# Patient Record
Sex: FEMALE | Race: WHITE | NOT HISPANIC OR LATINO | Employment: OTHER | URBAN - METROPOLITAN AREA
[De-identification: names, ages, dates, MRNs, and addresses within clinical notes are randomized per-mention and may not be internally consistent; named-entity substitution may affect disease eponyms.]

---

## 2023-03-16 ENCOUNTER — TELEPHONE (OUTPATIENT)
Dept: PRIMARY CARE | Facility: CLINIC | Age: 88
End: 2023-03-16

## 2023-03-16 DIAGNOSIS — I10 PRIMARY HYPERTENSION: Primary | ICD-10-CM

## 2023-03-16 RX ORDER — AMLODIPINE BESYLATE 5 MG/1
5 TABLET ORAL DAILY
Qty: 90 TABLET | Refills: 0 | Status: SHIPPED | OUTPATIENT
Start: 2023-03-16 | End: 2023-05-04

## 2023-03-16 RX ORDER — AMLODIPINE BESYLATE 5 MG/1
TABLET ORAL
COMMUNITY
End: 2023-03-16 | Stop reason: SDUPTHER

## 2023-03-27 PROBLEM — E78.00 HYPERCHOLESTEROLEMIA: Status: ACTIVE | Noted: 2023-03-27

## 2023-03-27 PROBLEM — G45.9 TIA (TRANSIENT ISCHEMIC ATTACK): Status: ACTIVE | Noted: 2023-03-27

## 2023-03-27 PROBLEM — I35.1 NONRHEUMATIC AORTIC VALVE INSUFFICIENCY: Status: ACTIVE | Noted: 2023-03-27

## 2023-03-27 PROBLEM — M85.80 OSTEOPENIA: Status: ACTIVE | Noted: 2023-03-27

## 2023-03-27 PROBLEM — I10 HYPERTENSION: Status: ACTIVE | Noted: 2023-03-27

## 2023-03-27 PROBLEM — R32 URINARY INCONTINENCE: Status: ACTIVE | Noted: 2023-03-27

## 2023-03-27 PROBLEM — I51.89 LEFT VENTRICULAR DIASTOLIC DYSFUNCTION, NYHA CLASS 1: Status: ACTIVE | Noted: 2023-03-27

## 2023-03-27 PROBLEM — K21.9 ESOPHAGEAL REFLUX: Status: ACTIVE | Noted: 2023-03-27

## 2023-03-27 PROBLEM — J30.9 ALLERGIC RHINITIS: Status: ACTIVE | Noted: 2023-03-27

## 2023-03-27 PROBLEM — F41.9 ANXIETY: Status: ACTIVE | Noted: 2023-03-27

## 2023-03-27 PROBLEM — I20.89 ANGINA AT REST (CMS-HCC): Status: ACTIVE | Noted: 2023-03-27

## 2023-03-27 PROBLEM — Z96.0 VAGINAL PESSARY PRESENT: Status: ACTIVE | Noted: 2023-03-27

## 2023-03-27 PROBLEM — M19.90 ARTHRITIS: Status: ACTIVE | Noted: 2023-03-27

## 2023-03-27 PROBLEM — I47.19 ATRIAL TACHYCARDIA (CMS-HCC): Status: ACTIVE | Noted: 2023-03-27

## 2023-03-27 PROBLEM — E55.9 VITAMIN D DEFICIENCY: Status: ACTIVE | Noted: 2023-03-27

## 2023-03-27 PROBLEM — R01.1 CARDIAC MURMUR: Status: ACTIVE | Noted: 2023-03-27

## 2023-03-27 RX ORDER — FUROSEMIDE 20 MG/1
0.5 TABLET ORAL DAILY
COMMUNITY
Start: 2021-06-07 | End: 2023-03-28 | Stop reason: SDUPTHER

## 2023-03-27 RX ORDER — KETOCONAZOLE 20 MG/ML
SHAMPOO, SUSPENSION TOPICAL
COMMUNITY
Start: 2022-09-08

## 2023-03-27 RX ORDER — CALCIUM CARBONATE 600 MG
1 TABLET ORAL 3 TIMES DAILY
COMMUNITY

## 2023-03-27 RX ORDER — BENAZEPRIL HYDROCHLORIDE 40 MG/1
1 TABLET ORAL 2 TIMES DAILY
COMMUNITY
End: 2023-07-20

## 2023-03-27 RX ORDER — FAMOTIDINE 20 MG/1
1 TABLET, FILM COATED ORAL 2 TIMES DAILY
COMMUNITY

## 2023-03-27 RX ORDER — PSYLLIUM SEED (WITH DEXTROSE)
POWDER (GRAM) ORAL
COMMUNITY
End: 2023-05-18 | Stop reason: SDUPTHER

## 2023-03-27 RX ORDER — OXYQUINOLINE SULFATE AND SODIUM LAURYL SULFATE .25; .1 MG/G; MG/G
JELLY VAGINAL
COMMUNITY
Start: 2021-10-12 | End: 2023-05-18 | Stop reason: SDUPTHER

## 2023-03-27 RX ORDER — NEBIVOLOL 10 MG/1
1 TABLET ORAL DAILY
COMMUNITY
Start: 2021-04-12 | End: 2024-02-13 | Stop reason: SDUPTHER

## 2023-03-27 RX ORDER — FLUTICASONE PROPIONATE 50 MCG
1 SPRAY, SUSPENSION (ML) NASAL DAILY
COMMUNITY
Start: 2021-02-11 | End: 2023-07-17 | Stop reason: SDUPTHER

## 2023-03-27 RX ORDER — CLOBETASOL PROPIONATE 0.5 MG/G
CREAM TOPICAL
COMMUNITY

## 2023-03-27 RX ORDER — ATORVASTATIN CALCIUM 10 MG/1
0.5 TABLET, FILM COATED ORAL DAILY
COMMUNITY
Start: 2012-10-09 | End: 2023-10-17 | Stop reason: SDUPTHER

## 2023-03-27 RX ORDER — POTASSIUM CHLORIDE 20 MEQ/1
TABLET, EXTENDED RELEASE ORAL
COMMUNITY
Start: 2021-04-02 | End: 2023-12-01

## 2023-03-27 RX ORDER — VIT C/E/ZN/COPPR/LUTEIN/ZEAXAN 250MG-90MG
CAPSULE ORAL
COMMUNITY

## 2023-03-27 RX ORDER — ALPRAZOLAM 0.25 MG/1
0.25 TABLET ORAL DAILY PRN
COMMUNITY
Start: 2020-05-13 | End: 2023-05-02 | Stop reason: SDUPTHER

## 2023-03-27 RX ORDER — MOMETASONE FUROATE 1 MG/G
CREAM TOPICAL
COMMUNITY
Start: 2022-02-02

## 2023-03-27 RX ORDER — NITROGLYCERIN 0.4 MG/1
0.4 TABLET SUBLINGUAL EVERY 5 MIN PRN
COMMUNITY
Start: 2017-02-11

## 2023-03-27 RX ORDER — ACETAMINOPHEN 325 MG/1
TABLET ORAL
COMMUNITY

## 2023-03-28 ENCOUNTER — OFFICE VISIT (OUTPATIENT)
Dept: PRIMARY CARE | Facility: CLINIC | Age: 88
End: 2023-03-28
Payer: MEDICARE

## 2023-03-28 VITALS
BODY MASS INDEX: 22.41 KG/M2 | OXYGEN SATURATION: 96 % | WEIGHT: 118.7 LBS | HEIGHT: 61 IN | DIASTOLIC BLOOD PRESSURE: 58 MMHG | SYSTOLIC BLOOD PRESSURE: 126 MMHG | HEART RATE: 76 BPM

## 2023-03-28 DIAGNOSIS — J30.2 SEASONAL ALLERGIC RHINITIS, UNSPECIFIED TRIGGER: Primary | ICD-10-CM

## 2023-03-28 DIAGNOSIS — I35.1 NONRHEUMATIC AORTIC VALVE INSUFFICIENCY: ICD-10-CM

## 2023-03-28 DIAGNOSIS — K59.09 CONSTIPATION, CHRONIC: ICD-10-CM

## 2023-03-28 DIAGNOSIS — I10 PRIMARY HYPERTENSION: ICD-10-CM

## 2023-03-28 DIAGNOSIS — J30.9 ALLERGIC RHINITIS, UNSPECIFIED SEASONALITY, UNSPECIFIED TRIGGER: ICD-10-CM

## 2023-03-28 DIAGNOSIS — I10 ESSENTIAL HYPERTENSION: ICD-10-CM

## 2023-03-28 PROBLEM — R07.9 CHEST PAIN: Status: ACTIVE | Noted: 2017-02-10

## 2023-03-28 PROBLEM — J31.0 NASAL INFLAMMATION: Status: ACTIVE | Noted: 2023-03-28

## 2023-03-28 PROCEDURE — 3078F DIAST BP <80 MM HG: CPT | Performed by: INTERNAL MEDICINE

## 2023-03-28 PROCEDURE — 99214 OFFICE O/P EST MOD 30 MIN: CPT | Performed by: INTERNAL MEDICINE

## 2023-03-28 PROCEDURE — 1160F RVW MEDS BY RX/DR IN RCRD: CPT | Performed by: INTERNAL MEDICINE

## 2023-03-28 PROCEDURE — 3074F SYST BP LT 130 MM HG: CPT | Performed by: INTERNAL MEDICINE

## 2023-03-28 PROCEDURE — 1159F MED LIST DOCD IN RCRD: CPT | Performed by: INTERNAL MEDICINE

## 2023-03-28 PROCEDURE — 1036F TOBACCO NON-USER: CPT | Performed by: INTERNAL MEDICINE

## 2023-03-28 RX ORDER — FUROSEMIDE 20 MG/1
10 TABLET ORAL DAILY
Qty: 90 TABLET | Refills: 3 | Status: SHIPPED | OUTPATIENT
Start: 2023-03-28 | End: 2024-04-17

## 2023-03-28 RX ORDER — LORATADINE 10 MG/1
10 TABLET ORAL DAILY
Qty: 30 TABLET | Refills: 2 | Status: SHIPPED | OUTPATIENT
Start: 2023-03-28 | End: 2024-02-16 | Stop reason: WASHOUT

## 2023-03-28 ASSESSMENT — ENCOUNTER SYMPTOMS
CONSTIPATION: 1
DIZZINESS: 0
VOMITING: 0
FATIGUE: 0
DYSURIA: 0
SORE THROAT: 0
HEADACHES: 0
CHILLS: 0
WEAKNESS: 0
DIARRHEA: 0
BACK PAIN: 1
FREQUENCY: 0
FEVER: 0
COUGH: 0
EYE DISCHARGE: 1
RHINORRHEA: 1
EYE ITCHING: 1
PALPITATIONS: 0
DIFFICULTY URINATING: 0
SHORTNESS OF BREATH: 0
HEMATURIA: 0
LIGHT-HEADEDNESS: 0
MYALGIAS: 0
ARTHRALGIAS: 0
NAUSEA: 0

## 2023-03-28 ASSESSMENT — PATIENT HEALTH QUESTIONNAIRE - PHQ9
SUM OF ALL RESPONSES TO PHQ9 QUESTIONS 1 AND 2: 0
1. LITTLE INTEREST OR PLEASURE IN DOING THINGS: NOT AT ALL
2. FEELING DOWN, DEPRESSED OR HOPELESS: NOT AT ALL

## 2023-03-28 ASSESSMENT — PAIN SCALES - GENERAL: PAINLEVEL: 6

## 2023-03-28 NOTE — ASSESSMENT & PLAN NOTE
She has watery eyes runny nose cough and postnasal drip.  She says the Flonase makes her feel little hyper anxious so I recommended trying Claritin 10 mg daily for at least 2 weeks to see if it helps her symptoms.  If symptoms or not improving we would possibly consider an referral to an allergist.

## 2023-03-28 NOTE — ASSESSMENT & PLAN NOTE
Patient was instructed to take 1 dose of Metamucil every day and if she gets constipated does not have a bowel movement then she can take a half a dose of MiraLAX.  While taking a full dose of MiraLAX she ended up having diarrhea so I think it is too much for her.  I explained that she could take both medications together however

## 2023-03-28 NOTE — PATIENT INSTRUCTIONS
Use flonase 2 puffs each nostril once a day    Try clariten or loratedine 10 mg a day OTC    Take metamucil daily and miralax as need for constipation    Keep June appt

## 2023-03-28 NOTE — PROGRESS NOTES
Subjective   Patient ID: Gris Salcido is a 89 y.o. female who presents for allergies and med refill.  BN    Patient made the appointment today to discuss several issues including her eyes and runny runny eyes and runny nose.  She is also started coughing recently and thinks she may have allergies.  She also complains of worsening constipation she tried taking MiraLAX daily but it being caused her to have diarrhea or runny stools if she takes it on a daily basis and she was not sure if she should be taking it along with Metamucil or instead of.          Review of Systems   Constitutional:  Negative for chills, fatigue and fever.   HENT:  Positive for congestion, hearing loss, postnasal drip and rhinorrhea. Negative for sore throat.    Eyes:  Positive for discharge and itching. Negative for visual disturbance.   Respiratory:  Negative for cough and shortness of breath.    Cardiovascular:  Negative for chest pain, palpitations and leg swelling.   Gastrointestinal:  Positive for constipation. Negative for diarrhea, nausea and vomiting.   Genitourinary:  Negative for difficulty urinating, dysuria, frequency, hematuria and urgency.   Musculoskeletal:  Positive for back pain. Negative for arthralgias and myalgias.   Skin:  Negative for rash.   Neurological:  Negative for dizziness, syncope, weakness, light-headedness and headaches.       Objective   Physical Exam  Constitutional:       Appearance: Normal appearance.   HENT:      Head: Normocephalic and atraumatic.      Nose: Rhinorrhea present.   Eyes:      General:         Right eye: Discharge present.         Left eye: Discharge present.     Extraocular Movements: Extraocular movements intact.      Pupils: Pupils are equal, round, and reactive to light.   Cardiovascular:      Rate and Rhythm: Normal rate and regular rhythm.      Heart sounds: Murmur heard.   Pulmonary:      Breath sounds: Normal breath sounds.   Abdominal:      General: Abdomen is flat. Bowel sounds are  "normal.      Palpations: Abdomen is soft.   Musculoskeletal:      Right lower leg: No edema.      Left lower leg: No edema.   Neurological:      Mental Status: She is alert.     /58   Pulse 76   Ht 1.549 m (5' 1\")   Wt 53.8 kg (118 lb 11.2 oz)   SpO2 96%   BMI 22.43 kg/m²       Assessment/Plan   Problem List Items Addressed This Visit          Circulatory    Hypertension     Stable         Aortic regurgitation     Patient still has a mild 1 out of 6 to 2 out of 6 diastolic murmur            Digestive    Constipation, chronic     Patient was instructed to take 1 dose of Metamucil every day and if she gets constipated does not have a bowel movement then she can take a half a dose of MiraLAX.  While taking a full dose of MiraLAX she ended up having diarrhea so I think it is too much for her.  I explained that she could take both medications together however            Infectious/Inflammatory    Nasal inflammation - Primary    Relevant Medications    loratadine (Claritin) 10 mg tablet       Other    Allergic rhinitis     She has watery eyes runny nose cough and postnasal drip.  She says the Flonase makes her feel little hyper anxious so I recommended trying Claritin 10 mg daily for at least 2 weeks to see if it helps her symptoms.  If symptoms or not improving we would possibly consider an referral to an allergist.          Other Visit Diagnoses       Essential hypertension        Relevant Medications    furosemide (Lasix) 20 mg tablet                   It has been a pleasure seeing you.  "

## 2023-04-06 ENCOUNTER — TELEPHONE (OUTPATIENT)
Dept: PRIMARY CARE | Facility: CLINIC | Age: 88
End: 2023-04-06
Payer: MEDICARE

## 2023-04-06 NOTE — TELEPHONE ENCOUNTER
Patient cut back her amlodipine and doesn't now if it is a coincident but she started to have paplitations.   Patient wants to know if she can take 1/2 a pill of amlodipine in the morning and 1 at night of amlodipine.   Please evaluate and advise.

## 2023-04-06 NOTE — TELEPHONE ENCOUNTER
Patient states she was having palpitations last night for 1 1/2 hours and she also states she is not having it now. Patient want to know if she can go back to taking her amlodipine dose of 1/1 a pill in the morning and 1 pill at night instead of taking 1 pill a day.  BN

## 2023-05-01 ENCOUNTER — TELEPHONE (OUTPATIENT)
Dept: PRIMARY CARE | Facility: CLINIC | Age: 88
End: 2023-05-01
Payer: MEDICARE

## 2023-05-01 DIAGNOSIS — F41.9 ANXIETY: ICD-10-CM

## 2023-05-01 RX ORDER — ALPRAZOLAM 0.25 MG/1
0.25 TABLET ORAL DAILY PRN
Qty: 30 TABLET | Refills: 0 | Status: CANCELLED | OUTPATIENT
Start: 2023-05-01

## 2023-05-02 DIAGNOSIS — F41.1 ANXIETY STATE: Primary | ICD-10-CM

## 2023-05-02 RX ORDER — ALPRAZOLAM 0.25 MG/1
0.25 TABLET ORAL DAILY PRN
Qty: 30 TABLET | Refills: 0 | Status: SHIPPED | OUTPATIENT
Start: 2023-05-02 | End: 2023-10-17 | Stop reason: SDUPTHER

## 2023-05-02 NOTE — TELEPHONE ENCOUNTER
Patient rarely uses this medication and last prescription was sent November 2021.  Due to the scarcity of the usage a controlled substance agreement is not necessary and number 30 pills was sent with no refills.

## 2023-05-15 ENCOUNTER — TELEPHONE (OUTPATIENT)
Dept: PRIMARY CARE | Facility: CLINIC | Age: 88
End: 2023-05-15

## 2023-05-18 PROBLEM — E78.00 HYPERCHOLESTEROLEMIA: Status: ACTIVE | Noted: 2023-05-18

## 2023-05-18 PROBLEM — R00.2 PALPITATIONS: Status: ACTIVE | Noted: 2023-05-18

## 2023-05-18 PROBLEM — R32 URINARY INCONTINENCE: Status: ACTIVE | Noted: 2023-05-18

## 2023-05-18 RX ORDER — OXYQUINOLINE SULFATE AND SODIUM LAURYL SULFATE .25; .1 MG/G; MG/G
JELLY VAGINAL
COMMUNITY
Start: 2021-10-12

## 2023-05-18 RX ORDER — BACITRACIN ZINC AND POLYMYXIN B SULFATE 500; 10000 [USP'U]/G; [USP'U]/G
1 OINTMENT OPHTHALMIC 4 TIMES DAILY
COMMUNITY
Start: 2023-05-13

## 2023-05-18 RX ORDER — PSYLLIUM SEED (WITH DEXTROSE)
POWDER (GRAM) ORAL
COMMUNITY

## 2023-05-19 ENCOUNTER — OFFICE VISIT (OUTPATIENT)
Dept: PRIMARY CARE | Facility: CLINIC | Age: 88
End: 2023-05-19
Payer: MEDICARE

## 2023-05-19 VITALS
WEIGHT: 117.8 LBS | HEART RATE: 67 BPM | HEIGHT: 61 IN | SYSTOLIC BLOOD PRESSURE: 152 MMHG | BODY MASS INDEX: 22.24 KG/M2 | DIASTOLIC BLOOD PRESSURE: 59 MMHG | OXYGEN SATURATION: 93 %
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
## 2023-05-19 DIAGNOSIS — S00.83XD FACIAL CONTUSION, SUBSEQUENT ENCOUNTER: ICD-10-CM

## 2023-05-19 DIAGNOSIS — S01.81XD FACE LACERATIONS, SUBSEQUENT ENCOUNTER: Primary | ICD-10-CM

## 2023-05-19 DIAGNOSIS — Z48.02 VISIT FOR SUTURE REMOVAL: ICD-10-CM

## 2023-05-19 PROCEDURE — 99214 OFFICE O/P EST MOD 30 MIN: CPT | Performed by: INTERNAL MEDICINE

## 2023-05-19 PROCEDURE — 1160F RVW MEDS BY RX/DR IN RCRD: CPT | Performed by: INTERNAL MEDICINE

## 2023-05-19 PROCEDURE — 1159F MED LIST DOCD IN RCRD: CPT | Performed by: INTERNAL MEDICINE

## 2023-05-19 PROCEDURE — 3077F SYST BP >= 140 MM HG: CPT | Performed by: INTERNAL MEDICINE

## 2023-05-19 PROCEDURE — 3078F DIAST BP <80 MM HG: CPT | Performed by: INTERNAL MEDICINE

## 2023-05-19 PROCEDURE — 1036F TOBACCO NON-USER: CPT | Performed by: INTERNAL MEDICINE

## 2023-05-19 ASSESSMENT — ENCOUNTER SYMPTOMS
HEADACHES: 0
FREQUENCY: 0
FEVER: 0
HEMATURIA: 0
LIGHT-HEADEDNESS: 0
VOMITING: 0
COUGH: 0
FATIGUE: 0
SHORTNESS OF BREATH: 0
PALPITATIONS: 0
ARTHRALGIAS: 0
RHINORRHEA: 0
DIZZINESS: 0
WEAKNESS: 0
MYALGIAS: 0
CONSTIPATION: 0
NAUSEA: 0
DYSURIA: 0
DIFFICULTY URINATING: 0
DIARRHEA: 0
FACIAL SWELLING: 1
SORE THROAT: 0
CHILLS: 0

## 2023-05-19 ASSESSMENT — PATIENT HEALTH QUESTIONNAIRE - PHQ9: 1. LITTLE INTEREST OR PLEASURE IN DOING THINGS: NOT AT ALL

## 2023-05-19 ASSESSMENT — PAIN SCALES - GENERAL: PAINLEVEL: 4

## 2023-05-19 NOTE — PROGRESS NOTES
Subjective   Patient ID: Gris Salcido is a 89 y.o. female who presents for removal of stitches of the left eyebrow.  BN    Patient is here for suture removal after a fall.  On last Friday which was May 12 the patient was in her bathroom tripped over a small rug and fell into the opening of the shower where the transmit of the shower is.  She did hit her nose and face got a laceration over her left eyebrow and broke her nose.  She says extensive bruising.  She was taken to the emergency room where they diagnosed her with a nasal fracture and she does have an upcoming appointment with an ENT to have this evaluated and determine if anything needs to be done.    Patient had sutures subcutaneous dissolving under the left cheek as well as over the left eyebrow and 5 black sutures in the left eyebrow on the surface as well.  Because of the extent of the damage the patient was placed on Bactrim which she has completed a whole week but is complaining of significant nausea on the medication.  Since she has no evidence of infection and it is healing nicely I told her she can stop the Bactrim.     Patient does remember the fall clearly and did not have a syncopal episode.        Review of Systems   Constitutional:  Negative for chills, fatigue and fever.   HENT:  Positive for facial swelling. Negative for dental problem, drooling, ear pain, hearing loss, mouth sores, nosebleeds, postnasal drip, rhinorrhea and sore throat.         Left eye is draining which she is using antibiotic drops.   Eyes:  Negative for visual disturbance.   Respiratory:  Negative for cough and shortness of breath.    Cardiovascular:  Negative for chest pain, palpitations and leg swelling.   Gastrointestinal:  Negative for constipation, diarrhea, nausea and vomiting.   Genitourinary:  Negative for difficulty urinating, dysuria, frequency, hematuria and urgency.   Musculoskeletal:  Negative for arthralgias and myalgias.   Skin:  Negative for rash.    Neurological:  Negative for dizziness, syncope, weakness, light-headedness and headaches.       Objective   Medication Documentation Review Audit       Reviewed by Noa Coelho MD (Physician) on 05/19/23 at 0908      Medication Order Taking? Sig Documenting Provider Last Dose Status   acetaminophen (TylenoL) 325 mg tablet [id number]  Tylenol 325 MG Oral Tablet   Refills: 0       Active Estela Mejia MD  Active   ALPRAZolam (Xanax) 0.25 mg tablet [id number]  Take 1 tablet (0.25 mg) by mouth once daily as needed for anxiety (once daily as needed for anxiety). Noa Coelho MD  Active   amLODIPine (Norvasc) 5 mg tablet [id number]  TAKE 1 TABLET BY MOUTH ONCE  DAILY Noa Coelho MD  Active   atorvastatin (Lipitor) 10 mg tablet [id number] No Take 0.5 tablets (5 mg) by mouth once daily. Estela Mejia MD Taking Active   bacitracin-polymyxin B (Polysporin) ophthalmic ointment [id number]  1 Application 4 times a day. Estela Mejia MD  Active   benazepril (Lotensin) 40 mg tablet [id number] No Take 1 tablet (40 mg) by mouth in the morning and 1 tablet (40 mg) before bedtime. Estela Mejia MD Taking Active   calcium carbonate 600 mg calcium (1,500 mg) tablet [id number] No Take 1 tablet (600 mg) by mouth in the morning and 1 tablet (600 mg) in the evening and 1 tablet (600 mg) before bedtime. Estela Mejia MD Taking Active   cholecalciferol (Vitamin D-3) 25 MCG (1000 UT) capsule [id number]  TAKE 1 CAPSULE M, W,Fri, Sat Estela Mejia MD  Active   clobetasol (Temovate) 0.05 % cream [id number]  Apply cream topically to affected areas on the trunk & extremities twice daily until clear, then as needed for flares cover with thick moisturizer, avoid groin, face Estela Mejia MD  Active   famotidine (Pepcid) 20 mg tablet [id number] No Take 1 tablet (20 mg) by mouth in the morning and 1 tablet (20 mg) before bedtime. Estela Mejia MD Taking Active   fluticasone (Flonase) 50 mcg/actuation  nasal spray [id number]  Administer 1 spray into each nostril once daily. Historical Provider, MD  Active   furosemide (Lasix) 20 mg tablet [id number]  Take 0.5 tablets (10 mg) by mouth once daily. Noa Coelho MD  Active   ketoconazole (NIZOral) 2 % shampoo [id number]  WASH SCALP DAILY UNTIL CLEAR. THEN USE 2-3 TIMES A WEEK FOR MAINTENANCE, LET SIT FOR 3-5 MINUTES THEN RINSE Estela Mejia MD  Active   loratadine (Claritin) 10 mg tablet [id number]  Take 1 tablet (10 mg) by mouth once daily. Noa Coelho MD  Active   mometasone (Elocon) 0.1 % cream [id number]  APPLY CREAM TOPICALLY TWICE DAILY TO AFFECTED AREA ON LEG UNTIL RESOLVED, TAPER AS DIRECTED Estela Mejia MD  Active   nebivolol (Bystolic) 10 mg tablet [id number] No Take 1 tablet (10 mg) by mouth once daily. Estela Mejia MD Taking Active   nitroglycerin (Nitrostat) 0.4 mg SL tablet [id number]  Place 1 tablet (0.4 mg) under the tongue every 5 minutes if needed for chest pain. For up to 3 doses. Call 911 if pain persists. Historical Provider, MD  Active     Discontinued 05/18/23 1746   oxyquinoline-sod.lauryl sulfat (Trimo-Stovall Jelly) 0.025-0.01 % gel [id number]  Insert into the vagina. Historical Provider, MD  Active   potassium chloride CR (Klor-Con M20) 20 mEq ER tablet [id number]  Take a tab Monday-Wednesday and Friday Historical MD Jackie  Active   psyllium (Metamucil) powder [id number]  Take by mouth once daily. Historical Provider, MD  Active     Discontinued 05/18/23 1745   psyllium husk, with sugar, (Metamucil, with sugar,) 3.4 gram/12 gram powder [id number]  Take by mouth. Historical Provider, MD  Active                  Physical Exam  Constitutional:       Appearance: Normal appearance.   HENT:      Head: Normocephalic.      Comments: Patient has swelling over the left cheek and a palpable hematoma the size of half of an egg in her left cheek.  The entire left side of her face is shades of yellow purple-red and blue extending down her neck to  "the clavicle on the left side.  She also has some more restricted purple bruising on the right side of her cheek but does not stops at about her lip level.  Left eyebrow has a proximately a 2 inch incision that starts near the nasal bridge and goes up and laterally and then across the top of her eyebrow on the left side.  There are 5 black sutures in place     Nose: Nose normal.   Eyes:      Extraocular Movements: Extraocular movements intact.      Pupils: Pupils are equal, round, and reactive to light.   Cardiovascular:      Rate and Rhythm: Normal rate and regular rhythm.   Pulmonary:      Breath sounds: Normal breath sounds.   Abdominal:      General: Abdomen is flat. Bowel sounds are normal.      Palpations: Abdomen is soft.   Musculoskeletal:      Right lower leg: No edema.      Left lower leg: No edema.   Neurological:      Mental Status: She is alert.     /59 (BP Location: Left arm, Patient Position: Sitting)   Pulse 67   Ht 1.549 m (5' 1\")   Wt 53.4 kg (117 lb 12.8 oz)   SpO2 93%   BMI 22.26 kg/m²           Assessment/Plan   Problem List Items Addressed This Visit          Other    Face lacerations, subsequent encounter - Primary    Visit for suture removal     Patient was placed in the supine position.  Alcohol swab was used to clean the laceration over the left eyebrow.  Using suture remover scissors and tweezers the 5 sutures were easily removed.  Benzoin and 2 Steri-Strips were applied to the part of the laceration most medial or closest to the nose as it appeared slightly open but was not gapping.  There is no erythema or drainage around any of the wounds and no evidence of infection.         Facial contusion, subsequent encounter    Fall due to tripping on loose carpet     Patient was reminded to follow-up with ENT for her facial or nose fracture  She will keep her appointment with me in June to follow-up on this as well as other medical problems.         It has been a pleasure seeing " you.

## 2023-05-19 NOTE — ASSESSMENT & PLAN NOTE
Patient was placed in the supine position.  Alcohol swab was used to clean the laceration over the left eyebrow.  Using suture remover scissors and tweezers the 5 sutures were easily removed.  Benzoin and 2 Steri-Strips were applied to the part of the laceration most medial or closest to the nose as it appeared slightly open but was not gapping.  There is no erythema or drainage around any of the wounds and no evidence of infection.

## 2023-05-22 ENCOUNTER — APPOINTMENT (OUTPATIENT)
Dept: PRIMARY CARE | Facility: CLINIC | Age: 88
End: 2023-05-22
Payer: MEDICARE

## 2023-06-01 LAB
ALANINE AMINOTRANSFERASE (SGPT) (U/L) IN SER/PLAS: 9 U/L (ref 7–45)
ALBUMIN (G/DL) IN SER/PLAS: 4.3 G/DL (ref 3.4–5)
ALKALINE PHOSPHATASE (U/L) IN SER/PLAS: 70 U/L (ref 33–136)
ANION GAP IN SER/PLAS: 13 MMOL/L (ref 10–20)
ASPARTATE AMINOTRANSFERASE (SGOT) (U/L) IN SER/PLAS: 18 U/L (ref 9–39)
BILIRUBIN TOTAL (MG/DL) IN SER/PLAS: 0.7 MG/DL (ref 0–1.2)
CALCIDIOL (25 OH VITAMIN D3) (NG/ML) IN SER/PLAS: 48 NG/ML
CALCIUM (MG/DL) IN SER/PLAS: 10 MG/DL (ref 8.6–10.6)
CARBON DIOXIDE, TOTAL (MMOL/L) IN SER/PLAS: 28 MMOL/L (ref 21–32)
CHLORIDE (MMOL/L) IN SER/PLAS: 99 MMOL/L (ref 98–107)
CHOLESTEROL (MG/DL) IN SER/PLAS: 131 MG/DL (ref 0–199)
CHOLESTEROL IN HDL (MG/DL) IN SER/PLAS: 56.1 MG/DL
CHOLESTEROL/HDL RATIO: 2.3
CREATININE (MG/DL) IN SER/PLAS: 0.89 MG/DL (ref 0.5–1.05)
ERYTHROCYTE DISTRIBUTION WIDTH (RATIO) BY AUTOMATED COUNT: 14.1 % (ref 11.5–14.5)
ERYTHROCYTE MEAN CORPUSCULAR HEMOGLOBIN CONCENTRATION (G/DL) BY AUTOMATED: 31.9 G/DL (ref 32–36)
ERYTHROCYTE MEAN CORPUSCULAR VOLUME (FL) BY AUTOMATED COUNT: 97 FL (ref 80–100)
ERYTHROCYTES (10*6/UL) IN BLOOD BY AUTOMATED COUNT: 3.65 X10E12/L (ref 4–5.2)
GFR FEMALE: 62 ML/MIN/1.73M2
GLUCOSE (MG/DL) IN SER/PLAS: 78 MG/DL (ref 74–99)
HEMATOCRIT (%) IN BLOOD BY AUTOMATED COUNT: 35.4 % (ref 36–46)
HEMOGLOBIN (G/DL) IN BLOOD: 11.3 G/DL (ref 12–16)
LDL: 61 MG/DL (ref 0–99)
LEUKOCYTES (10*3/UL) IN BLOOD BY AUTOMATED COUNT: 6.6 X10E9/L (ref 4.4–11.3)
NRBC (PER 100 WBCS) BY AUTOMATED COUNT: 0 /100 WBC (ref 0–0)
PLATELETS (10*3/UL) IN BLOOD AUTOMATED COUNT: 221 X10E9/L (ref 150–450)
POTASSIUM (MMOL/L) IN SER/PLAS: 4.1 MMOL/L (ref 3.5–5.3)
PROTEIN TOTAL: 7 G/DL (ref 6.4–8.2)
SODIUM (MMOL/L) IN SER/PLAS: 136 MMOL/L (ref 136–145)
THYROTROPIN (MIU/L) IN SER/PLAS BY DETECTION LIMIT <= 0.05 MIU/L: 3.47 MIU/L (ref 0.44–3.98)
TRIGLYCERIDE (MG/DL) IN SER/PLAS: 69 MG/DL (ref 0–149)
UREA NITROGEN (MG/DL) IN SER/PLAS: 14 MG/DL (ref 6–23)
VLDL: 14 MG/DL (ref 0–40)

## 2023-06-07 ENCOUNTER — OFFICE VISIT (OUTPATIENT)
Dept: PRIMARY CARE | Facility: CLINIC | Age: 88
End: 2023-06-07
Payer: MEDICARE

## 2023-06-07 VITALS — DIASTOLIC BLOOD PRESSURE: 60 MMHG | SYSTOLIC BLOOD PRESSURE: 130 MMHG

## 2023-06-07 DIAGNOSIS — I10 PRIMARY HYPERTENSION: ICD-10-CM

## 2023-06-07 DIAGNOSIS — D64.9 ANEMIA, UNSPECIFIED TYPE: Primary | ICD-10-CM

## 2023-06-07 DIAGNOSIS — I47.19 ATRIAL TACHYCARDIA (CMS-HCC): ICD-10-CM

## 2023-06-07 DIAGNOSIS — S00.83XD FACIAL CONTUSION, SUBSEQUENT ENCOUNTER: ICD-10-CM

## 2023-06-07 DIAGNOSIS — S01.81XD FACE LACERATIONS, SUBSEQUENT ENCOUNTER: ICD-10-CM
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
## 2023-06-07 PROBLEM — Z48.02 VISIT FOR SUTURE REMOVAL: Status: RESOLVED | Noted: 2023-05-19 | Resolved: 2023-06-07

## 2023-06-07 PROBLEM — R07.9 CHEST PAIN: Status: RESOLVED | Noted: 2017-02-10 | Resolved: 2023-06-07

## 2023-06-07 PROCEDURE — 1159F MED LIST DOCD IN RCRD: CPT | Performed by: INTERNAL MEDICINE

## 2023-06-07 PROCEDURE — 3078F DIAST BP <80 MM HG: CPT | Performed by: INTERNAL MEDICINE

## 2023-06-07 PROCEDURE — 99214 OFFICE O/P EST MOD 30 MIN: CPT | Performed by: INTERNAL MEDICINE

## 2023-06-07 PROCEDURE — 1160F RVW MEDS BY RX/DR IN RCRD: CPT | Performed by: INTERNAL MEDICINE

## 2023-06-07 PROCEDURE — 3075F SYST BP GE 130 - 139MM HG: CPT | Performed by: INTERNAL MEDICINE

## 2023-06-07 PROCEDURE — 1036F TOBACCO NON-USER: CPT | Performed by: INTERNAL MEDICINE

## 2023-06-07 ASSESSMENT — ENCOUNTER SYMPTOMS
DIZZINESS: 0
DIARRHEA: 0
SHORTNESS OF BREATH: 0
MYALGIAS: 0
PALPITATIONS: 0
HEADACHES: 0
LIGHT-HEADEDNESS: 0
COUGH: 0
NAUSEA: 0
HEMATURIA: 0
CONSTIPATION: 0
ARTHRALGIAS: 0
FEVER: 0
DIFFICULTY URINATING: 0
FREQUENCY: 0
DYSURIA: 0
SORE THROAT: 0
WEAKNESS: 0
VOMITING: 0
FATIGUE: 0
CHILLS: 0

## 2023-06-07 NOTE — PATIENT INSTRUCTIONS
Please get a CBC or blood work at the end of the month    Follow up Dr Coelho in 4 month  30 min appointment

## 2023-06-07 NOTE — PROGRESS NOTES
Subjective   Patient ID: Gris Salcido is a 89 y.o. female who presents for No chief complaint on file..  Patient is here for follow-up for her hypertension cholesterol and medication management.  Complete blood work was finished June 1 and was reviewed with the patient today.  The patient is accompanied by her niece today Makenna.  Patient still has bruising under her right eye and cheek from a fall she had recently.    The most interesting data in her complete blood work is that she is anemic.  I suspect this anemia is from her recent fall when she had a bloody nose blood loss and extensive bruising in her face and head.  Rather than do a full evaluation and work-up I will repeat her CBC at the end of this month to make sure it is stable or rising as we already have a probable explanation for her anemia.        Review of Systems   Constitutional:  Negative for chills, fatigue and fever.   HENT:  Negative for sore throat.    Eyes:  Negative for visual disturbance.   Respiratory:  Negative for cough and shortness of breath.    Cardiovascular:  Negative for chest pain, palpitations and leg swelling.   Gastrointestinal:  Negative for constipation, diarrhea, nausea and vomiting.   Genitourinary:  Negative for difficulty urinating, dysuria, frequency, hematuria and urgency.   Musculoskeletal:  Positive for gait problem. Negative for arthralgias and myalgias.   Skin:  Negative for rash.   Neurological:  Negative for dizziness, syncope, weakness, light-headedness and headaches.       Objective   Medication Documentation Review Audit       Reviewed by Noa Coelho MD (Physician) on 06/07/23 at 1336      Medication Order Taking? Sig Documenting Provider Last Dose Status   acetaminophen (TylenoL) 325 mg tablet [id number]  Tylenol 325 MG Oral Tablet   Refills: 0       Active Historical Provider, MD  Active   ALPRAZolam (Xanax) 0.25 mg tablet [id number]  Take 1 tablet (0.25 mg) by mouth once daily as needed for anxiety (once daily  as needed for anxiety). Noa Coelho MD  Active   amLODIPine (Norvasc) 5 mg tablet [id number]  TAKE 1 TABLET BY MOUTH ONCE  DAILY Noa Coelho MD  Active   atorvastatin (Lipitor) 10 mg tablet [id number] No Take 0.5 tablets (5 mg) by mouth once daily. Estela Mejia MD Taking Active   bacitracin-polymyxin B (Polysporin) ophthalmic ointment [id number]  1 Application 4 times a day. Estela Mejia MD  Active   benazepril (Lotensin) 40 mg tablet [id number] No Take 1 tablet (40 mg) by mouth in the morning and 1 tablet (40 mg) before bedtime. Estela Mejia MD Taking Active   calcium carbonate 600 mg calcium (1,500 mg) tablet [id number] No Take 1 tablet (600 mg) by mouth in the morning and 1 tablet (600 mg) in the evening and 1 tablet (600 mg) before bedtime. Estela Mejia MD Taking Active   cholecalciferol (Vitamin D-3) 25 MCG (1000 UT) capsule [id number]  TAKE 1 CAPSULE M, W,Fri, Sat Estela Mejia MD  Active   clobetasol (Temovate) 0.05 % cream [id number]  Apply cream topically to affected areas on the trunk & extremities twice daily until clear, then as needed for flares cover with thick moisturizer, avoid groin, face Estela Mejia MD  Active   famotidine (Pepcid) 20 mg tablet [id number] No Take 1 tablet (20 mg) by mouth in the morning and 1 tablet (20 mg) before bedtime. Estela Mejia MD Taking Active   fluticasone (Flonase) 50 mcg/actuation nasal spray [id number]  Administer 1 spray into each nostril once daily. Estela Mejia MD  Active   furosemide (Lasix) 20 mg tablet [id number]  Take 0.5 tablets (10 mg) by mouth once daily. Noa Coelho MD  Active   ketoconazole (NIZOral) 2 % shampoo [id number]  WASH SCALP DAILY UNTIL CLEAR. THEN USE 2-3 TIMES A WEEK FOR MAINTENANCE, LET SIT FOR 3-5 MINUTES THEN RINSE Estela Mejia MD  Active   loratadine (Claritin) 10 mg tablet [id number]  Take 1 tablet (10 mg) by mouth once daily. Noa Coelho MD  Active   mometasone (Elocon) 0.1 %  cream [id number]  APPLY CREAM TOPICALLY TWICE DAILY TO AFFECTED AREA ON LEG UNTIL RESOLVED, TAPER AS DIRECTED Historical Provider, MD  Active   nebivolol (Bystolic) 10 mg tablet [id number] No Take 1 tablet (10 mg) by mouth once daily. Historical Provider, MD Taking Active   nitroglycerin (Nitrostat) 0.4 mg SL tablet [id number]  Place 1 tablet (0.4 mg) under the tongue every 5 minutes if needed for chest pain. For up to 3 doses. Call 911 if pain persists. Historical Provider, MD  Active   oxyquinoline-sod.lauryl sulfat (Trimo-Stovall Jelly) 0.025-0.01 % gel [id number]  Insert into the vagina. Historical Provider, MD  Active   potassium chloride CR (Klor-Con M20) 20 mEq ER tablet [id number]  Take a tab Monday-Wednesday and Friday Historical Provider, MD  Active   psyllium (Metamucil) powder [id number]  Take by mouth once daily. Historical Provider, MD  Active   psyllium husk, with sugar, (Metamucil, with sugar,) 3.4 gram/12 gram powder [id number]  Take by mouth. Historical Provider, MD  Active                  Physical Exam  Constitutional:       Appearance: Normal appearance.   HENT:      Head: Normocephalic.      Comments: Patient still has a palpable hematoma in her left cheek and visible bruising which is purple to yellow and green on the right cheek from a recent fall.  She has a slight nasal bone fracture however they have elected to not do surgery and just monitor her since her airways are clear and patent.     Nose: Nose normal.   Eyes:      Extraocular Movements: Extraocular movements intact.      Pupils: Pupils are equal, round, and reactive to light.   Cardiovascular:      Rate and Rhythm: Normal rate and regular rhythm.   Pulmonary:      Breath sounds: Normal breath sounds.   Abdominal:      General: Abdomen is flat. Bowel sounds are normal.      Palpations: Abdomen is soft.   Musculoskeletal:      Right lower leg: No edema.      Left lower leg: No edema.   Neurological:      Mental Status: She is alert.     /60        Assessment/Plan   Problem List Items Addressed This Visit          Circulatory    Atrial tachycardia (CMS/HCC)     Patient has a history of atrial tachycardia however today her pulse is 7267 and stable.         Hypertension     Repeat blood pressure is 130/60 and stable so no changes will be made.            Hematologic    Anemia - Primary    Relevant Orders    CBC       Other    Face lacerations, subsequent encounter     Facial lacerations have healed over nicely and have if no visible scabbing although there is still bruising over the right cheek.         Facial contusion, subsequent encounter    Fall due to tripping on loose carpet     Annual wellness visit was finished February 1, 2023  Annual labs were completed June 1 and reviewed with the patient today  Regular follow-up with me will be in 4 months or as needed         It has been a pleasure seeing you.

## 2023-06-07 NOTE — ASSESSMENT & PLAN NOTE
Facial lacerations have healed over nicely and have if no visible scabbing although there is still bruising over the right cheek.

## 2023-06-20 ENCOUNTER — LAB (OUTPATIENT)
Dept: LAB | Facility: LAB | Age: 88
End: 2023-06-20
Payer: MEDICARE

## 2023-06-20 DIAGNOSIS — D64.9 ANEMIA, UNSPECIFIED TYPE: ICD-10-CM

## 2023-06-20 LAB
ERYTHROCYTE DISTRIBUTION WIDTH (RATIO) BY AUTOMATED COUNT: 13.3 % (ref 11.5–14.5)
ERYTHROCYTE MEAN CORPUSCULAR HEMOGLOBIN CONCENTRATION (G/DL) BY AUTOMATED: 33.1 G/DL (ref 32–36)
ERYTHROCYTE MEAN CORPUSCULAR VOLUME (FL) BY AUTOMATED COUNT: 95 FL (ref 80–100)
ERYTHROCYTES (10*6/UL) IN BLOOD BY AUTOMATED COUNT: 3.68 X10E12/L (ref 4–5.2)
HEMATOCRIT (%) IN BLOOD BY AUTOMATED COUNT: 35 % (ref 36–46)
HEMOGLOBIN (G/DL) IN BLOOD: 11.6 G/DL (ref 12–16)
LEUKOCYTES (10*3/UL) IN BLOOD BY AUTOMATED COUNT: 10.3 X10E9/L (ref 4.4–11.3)
NRBC (PER 100 WBCS) BY AUTOMATED COUNT: 0 /100 WBC (ref 0–0)
PLATELETS (10*3/UL) IN BLOOD AUTOMATED COUNT: 240 X10E9/L (ref 150–450)

## 2023-06-20 PROCEDURE — 36415 COLL VENOUS BLD VENIPUNCTURE: CPT

## 2023-06-20 PROCEDURE — 85027 COMPLETE CBC AUTOMATED: CPT

## 2023-06-21 ENCOUNTER — TELEPHONE (OUTPATIENT)
Dept: PRIMARY CARE | Facility: CLINIC | Age: 88
End: 2023-06-21
Payer: MEDICARE

## 2023-06-21 DIAGNOSIS — D64.9 ANEMIA, UNSPECIFIED TYPE: Primary | ICD-10-CM

## 2023-06-21 NOTE — TELEPHONE ENCOUNTER
Patient notified and read message.  BN    ----- Message from Noa Coelho MD sent at 6/21/2023 10:05 AM EDT -----  Please notify patient her hemoglobin is still anemic but it is improving.  Hemoglobin is now 11.6 so I would like her to repeat another complete blood count in 1 month to make sure she is returning to normal after her nosebleed and head trauma.

## 2023-07-06 ENCOUNTER — TELEPHONE (OUTPATIENT)
Dept: PRIMARY CARE | Facility: CLINIC | Age: 88
End: 2023-07-06
Payer: MEDICARE

## 2023-07-06 NOTE — TELEPHONE ENCOUNTER
Patient calling wondering if she should get her blood work sooner for anemia ,she is feeling more tired, loss of appetite, more weaker.  Wants doctors opinion. [phone].

## 2023-07-07 ENCOUNTER — LAB (OUTPATIENT)
Dept: LAB | Facility: LAB | Age: 88
End: 2023-07-07
Payer: MEDICARE

## 2023-07-07 DIAGNOSIS — D64.9 ANEMIA, UNSPECIFIED TYPE: ICD-10-CM

## 2023-07-07 DIAGNOSIS — D64.9 ANEMIA, UNSPECIFIED TYPE: Primary | ICD-10-CM

## 2023-07-07 PROCEDURE — 85027 COMPLETE CBC AUTOMATED: CPT

## 2023-07-07 PROCEDURE — 36415 COLL VENOUS BLD VENIPUNCTURE: CPT

## 2023-07-08 LAB
ERYTHROCYTE DISTRIBUTION WIDTH (RATIO) BY AUTOMATED COUNT: 13.1 % (ref 11.5–14.5)
ERYTHROCYTE MEAN CORPUSCULAR HEMOGLOBIN CONCENTRATION (G/DL) BY AUTOMATED: 32.1 G/DL (ref 32–36)
ERYTHROCYTE MEAN CORPUSCULAR VOLUME (FL) BY AUTOMATED COUNT: 97 FL (ref 80–100)
ERYTHROCYTES (10*6/UL) IN BLOOD BY AUTOMATED COUNT: 3.71 X10E12/L (ref 4–5.2)
HEMATOCRIT (%) IN BLOOD BY AUTOMATED COUNT: 36.1 % (ref 36–46)
HEMOGLOBIN (G/DL) IN BLOOD: 11.6 G/DL (ref 12–16)
LEUKOCYTES (10*3/UL) IN BLOOD BY AUTOMATED COUNT: 10 X10E9/L (ref 4.4–11.3)
NRBC (PER 100 WBCS) BY AUTOMATED COUNT: 0 /100 WBC (ref 0–0)
PLATELETS (10*3/UL) IN BLOOD AUTOMATED COUNT: 217 X10E9/L (ref 150–450)

## 2023-07-11 ENCOUNTER — TELEPHONE (OUTPATIENT)
Dept: PRIMARY CARE | Facility: CLINIC | Age: 88
End: 2023-07-11
Payer: MEDICARE

## 2023-07-11 NOTE — TELEPHONE ENCOUNTER
Patient notified and read message.  BN    ----- Message from Noa Coelho MD sent at 7/10/2023  1:21 PM EDT -----  Please notify patient her hemoglobin is stable at 11.6%.  I do not need any further studies at this time.

## 2023-07-17 DIAGNOSIS — J30.9 ALLERGIC RHINITIS, UNSPECIFIED SEASONALITY, UNSPECIFIED TRIGGER: ICD-10-CM

## 2023-07-18 RX ORDER — FLUTICASONE PROPIONATE 50 MCG
1 SPRAY, SUSPENSION (ML) NASAL DAILY
Qty: 16 G | Refills: 0 | Status: SHIPPED | OUTPATIENT
Start: 2023-07-18 | End: 2023-09-09

## 2023-07-19 ENCOUNTER — LAB (OUTPATIENT)
Dept: LAB | Facility: LAB | Age: 88
End: 2023-07-19
Payer: MEDICARE

## 2023-07-19 DIAGNOSIS — D64.9 ANEMIA, UNSPECIFIED TYPE: ICD-10-CM

## 2023-07-19 PROCEDURE — 85027 COMPLETE CBC AUTOMATED: CPT

## 2023-07-19 PROCEDURE — 36415 COLL VENOUS BLD VENIPUNCTURE: CPT

## 2023-07-20 ENCOUNTER — TELEPHONE (OUTPATIENT)
Dept: PRIMARY CARE | Facility: CLINIC | Age: 88
End: 2023-07-20
Payer: MEDICARE

## 2023-07-20 LAB
ERYTHROCYTE DISTRIBUTION WIDTH (RATIO) BY AUTOMATED COUNT: 13 % (ref 11.5–14.5)
ERYTHROCYTE MEAN CORPUSCULAR HEMOGLOBIN CONCENTRATION (G/DL) BY AUTOMATED: 33.2 G/DL (ref 32–36)
ERYTHROCYTE MEAN CORPUSCULAR VOLUME (FL) BY AUTOMATED COUNT: 95 FL (ref 80–100)
ERYTHROCYTES (10*6/UL) IN BLOOD BY AUTOMATED COUNT: 4.01 X10E12/L (ref 4–5.2)
HEMATOCRIT (%) IN BLOOD BY AUTOMATED COUNT: 38 % (ref 36–46)
HEMOGLOBIN (G/DL) IN BLOOD: 12.6 G/DL (ref 12–16)
LEUKOCYTES (10*3/UL) IN BLOOD BY AUTOMATED COUNT: 10.8 X10E9/L (ref 4.4–11.3)
NRBC (PER 100 WBCS) BY AUTOMATED COUNT: 0 /100 WBC (ref 0–0)
PLATELETS (10*3/UL) IN BLOOD AUTOMATED COUNT: 215 X10E9/L (ref 150–450)

## 2023-07-20 NOTE — TELEPHONE ENCOUNTER
Patient notified and read message.  BN      ----- Message from Noa Coelho MD sent at 7/20/2023  7:31 AM EDT -----  These notify patient her CBC is back to normal and she no longer has anemia.

## 2023-07-25 DIAGNOSIS — I10 PRIMARY HYPERTENSION: ICD-10-CM

## 2023-07-26 RX ORDER — AMLODIPINE BESYLATE 5 MG/1
TABLET ORAL
Qty: 90 TABLET | Refills: 3 | OUTPATIENT
Start: 2023-07-26

## 2023-08-16 ENCOUNTER — TELEPHONE (OUTPATIENT)
Dept: PRIMARY CARE | Facility: CLINIC | Age: 88
End: 2023-08-16
Payer: MEDICARE

## 2023-08-16 NOTE — TELEPHONE ENCOUNTER
pT AWARE. APT MADE. SHE WILL ASK THE DENTIST TO SEND RX BECAUSE SHE HAS TO CALL THEM ANYWAY, BUT WILL CALL US WITH PROBLEMS

## 2023-08-16 NOTE — TELEPHONE ENCOUNTER
Pt calling in for refill of amoxicillin for dentist-she wants to know if she still needs to take it, it's been 13 years and they said that sometimes they stop doing this.  Also, she wanted to know if she can have something to take daily for anxiety? Do you want me to make her an apt?

## 2023-08-29 ENCOUNTER — OFFICE VISIT (OUTPATIENT)
Dept: PRIMARY CARE | Facility: CLINIC | Age: 88
End: 2023-08-29
Payer: MEDICARE

## 2023-08-29 VITALS
SYSTOLIC BLOOD PRESSURE: 136 MMHG | HEIGHT: 62 IN | HEART RATE: 69 BPM | OXYGEN SATURATION: 97 % | DIASTOLIC BLOOD PRESSURE: 50 MMHG | WEIGHT: 119.2 LBS | BODY MASS INDEX: 21.94 KG/M2

## 2023-08-29 DIAGNOSIS — F41.1 ANXIETY STATE: ICD-10-CM

## 2023-08-29 DIAGNOSIS — I10 PRIMARY HYPERTENSION: ICD-10-CM

## 2023-08-29 DIAGNOSIS — M19.90 OSTEOARTHRITIS, UNSPECIFIED OSTEOARTHRITIS TYPE, UNSPECIFIED SITE: Primary | ICD-10-CM

## 2023-08-29 PROBLEM — S00.83XD FACIAL CONTUSION, SUBSEQUENT ENCOUNTER: Status: RESOLVED | Noted: 2023-05-19 | Resolved: 2023-08-29

## 2023-08-29 PROBLEM — S01.81XD: Status: RESOLVED | Noted: 2023-05-19 | Resolved: 2023-08-29

## 2023-08-29 PROBLEM — I35.1 AORTIC VALVE REGURGITATION, ACQUIRED: Status: ACTIVE | Noted: 2023-08-29
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
## 2023-08-29 PROCEDURE — 99214 OFFICE O/P EST MOD 30 MIN: CPT | Performed by: INTERNAL MEDICINE

## 2023-08-29 PROCEDURE — 1159F MED LIST DOCD IN RCRD: CPT | Performed by: INTERNAL MEDICINE

## 2023-08-29 PROCEDURE — 3075F SYST BP GE 130 - 139MM HG: CPT | Performed by: INTERNAL MEDICINE

## 2023-08-29 PROCEDURE — 1036F TOBACCO NON-USER: CPT | Performed by: INTERNAL MEDICINE

## 2023-08-29 PROCEDURE — 1160F RVW MEDS BY RX/DR IN RCRD: CPT | Performed by: INTERNAL MEDICINE

## 2023-08-29 PROCEDURE — 3078F DIAST BP <80 MM HG: CPT | Performed by: INTERNAL MEDICINE

## 2023-08-29 PROCEDURE — 1126F AMNT PAIN NOTED NONE PRSNT: CPT | Performed by: INTERNAL MEDICINE

## 2023-08-29 RX ORDER — ESTRADIOL 0.1 MG/G
CREAM VAGINAL
COMMUNITY
Start: 2023-06-15

## 2023-08-29 RX ORDER — AMLODIPINE BESYLATE 5 MG/1
5 TABLET ORAL 2 TIMES DAILY
Qty: 180 TABLET | Refills: 3 | Status: SHIPPED | OUTPATIENT
Start: 2023-08-29

## 2023-08-29 RX ORDER — AMLODIPINE BESYLATE 5 MG/1
5 TABLET ORAL 2 TIMES DAILY
Qty: 90 TABLET | Refills: 0 | Status: SHIPPED | OUTPATIENT
Start: 2023-08-29 | End: 2023-08-29 | Stop reason: SDUPTHER

## 2023-08-29 RX ORDER — NEOMYCIN SULFATE, POLYMYXIN B SULFATE AND DEXAMETHASONE 3.5; 10000; 1 MG/ML; [USP'U]/ML; MG/ML
SUSPENSION/ DROPS OPHTHALMIC
COMMUNITY
Start: 2023-05-30

## 2023-08-29 RX ORDER — AMOXICILLIN 500 MG/1
CAPSULE ORAL
COMMUNITY
Start: 2023-08-16

## 2023-08-29 RX ORDER — CONJUGATED ESTROGENS 0.62 MG/G
CREAM VAGINAL
COMMUNITY
Start: 2023-07-17

## 2023-08-29 RX ORDER — SERTRALINE HYDROCHLORIDE 25 MG/1
25 TABLET, FILM COATED ORAL DAILY
Qty: 30 TABLET | Refills: 5 | Status: SHIPPED | OUTPATIENT
Start: 2023-08-29 | End: 2024-02-25

## 2023-08-29 ASSESSMENT — ENCOUNTER SYMPTOMS
SORE THROAT: 0
LIGHT-HEADEDNESS: 0
DIZZINESS: 0
PALPITATIONS: 0
NERVOUS/ANXIOUS: 1
CONSTIPATION: 0
CHILLS: 0
HEADACHES: 0
VOMITING: 0
SHORTNESS OF BREATH: 0
FREQUENCY: 0
FATIGUE: 0
FEVER: 0
AGITATION: 1
ARTHRALGIAS: 0
NAUSEA: 0
WEAKNESS: 0
DIARRHEA: 0
MYALGIAS: 0
DIFFICULTY URINATING: 0
DYSURIA: 0
HEMATURIA: 0
COUGH: 0

## 2023-08-29 ASSESSMENT — PAIN SCALES - GENERAL: PAINLEVEL: 0-NO PAIN

## 2023-08-29 NOTE — ASSESSMENT & PLAN NOTE
Pressure is mildly elevated and recheck was improved but still elevated.  Patient will stay on the amlodipine 5 mg twice daily and was given a prescription refill

## 2023-08-29 NOTE — ASSESSMENT & PLAN NOTE
Patient has generalized anxiety disorder which is clearly worsened after a fall.  At this time we are going to start sertraline 25 mg daily and see her back in 1 month to see how she is doing.

## 2023-08-29 NOTE — PROGRESS NOTES
Subjective   Patient ID: Gris Salcido is a 89 y.o. female who presents for anxiety, HTN, and cholesterol management.  BN    Patient is here for routine follow-up for hypertension, palpitations anxiety and cholesterol.  Dr Ramicone increased her amlodipine back to 5 mg twice a day and she has noticed that is increasing her ankle edema again.  She said it is tolerable and she is willing to continue taking the medication    The larger issue at this time is her anxiety.  Since the fall the patient has definitely been more anxious and worried about falling again.  She wanted to know if something like a benzodiazepine like Xanax or Ativan could help her.  Although it can help her I do not recommend these medications in a patient of advanced years at 89.  I explained that these medications can be addictive and can also cause things like delirium confusion and sedation in older patients.  I have recommended instead a low-dose serotonin drug like sertraline 25 mg daily to see if we can use it on a daily basis to calm her down without getting any of the severe side effects.    The patient's niece Connor who is a retired nurse is present today and agrees with this plan as well.            Review of Systems   Constitutional:  Negative for chills, fatigue and fever.   HENT:  Negative for sore throat.    Eyes:  Negative for visual disturbance.   Respiratory:  Negative for cough and shortness of breath.    Cardiovascular:  Negative for chest pain, palpitations and leg swelling.   Gastrointestinal:  Negative for constipation, diarrhea, nausea and vomiting.   Genitourinary:  Negative for difficulty urinating, dysuria, frequency, hematuria and urgency.   Musculoskeletal:  Negative for arthralgias and myalgias.   Skin:  Negative for rash.   Neurological:  Negative for dizziness, syncope, weakness, light-headedness and headaches.   Psychiatric/Behavioral:  Positive for agitation. The patient is nervous/anxious.        Objective    Medication Documentation Review Audit       Reviewed by Noa Coelho MD (Physician) on 08/29/23 at 1613      Medication Order Taking? Sig Documenting Provider Last Dose Status   acetaminophen (TylenoL) 325 mg tablet [id number]  Tylenol 325 MG Oral Tablet   Refills: 0       Active Estela Mejia MD  Active   ALPRAZolam (Xanax) 0.25 mg tablet [id number]  Take 1 tablet (0.25 mg) by mouth once daily as needed for anxiety (once daily as needed for anxiety). Noa Coelho MD  Active     Discontinued 08/29/23 1341     Discontinued 08/29/23 1342   amLODIPine (Norvasc) 5 mg tablet [id number]  Take 1 tablet (5 mg) by mouth 2 times a day. Noa Coelho MD  Active   amoxicillin (Amoxil) 500 mg capsule [id number] Yes TAKE FOUR CAPSULES BY MOUTH ONE HOUR BEFORE APPOINTMENT Estela Provider, MD  Active   atorvastatin (Lipitor) 10 mg tablet [id number]  Take 0.5 tablets (5 mg) by mouth once daily. Estela Provider, MD  Active   bacitracin-polymyxin B (Polysporin) ophthalmic ointment [id number]  1 Application 4 times a day. Historical Provider, MD  Active   benazepril (Lotensin) 40 mg tablet [id number]  TAKE 1 TABLET BY MOUTH TWICE A  DAY Noa Coelho MD  Active   calcium carbonate 600 mg calcium (1,500 mg) tablet [id number]  Take 1 tablet (600 mg) by mouth in the morning and 1 tablet (600 mg) in the evening and 1 tablet (600 mg) before bedtime. Estela Mejia MD  Active   cholecalciferol (Vitamin D-3) 25 MCG (1000 UT) capsule [id number]  TAKE 1 CAPSULE M, W,Fri, Sat Estela Mejia MD  Active   clobetasol (Temovate) 0.05 % cream [id number]  Apply cream topically to affected areas on the trunk & extremities twice daily until clear, then as needed for flares cover with thick moisturizer, avoid groin, face Estela Provider, MD  Active   estradiol (Estrace) 0.01 % (0.1 mg/gram) vaginal cream [id number] Yes  Estela Mejia MD  Active   famotidine (Pepcid) 20 mg tablet [id number]  Take 1 tablet (20 mg) by mouth in  the morning and 1 tablet (20 mg) before bedtime. Historical Provider, MD  Active   fluticasone (Flonase) 50 mcg/actuation nasal spray [id number]  Administer 1 spray into each nostril once daily. Noa Coelho MD  Active   furosemide (Lasix) 20 mg tablet [id number]  Take 0.5 tablets (10 mg) by mouth once daily. Noa Coelho MD  Active   ketoconazole (NIZOral) 2 % shampoo [id number]  WASH SCALP DAILY UNTIL CLEAR. THEN USE 2-3 TIMES A WEEK FOR MAINTENANCE, LET SIT FOR 3-5 MINUTES THEN RINSE Historical Provider, MD  Active   loratadine (Claritin) 10 mg tablet [id number]  Take 1 tablet (10 mg) by mouth once daily. Noa Coelho MD   23   mometasone (Elocon) 0.1 % cream [id number]  APPLY CREAM TOPICALLY TWICE DAILY TO AFFECTED AREA ON LEG UNTIL RESOLVED, TAPER AS DIRECTED Historical MD Jackie  Active   nebivolol (Bystolic) 10 mg tablet [id number]  Take 1 tablet (10 mg) by mouth once daily. Historical Provider, MD  Active   neomycin-polymyxin-dexAMETHasone (Maxitrol) 3.5mg/mL-10,000 unit/mL-0.1 % ophthalmic suspension [id number] Yes  Historical Provider, MD  Active   nitroglycerin (Nitrostat) 0.4 mg SL tablet [id number]  Place 1 tablet (0.4 mg) under the tongue every 5 minutes if needed for chest pain. For up to 3 doses. Call 911 if pain persists. Historical Provider, MD  Active   oxyquinoline-sod.lauryl sulfat (Trimo-Stovall Jelly) 0.025-0.01 % gel [id number]  Insert into the vagina. Historical Provider, MD  Active   potassium chloride CR (Klor-Con M20) 20 mEq ER tablet [id number]  Take a tab Monday-Wednesday and Friday Historical Provider, MD  Active   Premarin vaginal cream [id number] Yes USE 1G VAGINALLY ONCE DAILY AT BEDTIME AS DIRECTED Historical Provider, MD  Active   psyllium (Metamucil) powder [id number]  Take by mouth once daily. Historical Provider, MD  Active   psyllium husk, with sugar, (Metamucil, with sugar,) 3.4 gram/12 gram powder [id number]  Take by mouth. Historical Provider, MD  Active   sertraline  "(Zoloft) 25 mg tablet [id number]  Take 1 tablet (25 mg) by mouth once daily. Noa Coelho MD  Active                  Physical Exam  Constitutional:       Appearance: Normal appearance.   HENT:      Head: Normocephalic and atraumatic.      Nose: Nose normal.   Eyes:      Extraocular Movements: Extraocular movements intact.      Pupils: Pupils are equal, round, and reactive to light.   Cardiovascular:      Rate and Rhythm: Normal rate and regular rhythm.   Pulmonary:      Breath sounds: Normal breath sounds.   Abdominal:      General: Abdomen is flat. Bowel sounds are normal.      Palpations: Abdomen is soft.   Musculoskeletal:      Right lower leg: No edema.      Left lower leg: No edema.   Neurological:      Mental Status: She is alert.     /50   Pulse 69   Ht 1.568 m (5' 1.75\") Comment: with shoes  Wt 54.1 kg (119 lb 3.2 oz)   SpO2 97%   BMI 21.98 kg/m²       Assessment/Plan   Problem List Items Addressed This Visit       Anxiety state     Patient has generalized anxiety disorder which is clearly worsened after a fall.  At this time we are going to start sertraline 25 mg daily and see her back in 1 month to see how she is doing.         Relevant Medications    sertraline (Zoloft) 25 mg tablet    Osteoarthritis - Primary    Relevant Orders    Disability Placard    Hypertension     Pressure is mildly elevated and recheck was improved but still elevated.  Patient will stay on the amlodipine 5 mg twice daily and was given a prescription refill         Relevant Medications    amLODIPine (Norvasc) 5 mg tablet              It has been a pleasure seeing you.    "

## 2023-08-29 NOTE — PATIENT INSTRUCTIONS
Start sertrsline  25 mg a day    Follow up Dr Coelho in 1 month for anxiety 30  min appt      Ways to Help Prevent Falls at Home    Quick Tips   ? Ask for help if you need it. Most people want to help!   ? Get up slowly after sitting or laying down   ? Wear a medical alert device or keep cell phone in your pocket   ? Use night lights, especially areas near a bathroom   ? Keep the items you use often within reach on a small stool or end table   ? Use an assistive device such as walker or cane, as directed by provider/physical therapy   ? Use a non-slip mat and grab bars in your bathroom. Look for home health sections for best options     Other Areas to Focus On   ? Exercise and nutrition: Regular exercise or taking a falls prevention class are great ways improve strength and balance. Don’t forget to stay hydrated and bring a snack!   ? Medicine side effects: Some medicines can make you sleepy or dizzy, which could cause a fall. Ask your healthcare provider about the side effects your medicines could cause. Be sure to let them know if you take any vitamins or supplements as well.   ? Tripping hazards: Remove items you could trip on, such as loose mats, rugs, cords, and clutter. Wear closed toe shoes with rubber soles.   ? Health and wellness: Get regular checkups with your healthcare provider, plus routine vision and hearing screenings. Talk with your healthcare provider about:   o Your medicines and the possible side effects - bring them in a bag if that is easier!   o Problems with balance or feeling dizzy   o Ways to promote bone health, such as Vitamin D and calcium supplements   o Questions or concerns about falling     *Ask your healthcare team if you have questions     [facility], 2022         Ways to Help Prevent Falls at Home    Quick Tips   ? Ask for help if you need it. Most people want to help!   ? Get up slowly after sitting or laying down   ? Wear a medical alert device or keep cell phone in  your pocket   ? Use night lights, especially areas near a bathroom   ? Keep the items you use often within reach on a small stool or end table   ? Use an assistive device such as walker or cane, as directed by provider/physical therapy   ? Use a non-slip mat and grab bars in your bathroom. Look for home health sections for best options     Other Areas to Focus On   ? Exercise and nutrition: Regular exercise or taking a falls prevention class are great ways improve strength and balance. Don’t forget to stay hydrated and bring a snack!   ? Medicine side effects: Some medicines can make you sleepy or dizzy, which could cause a fall. Ask your healthcare provider about the side effects your medicines could cause. Be sure to let them know if you take any vitamins or supplements as well.   ? Tripping hazards: Remove items you could trip on, such as loose mats, rugs, cords, and clutter. Wear closed toe shoes with rubber soles.   ? Health and wellness: Get regular checkups with your healthcare provider, plus routine vision and hearing screenings. Talk with your healthcare provider about:   o Your medicines and the possible side effects - bring them in a bag if that is easier!   o Problems with balance or feeling dizzy   o Ways to promote bone health, such as Vitamin D and calcium supplements   o Questions or concerns about falling     *Ask your healthcare team if you have questions     ©[facility], 2022

## 2023-09-26 ENCOUNTER — OFFICE VISIT (OUTPATIENT)
Dept: PRIMARY CARE | Facility: CLINIC | Age: 88
End: 2023-09-26
Payer: MEDICARE

## 2023-09-26 VITALS
HEIGHT: 62 IN | WEIGHT: 118.2 LBS | BODY MASS INDEX: 21.75 KG/M2 | DIASTOLIC BLOOD PRESSURE: 56 MMHG | SYSTOLIC BLOOD PRESSURE: 143 MMHG | HEART RATE: 67 BPM

## 2023-09-26 DIAGNOSIS — Z23 FLU VACCINE NEED: ICD-10-CM

## 2023-09-26 DIAGNOSIS — F41.1 ANXIETY STATE: Primary | ICD-10-CM

## 2023-09-26 PROCEDURE — 1126F AMNT PAIN NOTED NONE PRSNT: CPT | Performed by: INTERNAL MEDICINE

## 2023-09-26 PROCEDURE — 1159F MED LIST DOCD IN RCRD: CPT | Performed by: INTERNAL MEDICINE

## 2023-09-26 PROCEDURE — 1036F TOBACCO NON-USER: CPT | Performed by: INTERNAL MEDICINE

## 2023-09-26 PROCEDURE — 3078F DIAST BP <80 MM HG: CPT | Performed by: INTERNAL MEDICINE

## 2023-09-26 PROCEDURE — G0008 ADMIN INFLUENZA VIRUS VAC: HCPCS | Performed by: INTERNAL MEDICINE

## 2023-09-26 PROCEDURE — 90662 IIV NO PRSV INCREASED AG IM: CPT | Performed by: INTERNAL MEDICINE

## 2023-09-26 PROCEDURE — 1160F RVW MEDS BY RX/DR IN RCRD: CPT | Performed by: INTERNAL MEDICINE

## 2023-09-26 PROCEDURE — 3077F SYST BP >= 140 MM HG: CPT | Performed by: INTERNAL MEDICINE

## 2023-09-26 PROCEDURE — 99213 OFFICE O/P EST LOW 20 MIN: CPT | Performed by: INTERNAL MEDICINE

## 2023-09-26 ASSESSMENT — ENCOUNTER SYMPTOMS
HEMATURIA: 0
DIZZINESS: 0
WEAKNESS: 0
MYALGIAS: 0
DIFFICULTY URINATING: 0
ARTHRALGIAS: 0
CHILLS: 0
LIGHT-HEADEDNESS: 0
DIARRHEA: 0
HEADACHES: 0
DYSURIA: 0
FREQUENCY: 0
VOMITING: 0
SHORTNESS OF BREATH: 0
FEVER: 0
FATIGUE: 0
PALPITATIONS: 0
SORE THROAT: 0
COUGH: 0
CONSTIPATION: 0
NAUSEA: 0

## 2023-09-26 ASSESSMENT — PATIENT HEALTH QUESTIONNAIRE - PHQ9
1. LITTLE INTEREST OR PLEASURE IN DOING THINGS: NOT AT ALL
2. FEELING DOWN, DEPRESSED OR HOPELESS: NOT AT ALL
SUM OF ALL RESPONSES TO PHQ9 QUESTIONS 1 AND 2: 0

## 2023-09-26 ASSESSMENT — PAIN SCALES - GENERAL: PAINLEVEL: 0-NO PAIN

## 2023-09-26 NOTE — PROGRESS NOTES
Subjective   Patient ID: Gris Salcido is a 89 y.o. female who presents for 1 month follow up for anxiety and general management.  BN    Patient is here for 1 month follow-up on generalized anxiety disorder.  At her last appointment the patient and I discussed it and we agreed she would start sertraline 25 mg daily.  Patient has used Xanax in the past but I am reluctant to have her continue benzodiazepine due to her advanced age.  Patient took 1 sertraline felt very tired that night has not taken any since because she was fearful because of the fatigue factor.  I did explain to the patient that this is a rare side effect and not that usual in patients.  After discussion of the safety profile of the sertraline she is willing to retry it.        Review of Systems   Constitutional:  Negative for chills, fatigue and fever.   HENT:  Negative for sore throat.    Eyes:  Negative for visual disturbance.   Respiratory:  Negative for cough and shortness of breath.    Cardiovascular:  Negative for chest pain, palpitations and leg swelling.   Gastrointestinal:  Negative for constipation, diarrhea, nausea and vomiting.   Genitourinary:  Negative for difficulty urinating, dysuria, frequency, hematuria and urgency.   Musculoskeletal:  Negative for arthralgias and myalgias.   Skin:  Negative for rash.   Neurological:  Negative for dizziness, syncope, weakness, light-headedness and headaches.   Psychiatric/Behavioral:          Patient has generalized anxiety disorders and anxiety issues and hands for many years.  She has used 30 Xanax over the past 6 months during.  Better anxious moments.       Objective   Medication Documentation Review Audit       Reviewed by Noa Coelho MD (Physician) on 09/26/23 at 1345      Medication Order Taking? Sig Documenting Provider Last Dose Status   acetaminophen (TylenoL) 325 mg tablet [id number]  Tylenol 325 MG Oral Tablet   Refills: 0       Active Historical Provider, MD  Active   ALPRAZolam  (Xanax) 0.25 mg tablet [id number]  Take 1 tablet (0.25 mg) by mouth once daily as needed for anxiety (once daily as needed for anxiety). Noa Coelho MD  Active   amLODIPine (Norvasc) 5 mg tablet [id number]  Take 1 tablet (5 mg) by mouth 2 times a day. Noa Coelho MD  Active   amoxicillin (Amoxil) 500 mg capsule [id number]  TAKE FOUR CAPSULES BY MOUTH ONE HOUR BEFORE APPOINTMENT Historical Provider, MD  Active   atorvastatin (Lipitor) 10 mg tablet [id number] No Take 0.5 tablets (5 mg) by mouth once daily. Estela Provider, MD Taking Active   bacitracin-polymyxin B (Polysporin) ophthalmic ointment [id number]  1 Application 4 times a day. Estela Provider, MD  Active   benazepril (Lotensin) 40 mg tablet [id number]  TAKE 1 TABLET BY MOUTH TWICE  DAILY Noa Coelho MD  Active   calcium carbonate 600 mg calcium (1,500 mg) tablet [id number] No Take 1 tablet (600 mg) by mouth in the morning and 1 tablet (600 mg) in the evening and 1 tablet (600 mg) before bedtime. Estela Mejia MD Taking Active   cholecalciferol (Vitamin D-3) 25 MCG (1000 UT) capsule [id number]  TAKE 1 CAPSULE M, W,Fri, Sat Historical Provider, MD  Active   clobetasol (Temovate) 0.05 % cream [id number]  Apply cream topically to affected areas on the trunk & extremities twice daily until clear, then as needed for flares cover with thick moisturizer, avoid groin, face Estela Mejia MD  Active   estradiol (Estrace) 0.01 % (0.1 mg/gram) vaginal cream [id number]   Estela Provider, MD  Active   famotidine (Pepcid) 20 mg tablet [id number] No Take 1 tablet (20 mg) by mouth in the morning and 1 tablet (20 mg) before bedtime. Estela Mejia MD Taking Active   fluticasone (Flonase) 50 mcg/actuation nasal spray [id number]  USE 1 SPRAY IN BOTH NOSTRILS  ONCE DAILY Noa Coelho MD  Active   furosemide (Lasix) 20 mg tablet [id number]  Take 0.5 tablets (10 mg) by mouth once daily. Noa Coelho MD  Active   ketoconazole (NIZOral) 2 % shampoo  [id number]  WASH SCALP DAILY UNTIL CLEAR. THEN USE 2-3 TIMES A WEEK FOR MAINTENANCE, LET SIT FOR 3-5 MINUTES THEN RINSE Historical Provider, MD  Active   loratadine (Claritin) 10 mg tablet [id number]  Take 1 tablet (10 mg) by mouth once daily. Noa Coelho MD   23 0259   mometasone (Elocon) 0.1 % cream [id number]  APPLY CREAM TOPICALLY TWICE DAILY TO AFFECTED AREA ON LEG UNTIL RESOLVED, TAPER AS DIRECTED Historical Provider, MD  Active   nebivolol (Bystolic) 10 mg tablet [id number] No Take 1 tablet (10 mg) by mouth once daily. Historical Provider, MD Taking Active   neomycin-polymyxin-dexAMETHasone (Maxitrol) 3.5mg/mL-10,000 unit/mL-0.1 % ophthalmic suspension [id number]   Historical Provider, MD  Active   nitroglycerin (Nitrostat) 0.4 mg SL tablet [id number]  Place 1 tablet (0.4 mg) under the tongue every 5 minutes if needed for chest pain. For up to 3 doses. Call 911 if pain persists. Historical Provider, MD  Active   oxyquinoline-sod.lauryl sulfat (Trimo-Stovall Jelly) 0.025-0.01 % gel [id number]  Insert into the vagina. Historical Provider, MD  Active   potassium chloride CR (Klor-Con M20) 20 mEq ER tablet [id number]  Take a tab Monday-Wednesday and Friday Historical Provider, MD  Active   Premarin vaginal cream [id number]  USE 1G VAGINALLY ONCE DAILY AT BEDTIME AS DIRECTED Historical Provider, MD  Active   psyllium (Metamucil) powder [id number]  Take by mouth once daily. Historical Provider, MD  Active   psyllium husk, with sugar, (Metamucil, with sugar,) 3.4 gram/12 gram powder [id number]  Take by mouth. Historical Provider, MD  Active   sertraline (Zoloft) 25 mg tablet [id number]  Take 1 tablet (25 mg) by mouth once daily. Noa Coelho MD  Active                  Allergies   Allergen Reactions    Erythromycin Diarrhea and Shortness of breath    Amlodipine Other     Ankle edema in higher doses    Cephalexin Unknown and Other     sore mouth    Hydralazine Unknown     Chest pain    Hydrochlorothiazide Unknown     "Lisinopril-Hydrochlorothiazide Other    Nifedipine Unknown    Streptomycin Unknown    Tetracyclines Unknown    Valsartan Unknown     Physical Exam  Constitutional:       Appearance: Normal appearance.   HENT:      Head: Normocephalic and atraumatic.      Nose: Nose normal.   Eyes:      Extraocular Movements: Extraocular movements intact.      Pupils: Pupils are equal, round, and reactive to light.   Cardiovascular:      Rate and Rhythm: Normal rate and regular rhythm.      Heart sounds: Murmur heard.   Pulmonary:      Breath sounds: Normal breath sounds.   Abdominal:      General: Abdomen is flat. Bowel sounds are normal.      Palpations: Abdomen is soft.   Musculoskeletal:      Right lower leg: No edema.      Left lower leg: No edema.      Comments: He has significant kyphosis and scoliosis with a hump over her right posterior upper shoulder and back..   Neurological:      Mental Status: She is alert.     /56 (BP Location: Left arm, Patient Position: Sitting)   Pulse 67   Ht 1.562 m (5' 1.5\") Comment: with shoes  Wt 53.6 kg (118 lb 3.2 oz)   BMI 21.97 kg/m²       Assessment/Plan   Problem List Items Addressed This Visit       Anxiety state - Primary     After a further discussion today the patient wants to retry the sertraline.  She will take 25 mg daily until I see her back in a few weeks.  She was instructed to call if there are any issues or if she is unable to take it.          Other Visit Diagnoses       Flu vaccine need        Relevant Orders    Flu vaccine, quadrivalent, high-dose, preservative free, age 65y+ (FLUZONE) (Completed)                   It has been a pleasure seeing you.  Noa Coelho MD    "

## 2023-09-26 NOTE — ASSESSMENT & PLAN NOTE
After a further discussion today the patient wants to retry the sertraline.  She will take 25 mg daily until I see her back in a few weeks.  She was instructed to call if there are any issues or if she is unable to take it.

## 2023-10-05 ENCOUNTER — TELEPHONE (OUTPATIENT)
Dept: PRIMARY CARE | Facility: CLINIC | Age: 88
End: 2023-10-05
Payer: MEDICARE

## 2023-10-05 NOTE — TELEPHONE ENCOUNTER
Patient calling-was exposed to covid. Is feeling weak, lack of appetite just not well. Wants to know if covid or new medicine covid she was given. Recommened by nurse to do covid test first, then go from there.

## 2023-10-09 ENCOUNTER — TELEPHONE (OUTPATIENT)
Dept: PRIMARY CARE | Facility: CLINIC | Age: 88
End: 2023-10-09
Payer: MEDICARE

## 2023-10-09 NOTE — TELEPHONE ENCOUNTER
Pt is asking how many days should she cut her Zoloft in half. This is day number 3. Is this indefinite?

## 2023-10-09 NOTE — TELEPHONE ENCOUNTER
Pt called back and is asking to go back on the Xanax prn. If you approve this when will she stop the Zoloft?

## 2023-10-10 ENCOUNTER — APPOINTMENT (OUTPATIENT)
Dept: PRIMARY CARE | Facility: CLINIC | Age: 88
End: 2023-10-10
Payer: MEDICARE

## 2023-10-17 ENCOUNTER — OFFICE VISIT (OUTPATIENT)
Dept: PRIMARY CARE | Facility: CLINIC | Age: 88
End: 2023-10-17
Payer: MEDICARE

## 2023-10-17 VITALS
OXYGEN SATURATION: 97 % | SYSTOLIC BLOOD PRESSURE: 128 MMHG | HEART RATE: 74 BPM | BODY MASS INDEX: 22.2 KG/M2 | DIASTOLIC BLOOD PRESSURE: 60 MMHG | WEIGHT: 119.4 LBS

## 2023-10-17 DIAGNOSIS — E78.5 HYPERLIPIDEMIA, UNSPECIFIED HYPERLIPIDEMIA TYPE: Primary | ICD-10-CM

## 2023-10-17 DIAGNOSIS — F41.1 ANXIETY STATE: ICD-10-CM

## 2023-10-17 PROCEDURE — 1159F MED LIST DOCD IN RCRD: CPT | Performed by: INTERNAL MEDICINE

## 2023-10-17 PROCEDURE — 1036F TOBACCO NON-USER: CPT | Performed by: INTERNAL MEDICINE

## 2023-10-17 PROCEDURE — 3074F SYST BP LT 130 MM HG: CPT | Performed by: INTERNAL MEDICINE

## 2023-10-17 PROCEDURE — 3078F DIAST BP <80 MM HG: CPT | Performed by: INTERNAL MEDICINE

## 2023-10-17 PROCEDURE — 1160F RVW MEDS BY RX/DR IN RCRD: CPT | Performed by: INTERNAL MEDICINE

## 2023-10-17 PROCEDURE — 99214 OFFICE O/P EST MOD 30 MIN: CPT | Performed by: INTERNAL MEDICINE

## 2023-10-17 PROCEDURE — 1126F AMNT PAIN NOTED NONE PRSNT: CPT | Performed by: INTERNAL MEDICINE

## 2023-10-17 RX ORDER — ALPRAZOLAM 0.25 MG/1
0.25 TABLET ORAL DAILY PRN
Qty: 90 TABLET | Refills: 0 | Status: SHIPPED | OUTPATIENT
Start: 2023-10-17

## 2023-10-17 RX ORDER — ATORVASTATIN CALCIUM 10 MG/1
5 TABLET, FILM COATED ORAL DAILY
Qty: 90 TABLET | Refills: 3 | Status: SHIPPED | OUTPATIENT
Start: 2023-10-17

## 2023-10-17 ASSESSMENT — ENCOUNTER SYMPTOMS
LIGHT-HEADEDNESS: 0
DIFFICULTY URINATING: 0
DIZZINESS: 0
WEAKNESS: 0
NERVOUS/ANXIOUS: 1
CONSTIPATION: 0
DIARRHEA: 0
FREQUENCY: 0
ARTHRALGIAS: 0
NAUSEA: 0
VOMITING: 0
HEMATURIA: 0
SORE THROAT: 0
DYSURIA: 0
SHORTNESS OF BREATH: 0
FEVER: 0
CHILLS: 0
COUGH: 0
MYALGIAS: 0
PALPITATIONS: 0
HEADACHES: 0
FATIGUE: 0

## 2023-10-17 NOTE — PATIENT INSTRUCTIONS
Wellness visit for 45 min the first week of Feb 2024  ( CX[facility] Jan appointment as it is too soon)

## 2023-10-17 NOTE — ASSESSMENT & PLAN NOTE
Patient has anxiety but only uses Xanax when needed when she feels anxious has developed public or out with family which is only once or twice a week.  Due to her limited uses a controlled substance agreement was determined to be not necessary.  At this time we will give the patient 90 pills in an effort to make it last an entire year.  Patient and daughter were present agree and understand

## 2023-10-17 NOTE — PROGRESS NOTES
Subjective   Patient ID: Gris Salcido is a 89 y.o. female who presents for No chief complaint on file..  Patient is here to discuss her depression or anxiety.  Patient has been having hot flashes and does not like the sertraline and would like to just use the Xanax as needed.  Upon reviewing her usage she was given 30 pills and it lasted approximately 6 months so she is really using a very low quantity.  At this point I am willing to say it is acute usage and give her a 90-day prescription and see if it can last for an entire year.  I do not believe a controlled substance agreement is warranted or necessary in this patient.  If she should change her usage increase its frequency or quantity we would then consider a controlled substance agreement again.        Review of Systems   Constitutional:  Negative for chills, fatigue and fever.   HENT:  Negative for sore throat.    Eyes:  Negative for visual disturbance.   Respiratory:  Negative for cough and shortness of breath.    Cardiovascular:  Negative for chest pain, palpitations and leg swelling.   Gastrointestinal:  Negative for constipation, diarrhea, nausea and vomiting.   Genitourinary:  Negative for difficulty urinating, dysuria, frequency, hematuria and urgency.   Musculoskeletal:  Negative for arthralgias and myalgias.   Skin:  Negative for rash.   Neurological:  Negative for dizziness, syncope, weakness, light-headedness and headaches.   Psychiatric/Behavioral:  The patient is nervous/anxious.        Objective   Medication Documentation Review Audit       Reviewed by Noa Coelho MD (Physician) on 09/26/23 at 1345      Medication Order Taking? Sig Documenting Provider Last Dose Status   acetaminophen (TylenoL) 325 mg tablet [id number]  Tylenol 325 MG Oral Tablet   Refills: 0       Active Historical Provider, MD  Active   ALPRAZolam (Xanax) 0.25 mg tablet [id number]  Take 1 tablet (0.25 mg) by mouth once daily as needed for anxiety (once daily as needed for  anxiety). Noa Coelho MD  Active   amLODIPine (Norvasc) 5 mg tablet [id number]  Take 1 tablet (5 mg) by mouth 2 times a day. Noa Coelho MD  Active   amoxicillin (Amoxil) 500 mg capsule [id number]  TAKE FOUR CAPSULES BY MOUTH ONE HOUR BEFORE APPOINTMENT Historical Provider, MD  Active   atorvastatin (Lipitor) 10 mg tablet [id number] No Take 0.5 tablets (5 mg) by mouth once daily. Historical Provider, MD Taking Active   bacitracin-polymyxin B (Polysporin) ophthalmic ointment [id number]  1 Application 4 times a day. Historical Provider, MD  Active   benazepril (Lotensin) 40 mg tablet [id number]  TAKE 1 TABLET BY MOUTH TWICE  DAILY Noa Coelho MD  Active   calcium carbonate 600 mg calcium (1,500 mg) tablet [id number] No Take 1 tablet (600 mg) by mouth in the morning and 1 tablet (600 mg) in the evening and 1 tablet (600 mg) before bedtime. Historical Provider, MD Taking Active   cholecalciferol (Vitamin D-3) 25 MCG (1000 UT) capsule [id number]  TAKE 1 CAPSULE M, W,Fri, Sat Historical Provider, MD  Active   clobetasol (Temovate) 0.05 % cream [id number]  Apply cream topically to affected areas on the trunk & extremities twice daily until clear, then as needed for flares cover with thick moisturizer, avoid groin, face Historical Provider, MD  Active   estradiol (Estrace) 0.01 % (0.1 mg/gram) vaginal cream [id number]   Historical Provider, MD  Active   famotidine (Pepcid) 20 mg tablet [id number] No Take 1 tablet (20 mg) by mouth in the morning and 1 tablet (20 mg) before bedtime. Historical Provider, MD Taking Active   fluticasone (Flonase) 50 mcg/actuation nasal spray [id number]  USE 1 SPRAY IN BOTH NOSTRILS  ONCE DAILY Noa Coelho MD  Active   furosemide (Lasix) 20 mg tablet [id number]  Take 0.5 tablets (10 mg) by mouth once daily. oNa Coelho MD  Active   ketoconazole (NIZOral) 2 % shampoo [id number]  WASH SCALP DAILY UNTIL CLEAR. THEN USE 2-3 TIMES A WEEK FOR MAINTENANCE, LET SIT FOR 3-5 MINUTES THEN RINSE Historical  Provider, MD  Active   loratadine (Claritin) 10 mg tablet [id number]  Take 1 tablet (10 mg) by mouth once daily. Noa Coelho MD   23   mometasone (Elocon) 0.1 % cream [id number]  APPLY CREAM TOPICALLY TWICE DAILY TO AFFECTED AREA ON LEG UNTIL RESOLVED, TAPER AS DIRECTED Historical Provider, MD  Active   nebivolol (Bystolic) 10 mg tablet [id number] No Take 1 tablet (10 mg) by mouth once daily. Historical Provider, MD Taking Active   neomycin-polymyxin-dexAMETHasone (Maxitrol) 3.5mg/mL-10,000 unit/mL-0.1 % ophthalmic suspension [id number]   Historical Provider, MD  Active   nitroglycerin (Nitrostat) 0.4 mg SL tablet [id number]  Place 1 tablet (0.4 mg) under the tongue every 5 minutes if needed for chest pain. For up to 3 doses. Call 911 if pain persists. Historical Provider, MD  Active   oxyquinoline-sod.lauryl sulfat (Trimo-Stovall Jelly) 0.025-0.01 % gel [id number]  Insert into the vagina. Historical Provider, MD  Active   potassium chloride CR (Klor-Con M20) 20 mEq ER tablet [id number]  Take a tab Monday-Wednesday and Friday Historical Provider, MD  Active   Premarin vaginal cream [id number]  USE 1G VAGINALLY ONCE DAILY AT BEDTIME AS DIRECTED Historical Provider, MD  Active   psyllium (Metamucil) powder [id number]  Take by mouth once daily. Historical Provider, MD  Active   psyllium husk, with sugar, (Metamucil, with sugar,) 3.4 gram/12 gram powder [id number]  Take by mouth. Historical Provider, MD  Active   sertraline (Zoloft) 25 mg tablet [id number]  Take 1 tablet (25 mg) by mouth once daily. Noa Coelho MD  Active                  Allergies   Allergen Reactions    Erythromycin Diarrhea and Shortness of breath    Amlodipine Other     Ankle edema in higher doses    Cephalexin Unknown and Other     sore mouth    Hydralazine Unknown     Chest pain    Hydrochlorothiazide Unknown    Lisinopril-Hydrochlorothiazide Other    Nifedipine Unknown    Streptomycin Unknown    Tetracyclines Unknown    Valsartan Unknown      Physical Exam  Constitutional:       Appearance: Normal appearance.   HENT:      Head: Normocephalic and atraumatic.      Nose: Nose normal.   Eyes:      Extraocular Movements: Extraocular movements intact.      Pupils: Pupils are equal, round, and reactive to light.   Cardiovascular:      Rate and Rhythm: Normal rate and regular rhythm.   Pulmonary:      Breath sounds: Normal breath sounds.   Abdominal:      General: Abdomen is flat. Bowel sounds are normal.      Palpations: Abdomen is soft.   Musculoskeletal:      Right lower leg: No edema.      Left lower leg: No edema.   Neurological:      Mental Status: She is alert.     /60 (BP Location: Right arm, Patient Position: Sitting, BP Cuff Size: Small adult)   Pulse 74   Wt 54.2 kg (119 lb 6.4 oz)   SpO2 97%   BMI 22.20 kg/m²       Assessment/Plan   Problem List Items Addressed This Visit       Anxiety state     Patient has anxiety but only uses Xanax when needed when she feels anxious has developed public or out with family which is only once or twice a week.  Due to her limited uses a controlled substance agreement was determined to be not necessary.  At this time we will give the patient 90 pills in an effort to make it last an entire year.  Patient and daughter were present agree and understand         Relevant Medications    ALPRAZolam (Xanax) 0.25 mg tablet    Hyperlipidemia - Primary     Patient needs a refill of atorvastatin which was sent to [facility].         Relevant Medications    atorvastatin (Lipitor) 10 mg tablet              It has been a pleasure seeing you.  Noa Coelho MD

## 2023-12-12 ENCOUNTER — OFFICE VISIT (OUTPATIENT)
Dept: PODIATRY | Facility: CLINIC | Age: 88
End: 2023-12-12
Payer: MEDICARE

## 2023-12-12 VITALS — DIASTOLIC BLOOD PRESSURE: 59 MMHG | TEMPERATURE: 97.6 F | HEART RATE: 71 BPM | SYSTOLIC BLOOD PRESSURE: 132 MMHG

## 2023-12-12 DIAGNOSIS — M79.671 RIGHT FOOT PAIN: ICD-10-CM

## 2023-12-12 DIAGNOSIS — M79.675 TOE PAIN, LEFT: ICD-10-CM

## 2023-12-12 DIAGNOSIS — M79.672 LEFT FOOT PAIN: ICD-10-CM

## 2023-12-12 DIAGNOSIS — B35.1 TINEA UNGUIUM: ICD-10-CM

## 2023-12-12 DIAGNOSIS — M79.674 TOE PAIN, RIGHT: ICD-10-CM

## 2023-12-12 DIAGNOSIS — L84 CORNS AND CALLOSITIES: Primary | ICD-10-CM

## 2023-12-12 PROCEDURE — 3075F SYST BP GE 130 - 139MM HG: CPT | Performed by: PODIATRIST

## 2023-12-12 PROCEDURE — 11721 DEBRIDE NAIL 6 OR MORE: CPT | Performed by: PODIATRIST

## 2023-12-12 PROCEDURE — 3078F DIAST BP <80 MM HG: CPT | Performed by: PODIATRIST

## 2023-12-12 PROCEDURE — 11056 PARNG/CUTG B9 HYPRKR LES 2-4: CPT | Performed by: PODIATRIST

## 2023-12-12 PROCEDURE — 1126F AMNT PAIN NOTED NONE PRSNT: CPT | Performed by: PODIATRIST

## 2023-12-12 PROCEDURE — 1160F RVW MEDS BY RX/DR IN RCRD: CPT | Performed by: PODIATRIST

## 2023-12-12 PROCEDURE — 1159F MED LIST DOCD IN RCRD: CPT | Performed by: PODIATRIST

## 2023-12-12 PROCEDURE — 1036F TOBACCO NON-USER: CPT | Performed by: PODIATRIST

## 2023-12-12 NOTE — PROGRESS NOTES
CC: painful thickened and elongated toenails    HPI:  Pt presents complaining painful thickened and elongated toenails that are difficult to manage.  Onset was gradual with worsening course until recently.  Aggravated by shoe gear and ambulation.       PCP: Dr. Coelho  Last visit: 10-17-23     PMH  Past Medical History:   Diagnosis Date    Abrasion, left lower leg, initial encounter 07/24/2021    Abrasion, left lower leg, initial encounter    Acute maxillary sinusitis, unspecified 02/12/2015    Acute maxillary sinusitis    Acute upper respiratory infection, unspecified 02/25/2013    Acute upper respiratory infection    Cystocele, unspecified (CODE) 09/25/2018    Vaginal wall prolapse    Dizziness and giddiness 10/12/2020    Light headed    Encounter for follow-up examination after completed treatment for conditions other than malignant neoplasm 10/12/2020    Hospital discharge follow-up    Encounter for follow-up examination after completed treatment for conditions other than malignant neoplasm 02/21/2017    Hospital discharge follow-up    Encounter for follow-up examination after completed treatment for conditions other than malignant neoplasm 03/25/2021    Hospital discharge follow-up    Encounter for follow-up examination after completed treatment for conditions other than malignant neoplasm 10/12/2020    Hospital discharge follow-up    Encounter for immunization 09/28/2012    Need for prophylactic vaccination and inoculation against influenza    Major depressive disorder, single episode, moderate (CMS/HCC) 04/19/2021    Current moderate episode of major depressive disorder    Menopausal and female climacteric states 05/05/2020    Postmenopausal syndrome    Other conditions influencing health status     Basal Cell Carcinoma Of The Eyelid    Other conditions influencing health status     History of cough    Other conditions influencing health status 12/07/2021    History of cough    Other conditions influencing  health status 02/11/2021    History of cough    Other conditions influencing health status 07/02/2013    Coronary Artery Disease    Other displaced fracture of upper end of left humerus, initial encounter for closed fracture 03/30/2021    Fracture of humeral head, left, closed    Other general symptoms and signs 12/21/2020    Cold intolerance    Other specified abnormal findings of blood chemistry 03/13/2022    Abnormal thyroid blood test    Other specified diseases of liver 10/05/2015    Liver cyst    Palpitations 04/27/2021    Intermittent palpitations    Paresthesia of skin 10/12/2020    Paresthesia of finger    Personal history of other (healed) physical injury and trauma 09/28/2012    History of strain of back    Personal history of other diseases of the digestive system 11/18/2015    History of gastroenteritis    Personal history of other diseases of the respiratory system 05/21/2016    History of sinusitis    Personal history of other drug therapy 09/19/2017    History of influenza vaccination    Personal history of other endocrine, nutritional and metabolic disease 11/18/2015    History of dehydration    Personal history of other infectious and parasitic diseases 01/18/2017    History of viral infection    Personal history of other infectious and parasitic diseases 12/01/2021    History of viral infection    Personal history of other specified conditions 03/09/2021    History of tachycardia    Personal history of other specified conditions 03/09/2021    History of fatigue    Personal history of other specified conditions 05/05/2020    History of insomnia    Personal history of other specified conditions 05/13/2020    History of nausea and vomiting    Personal history of other specified conditions 08/09/2021    History of palpitations    Pyuria 04/04/2021    Pyuria    Qualitative platelet defects (CMS/HCC)     Thrombocytopathy    Wedge compression fracture of unspecified thoracic vertebra, initial encounter  for closed fracture (CMS/[facility]) 05/05/2020    Compression fracture of thoracic vertebra    Wedge compression fracture of unspecified thoracic vertebra, initial encounter for closed fracture (CMS/[facility]) 07/02/2013    Compression fracture of thoracic vertebra     MEDS    Current Outpatient Medications:     acetaminophen (TylenoL) 325 mg tablet, Tylenol 325 MG Oral Tablet  Refills: 0     Active, Disp: , Rfl:     ALPRAZolam (Xanax) 0.25 mg tablet, Take 1 tablet (0.25 mg) by mouth once daily as needed for anxiety (once daily as needed for anxiety)., Disp: 90 tablet, Rfl: 0    amLODIPine (Norvasc) 5 mg tablet, Take 1 tablet (5 mg) by mouth 2 times a day., Disp: 180 tablet, Rfl: 3    amoxicillin (Amoxil) 500 mg capsule, TAKE FOUR CAPSULES BY MOUTH ONE HOUR BEFORE APPOINTMENT, Disp: , Rfl:     atorvastatin (Lipitor) 10 mg tablet, Take 0.5 tablets (5 mg) by mouth once daily., Disp: 90 tablet, Rfl: 3    bacitracin-polymyxin B (Polysporin) ophthalmic ointment, 1 Application 4 times a day., Disp: , Rfl:     benazepril (Lotensin) 40 mg tablet, TAKE 1 TABLET BY MOUTH TWICE  DAILY, Disp: 180 tablet, Rfl: 0    calcium carbonate 600 mg calcium (1,500 mg) tablet, Take 1 tablet (600 mg) by mouth in the morning and 1 tablet (600 mg) in the evening and 1 tablet (600 mg) before bedtime., Disp: , Rfl:     cholecalciferol (Vitamin D-3) 25 MCG (1000 UT) capsule, TAKE 1 CAPSULE M, W,Fri, Sat, Disp: , Rfl:     clobetasol (Temovate) 0.05 % cream, Apply cream topically to affected areas on the trunk & extremities twice daily until clear, then as needed for flares cover with thick moisturizer, avoid groin, face, Disp: , Rfl:     estradiol (Estrace) 0.01 % (0.1 mg/gram) vaginal cream, , Disp: , Rfl:     famotidine (Pepcid) 20 mg tablet, Take 1 tablet (20 mg) by mouth in the morning and 1 tablet (20 mg) before bedtime., Disp: , Rfl:     fluticasone (Flonase) 50 mcg/actuation nasal spray, USE 1 SPRAY IN BOTH NOSTRILS  ONCE DAILY, Disp: 16 g, Rfl: 0     furosemide (Lasix) 20 mg tablet, Take 0.5 tablets (10 mg) by mouth once daily., Disp: 90 tablet, Rfl: 3    ketoconazole (NIZOral) 2 % shampoo, WASH SCALP DAILY UNTIL CLEAR. THEN USE 2-3 TIMES A WEEK FOR MAINTENANCE, LET SIT FOR 3-5 MINUTES THEN RINSE, Disp: , Rfl:     loratadine (Claritin) 10 mg tablet, Take 1 tablet (10 mg) by mouth once daily., Disp: 30 tablet, Rfl: 2    mometasone (Elocon) 0.1 % cream, APPLY CREAM TOPICALLY TWICE DAILY TO AFFECTED AREA ON LEG UNTIL RESOLVED, TAPER AS DIRECTED, Disp: , Rfl:     nebivolol (Bystolic) 10 mg tablet, Take 1 tablet (10 mg) by mouth once daily., Disp: , Rfl:     neomycin-polymyxin-dexAMETHasone (Maxitrol) 3.5mg/mL-10,000 unit/mL-0.1 % ophthalmic suspension, , Disp: , Rfl:     nitroglycerin (Nitrostat) 0.4 mg SL tablet, Place 1 tablet (0.4 mg) under the tongue every 5 minutes if needed for chest pain. For up to 3 doses. Call 911 if pain persists., Disp: , Rfl:     oxyquinoline-sod.lauryl sulfat (Trimo-Stovall Jelly) 0.025-0.01 % gel, Insert into the vagina., Disp: , Rfl:     potassium chloride CR 20 mEq ER tablet, TAKE 1 TABLET BY MOUTH MONDAY  THROUGH WEDNESDAY AND FRIDAY, Disp: 36 tablet, Rfl: 0    Premarin vaginal cream, USE 1G VAGINALLY ONCE DAILY AT BEDTIME AS DIRECTED, Disp: , Rfl:     psyllium (Metamucil) powder, Take by mouth once daily., Disp: , Rfl:     psyllium husk, with sugar, (Metamucil, with sugar,) 3.4 gram/12 gram powder, Take by mouth., Disp: , Rfl:     sertraline (Zoloft) 25 mg tablet, Take 1 tablet (25 mg) by mouth once daily., Disp: 30 tablet, Rfl: 5  Allergies  Allergies   Allergen Reactions    Erythromycin Diarrhea and Shortness of breath    Amlodipine Other     Ankle edema in higher doses    Cephalexin Unknown and Other     sore mouth    Hydralazine Unknown     Chest pain    Hydrochlorothiazide Unknown    Lisinopril-Hydrochlorothiazide Other    Nifedipine Unknown    Streptomycin Unknown    Tetracyclines Unknown    Valsartan Unknown     Social History      Socioeconomic History    Marital status:      Spouse name: None    Number of children: None    Years of education: None    Highest education level: None   Occupational History    None   Tobacco Use    Smoking status: Former     Packs/day: 0.50     Years: 10.00     Additional pack years: 0.00     Total pack years: 5.00     Types: Cigarettes     Start date:      Quit date:      Years since quittin.9     Passive exposure: Never    Smokeless tobacco: Never   Substance and Sexual Activity    Alcohol use: Yes     Comment: once in a while    Drug use: Never    Sexual activity: None   Other Topics Concern    None   Social History Narrative    None     Social Determinants of Health     Financial Resource Strain: Not on file   Food Insecurity: Not on file   Transportation Needs: Not on file   Physical Activity: Not on file   Stress: Not on file   Social Connections: Not on file   Intimate Partner Violence: Not on file   Housing Stability: Not on file     Family History   Problem Relation Name Age of Onset    Coronary artery disease Mother      Hypertension Mother      Coronary artery disease Father       Past Surgical History:   Procedure Laterality Date    BREAST LUMPECTOMY  2012    Breast Surgery Lumpectomy    HERNIA REPAIR  2012    Hernia Repair    OTHER SURGICAL HISTORY  2012    Ovarian Cystectomy    OTHER SURGICAL HISTORY  2012    Reported Hx Of Eye Surgery For Cataracts    OTHER SURGICAL HISTORY  2014    Vaginal Surgery    OTHER SURGICAL HISTORY  2014    Vaginal Surgery    TOTAL ABDOMINAL HYSTERECTOMY  2012    Total Abdominal Hysterectomy       REVIEW OF SYSTEMS    DERM:   + as noted in HPI.       Physical examination:   On General Observation: Patient is a pleasant, cooperative, well developed 89 y.o.  adult female. The patient is alert and oriented to time, place and person. Patient has normal affect and mood.  /59   Pulse 71   Temp 36.4 °C  (97.6 °F)     Vascular:  DP and PT pulses are 1-2/4 b/l.  mild edema noted. mild varicosities b/l.  CFT  6 seconds to all digits bilateral.  Skin temperature is warm to warm from proximal to distal bilateral.      Muscular: Strength is 5/5 for all instrinsic and extrinsic muscle groups.     Neuro:  Proprioception present.   Sensation to vibration is  present. Protective sensation intact  at all pedal sites via [place] Bebe 5.07 monofilament bilateral.  Light touch present bilateral.     Derm:    Callus is present plantar mpj's b/l le  Decreased hair growth b/l le  Left toenails: 1-5 Brittleness, crumbling upon debridement, subungual debris, elongation, mycotic appearance, tenderness, and thickness.   Right toenails: 1-5 Brittleness, crumbling upon debridement, subungual debris, elongation, mycotic appearance, tenderness, and thickness.       ASSESSMENT:    Tinea Unguium [B35.1]   Pain in right toe(s) [M79.674]   Pain in left toe(s) [M79.675]       PLAN:   -Debrided callus tissue plantar feet with sharp debridement    - Debrided toenails 1-10 in length and height.   - Follow up in 9-12 weeks.       Bethel Spencer DPM

## 2024-01-15 PROBLEM — I35.1 AORTIC VALVE REGURGITATION, ACQUIRED: Status: RESOLVED | Noted: 2023-08-29 | Resolved: 2024-01-15

## 2024-01-15 PROBLEM — E78.00 HYPERCHOLESTEROLEMIA: Status: RESOLVED | Noted: 2023-05-18 | Resolved: 2024-01-15

## 2024-01-22 ENCOUNTER — HOSPITAL ENCOUNTER (OUTPATIENT)
Dept: CARDIOLOGY | Facility: CLINIC | Age: 89
Discharge: HOME | End: 2024-01-22
Payer: MEDICARE

## 2024-01-22 ENCOUNTER — OFFICE VISIT (OUTPATIENT)
Dept: CARDIOLOGY | Facility: CLINIC | Age: 89
End: 2024-01-22
Payer: MEDICARE

## 2024-01-22 VITALS
HEIGHT: 61 IN | BODY MASS INDEX: 22.47 KG/M2 | SYSTOLIC BLOOD PRESSURE: 154 MMHG | WEIGHT: 119 LBS | DIASTOLIC BLOOD PRESSURE: 70 MMHG | OXYGEN SATURATION: 98 % | HEART RATE: 65 BPM

## 2024-01-22 DIAGNOSIS — R00.2 PALPITATIONS: Primary | ICD-10-CM

## 2024-01-22 DIAGNOSIS — I47.19 ATRIAL TACHYCARDIA (CMS-HCC): ICD-10-CM

## 2024-01-22 DIAGNOSIS — R00.2 PALPITATIONS: ICD-10-CM

## 2024-01-22 PROCEDURE — 1036F TOBACCO NON-USER: CPT | Performed by: INTERNAL MEDICINE

## 2024-01-22 PROCEDURE — 99213 OFFICE O/P EST LOW 20 MIN: CPT | Performed by: INTERNAL MEDICINE

## 2024-01-22 PROCEDURE — 1126F AMNT PAIN NOTED NONE PRSNT: CPT | Performed by: INTERNAL MEDICINE

## 2024-01-22 PROCEDURE — 93244 EXT ECG>48HR<7D REV&INTERPJ: CPT | Performed by: INTERNAL MEDICINE

## 2024-01-22 PROCEDURE — 3077F SYST BP >= 140 MM HG: CPT | Performed by: INTERNAL MEDICINE

## 2024-01-22 PROCEDURE — 1159F MED LIST DOCD IN RCRD: CPT | Performed by: INTERNAL MEDICINE

## 2024-01-22 PROCEDURE — 93242 EXT ECG>48HR<7D RECORDING: CPT

## 2024-01-22 PROCEDURE — 1160F RVW MEDS BY RX/DR IN RCRD: CPT | Performed by: INTERNAL MEDICINE

## 2024-01-22 PROCEDURE — 93005 ELECTROCARDIOGRAM TRACING: CPT | Mod: 59 | Performed by: INTERNAL MEDICINE

## 2024-01-22 PROCEDURE — 3078F DIAST BP <80 MM HG: CPT | Performed by: INTERNAL MEDICINE

## 2024-01-22 PROCEDURE — 93010 ELECTROCARDIOGRAM REPORT: CPT | Performed by: INTERNAL MEDICINE

## 2024-01-22 NOTE — PROGRESS NOTES
"History Of Present Illness:      This is a 89-year-old female with history of atrial tachycardia and hypertension.  She reports having irregular heartbeat over the past few weeks and occasionally has palpitations.  No chest pain, shortness of breath, dizziness, or syncope.    Review of Systems  Other review of systems negative     Last Recorded Vitals:      8/2/2023     1:29 PM 8/29/2023     1:24 PM 8/29/2023     4:16 PM 9/26/2023     1:24 PM 10/17/2023     2:15 PM 12/12/2023    12:56 PM 1/22/2024     2:05 PM   Vitals   Systolic 174 144 136 143 128 132 154   Diastolic 54 50 50 56 60 59 70   Heart Rate  69  67 74 71 65   Temp      36.4 °C (97.6 °F)    Height (in)  1.568 m (5' 1.75\")  1.562 m (5' 1.5\")   1.549 m (5' 1\")   Weight (lb)  119.2  118.2 119.4  119   BMI  21.98 kg/m2  21.97 kg/m2 22.2 kg/m2  22.48 kg/m2   BSA (m2)  1.54 m2  1.53 m2 1.53 m2  1.52 m2   Visit Report  Report Report Report Report Report Report          Allergies:  Erythromycin, Amlodipine, Cephalexin, Hydralazine, Hydrochlorothiazide, Lisinopril-hydrochlorothiazide, Nifedipine, Streptomycin, Tetracyclines, and Valsartan    Outpatient Medications:  Current Outpatient Medications   Medication Instructions    acetaminophen (TylenoL) 325 mg tablet Tylenol 325 MG Oral Tablet   Refills: 0       Active    ALPRAZolam (XANAX) 0.25 mg, oral, Daily PRN    amLODIPine (NORVASC) 5 mg, oral, 2 times daily    amoxicillin (Amoxil) 500 mg capsule TAKE FOUR CAPSULES BY MOUTH ONE HOUR BEFORE APPOINTMENT    atorvastatin (LIPITOR) 5 mg, oral, Daily    bacitracin-polymyxin B (Polysporin) ophthalmic ointment 1 Application, 4 times daily    benazepril (LOTENSIN) 40 mg, oral, 2 times daily    calcium carbonate 600 mg calcium (1,500 mg) tablet 1 tablet, oral, 3 times daily    cholecalciferol (Vitamin D-3) 25 MCG (1000 UT) capsule TAKE 1 CAPSULE M, W,Fri, Sat    clobetasol (Temovate) 0.05 % cream Apply cream topically to affected areas on the trunk & extremities twice daily " until clear, then as needed for flares cover with thick moisturizer, avoid groin, face    estradiol (Estrace) 0.01 % (0.1 mg/gram) vaginal cream     famotidine (Pepcid) 20 mg tablet 1 tablet, oral, 2 times daily    fluticasone (Flonase) 50 mcg/actuation nasal spray 1 spray, Each Nostril, Daily    furosemide (LASIX) 10 mg, oral, Daily    ketoconazole (NIZOral) 2 % shampoo WASH SCALP DAILY UNTIL CLEAR. THEN USE 2-3 TIMES A WEEK FOR MAINTENANCE, LET SIT FOR 3-5 MINUTES THEN RINSE    loratadine (CLARITIN) 10 mg, oral, Daily    mometasone (Elocon) 0.1 % cream APPLY CREAM TOPICALLY TWICE DAILY TO AFFECTED AREA ON LEG UNTIL RESOLVED, TAPER AS DIRECTED    nebivolol (Bystolic) 10 mg tablet 1 tablet, oral, Daily    neomycin-polymyxin-dexAMETHasone (Maxitrol) 3.5mg/mL-10,000 unit/mL-0.1 % ophthalmic suspension     nitroglycerin (NITROSTAT) 0.4 mg, sublingual, Every 5 min PRN, For up to 3 doses. Call 911 if pain persists.    oxyquinoline-sod.lauryl sulfat (Trimo-Stovall Jelly) 0.025-0.01 % gel vaginal    potassium chloride CR 20 mEq ER tablet TAKE 1 TABLET BY MOUTH MONDAY  THROUGH WEDNESDAY AND FRIDAY    Premarin vaginal cream USE 1G VAGINALLY ONCE DAILY AT BEDTIME AS DIRECTED    psyllium (Metamucil) powder oral, Daily RT    psyllium husk, with sugar, (Metamucil, with sugar,) 3.4 gram/12 gram powder oral    sertraline (ZOLOFT) 25 mg, oral, Daily       Physical Exam:    General Appearance:  Alert, oriented, no distress  Skin:  Warm and dry  Head and Neck:  No elevation of JVP, no carotid bruits  Cardiac Exam:  Rhythm is regular, S1 and S2 are normal, no murmur S3 or S4  Lungs:  Clear to auscultation  Extremities:  no edema  Neurologic:  No focal deficits  Psychiatric:  Appropriate mood and behavior         Cardiology Tests:  I have personally review the diagnostic cardiac testing and my interpretation is as follows:    EKG: Sinus rhythm, right bundle branch block      Assessment/Plan   Problem List Items Addressed This Visit              ICD-10-CM    Atrial tachycardia I47.19    Relevant Orders    Holter or Event Cardiac Monitor    Palpitations - Primary R00.2     1.  Palpitations: The patient has a history of atrial tachycardia and has noticed an increase in palpitations over the past few weeks.  An extended Holter monitor is recommended to reassess the arrhythmia burden.  She will remain on the same dose of nebivolol at this time.  Follow-up after Holter monitor is completed and further treatment recommendations will be made at that time.         Relevant Orders    ECG 12 lead (Clinic Performed)    Holter or Event Cardiac Monitor       James C Ramicone, DO

## 2024-01-22 NOTE — ASSESSMENT & PLAN NOTE
1.  Palpitations: The patient has a history of atrial tachycardia and has noticed an increase in palpitations over the past few weeks.  An extended Holter monitor is recommended to reassess the arrhythmia burden.  She will remain on the same dose of nebivolol at this time.  Follow-up after Holter monitor is completed and further treatment recommendations will be made at that time.

## 2024-01-27 LAB
ATRIAL RATE: 64 BPM
P AXIS: 102 DEGREES
P OFFSET: 153 MS
P ONSET: 117 MS
PR INTERVAL: 212 MS
Q ONSET: 223 MS
QRS COUNT: 11 BEATS
QRS DURATION: 130 MS
QT INTERVAL: 428 MS
QTC CALCULATION(BAZETT): 441 MS
QTC FREDERICIA: 437 MS
R AXIS: 2 DEGREES
T AXIS: 23 DEGREES
T OFFSET: 437 MS
VENTRICULAR RATE: 64 BPM

## 2024-02-05 LAB — BODY SURFACE AREA: 1.52 M2

## 2024-02-13 ENCOUNTER — APPOINTMENT (OUTPATIENT)
Dept: CARDIOLOGY | Facility: CLINIC | Age: 89
End: 2024-02-13
Payer: MEDICARE

## 2024-02-13 DIAGNOSIS — I47.19 ATRIAL TACHYCARDIA (CMS-HCC): Primary | ICD-10-CM

## 2024-02-13 RX ORDER — NEBIVOLOL 5 MG/1
5 TABLET ORAL DAILY
Qty: 90 TABLET | Refills: 3 | Status: SHIPPED | OUTPATIENT
Start: 2024-02-13 | End: 2024-05-16 | Stop reason: SDUPTHER

## 2024-02-22 ENCOUNTER — APPOINTMENT (OUTPATIENT)
Dept: CARDIOLOGY | Facility: CLINIC | Age: 89
End: 2024-02-22
Payer: MEDICARE

## 2024-03-07 ENCOUNTER — PATIENT OUTREACH (OUTPATIENT)
Dept: PRIMARY CARE | Facility: CLINIC | Age: 89
End: 2024-03-07
Payer: MEDICARE

## 2024-03-07 ENCOUNTER — TELEPHONE (OUTPATIENT)
Dept: PRIMARY CARE | Facility: CLINIC | Age: 89
End: 2024-03-07
Payer: MEDICARE

## 2024-03-07 NOTE — PROGRESS NOTES
Discharge Facility: [facility]  Discharge Diagnosis: suspected UTI  Admission Date: 3/4/24  Discharge Date:  3/6/24    PCP Appointment Date: 3/12/24  Specialist Appointment Date:   - Cardiology: 8-10 days  Hospital Encounter and Summary: Linked     TCM call completed, however was unable to reach patient during two business days after discharge despite at least two attempts made.

## 2024-03-07 NOTE — TELEPHONE ENCOUNTER
Cristela from [facility] called and wanted to see if Dr. RAMIREZ would follow pt for home care, nursing, PT and OT.   Confidential Voicemail- [phone] opt* [phone]

## 2024-03-07 NOTE — TELEPHONE ENCOUNTER
Patient notified and read message. Patient also confirm March 21 st appointment.    Cristela at [facility] was notified by leaving a message on an identified voicemail.    DERECK

## 2024-03-12 ENCOUNTER — OFFICE VISIT (OUTPATIENT)
Dept: PRIMARY CARE | Facility: CLINIC | Age: 89
End: 2024-03-12
Payer: MEDICARE

## 2024-03-12 ENCOUNTER — OFFICE VISIT (OUTPATIENT)
Dept: PODIATRY | Facility: CLINIC | Age: 89
End: 2024-03-12
Payer: MEDICARE

## 2024-03-12 VITALS
BODY MASS INDEX: 22.41 KG/M2 | DIASTOLIC BLOOD PRESSURE: 60 MMHG | OXYGEN SATURATION: 96 % | HEART RATE: 77 BPM | SYSTOLIC BLOOD PRESSURE: 142 MMHG | WEIGHT: 121.8 LBS | HEIGHT: 62 IN

## 2024-03-12 VITALS — DIASTOLIC BLOOD PRESSURE: 56 MMHG | HEART RATE: 80 BPM | TEMPERATURE: 98.1 F | SYSTOLIC BLOOD PRESSURE: 128 MMHG

## 2024-03-12 DIAGNOSIS — Z09 HOSPITAL DISCHARGE FOLLOW-UP: ICD-10-CM

## 2024-03-12 DIAGNOSIS — K59.09 CONSTIPATION, CHRONIC: ICD-10-CM

## 2024-03-12 DIAGNOSIS — M79.672 LEFT FOOT PAIN: ICD-10-CM

## 2024-03-12 DIAGNOSIS — E86.0 DEHYDRATION: ICD-10-CM

## 2024-03-12 DIAGNOSIS — R93.89 ABNORMAL CHEST X-RAY: Primary | ICD-10-CM

## 2024-03-12 DIAGNOSIS — M79.675 TOE PAIN, LEFT: ICD-10-CM

## 2024-03-12 DIAGNOSIS — M79.674 TOE PAIN, RIGHT: ICD-10-CM

## 2024-03-12 DIAGNOSIS — M79.671 RIGHT FOOT PAIN: ICD-10-CM

## 2024-03-12 DIAGNOSIS — B35.1 TINEA UNGUIUM: ICD-10-CM

## 2024-03-12 DIAGNOSIS — L84 CORNS AND CALLOSITIES: Primary | ICD-10-CM

## 2024-03-12 DIAGNOSIS — I10 PRIMARY HYPERTENSION: ICD-10-CM

## 2024-03-12 DIAGNOSIS — N30.00 ACUTE CYSTITIS WITHOUT HEMATURIA: ICD-10-CM

## 2024-03-12 PROCEDURE — 99496 TRANSJ CARE MGMT HIGH F2F 7D: CPT | Performed by: INTERNAL MEDICINE

## 2024-03-12 PROCEDURE — 1157F ADVNC CARE PLAN IN RCRD: CPT | Performed by: PODIATRIST

## 2024-03-12 PROCEDURE — 1157F ADVNC CARE PLAN IN RCRD: CPT | Performed by: INTERNAL MEDICINE

## 2024-03-12 PROCEDURE — 1036F TOBACCO NON-USER: CPT | Performed by: INTERNAL MEDICINE

## 2024-03-12 PROCEDURE — 11721 DEBRIDE NAIL 6 OR MORE: CPT | Performed by: PODIATRIST

## 2024-03-12 PROCEDURE — 3078F DIAST BP <80 MM HG: CPT | Performed by: INTERNAL MEDICINE

## 2024-03-12 PROCEDURE — 3077F SYST BP >= 140 MM HG: CPT | Performed by: INTERNAL MEDICINE

## 2024-03-12 PROCEDURE — 1159F MED LIST DOCD IN RCRD: CPT | Performed by: INTERNAL MEDICINE

## 2024-03-12 PROCEDURE — 1126F AMNT PAIN NOTED NONE PRSNT: CPT | Performed by: INTERNAL MEDICINE

## 2024-03-12 PROCEDURE — 11056 PARNG/CUTG B9 HYPRKR LES 2-4: CPT | Performed by: PODIATRIST

## 2024-03-12 PROCEDURE — 1036F TOBACCO NON-USER: CPT | Performed by: PODIATRIST

## 2024-03-12 PROCEDURE — 3078F DIAST BP <80 MM HG: CPT | Performed by: PODIATRIST

## 2024-03-12 PROCEDURE — 1159F MED LIST DOCD IN RCRD: CPT | Performed by: PODIATRIST

## 2024-03-12 PROCEDURE — 3074F SYST BP LT 130 MM HG: CPT | Performed by: PODIATRIST

## 2024-03-12 PROCEDURE — 1126F AMNT PAIN NOTED NONE PRSNT: CPT | Performed by: PODIATRIST

## 2024-03-12 ASSESSMENT — ENCOUNTER SYMPTOMS
NAUSEA: 0
PALPITATIONS: 0
WEAKNESS: 0
COUGH: 0
SORE THROAT: 0
CONSTIPATION: 0
HEMATURIA: 0
FEVER: 0
CHILLS: 0
FREQUENCY: 0
FATIGUE: 1
MYALGIAS: 0
ARTHRALGIAS: 0
LIGHT-HEADEDNESS: 0
HEADACHES: 0
DIZZINESS: 0
DIARRHEA: 0
DIFFICULTY URINATING: 0
DYSURIA: 0
SHORTNESS OF BREATH: 0
VOMITING: 0

## 2024-03-12 ASSESSMENT — PATIENT HEALTH QUESTIONNAIRE - PHQ9
SUM OF ALL RESPONSES TO PHQ9 QUESTIONS 1 AND 2: 0
2. FEELING DOWN, DEPRESSED OR HOPELESS: NOT AT ALL
1. LITTLE INTEREST OR PLEASURE IN DOING THINGS: NOT AT ALL

## 2024-03-12 ASSESSMENT — PAIN SCALES - GENERAL: PAINLEVEL: 0-NO PAIN

## 2024-03-12 NOTE — ASSESSMENT & PLAN NOTE
Hospital follow-up for UTI, dehydration and feeling lightheaded.  Patient was given an order to repeat a chest x-ray the first week of April.  Lasix will be changed today to 20 mg on Monday Wednesday Friday along with her potassium to see if this keeps her euvolemic.     No

## 2024-03-12 NOTE — PROGRESS NOTES
CC: painful thickened and elongated toenails    HPI:  Pt presents complaining painful thickened and elongated toenails that are difficult to manage.  Onset was gradual with worsening course until recently.  Aggravated by shoe gear and ambulation.       PCP: Dr. Coelho  Last visit: 3-7-24     PMH  Past Medical History:   Diagnosis Date    Abrasion, left lower leg, initial encounter 07/24/2021    Abrasion, left lower leg, initial encounter    Acute maxillary sinusitis, unspecified 02/12/2015    Acute maxillary sinusitis    Acute upper respiratory infection, unspecified 02/25/2013    Acute upper respiratory infection    Cystocele, unspecified (CODE) 09/25/2018    Vaginal wall prolapse    Dizziness and giddiness 10/12/2020    Light headed    Encounter for follow-up examination after completed treatment for conditions other than malignant neoplasm 10/12/2020    Hospital discharge follow-up    Encounter for follow-up examination after completed treatment for conditions other than malignant neoplasm 02/21/2017    Hospital discharge follow-up    Encounter for follow-up examination after completed treatment for conditions other than malignant neoplasm 03/25/2021    Hospital discharge follow-up    Encounter for follow-up examination after completed treatment for conditions other than malignant neoplasm 10/12/2020    Hospital discharge follow-up    Encounter for immunization 09/28/2012    Need for prophylactic vaccination and inoculation against influenza    Major depressive disorder, single episode, moderate (CMS/HCC) 04/19/2021    Current moderate episode of major depressive disorder    Menopausal and female climacteric states 05/05/2020    Postmenopausal syndrome    Other conditions influencing health status     Basal Cell Carcinoma Of The Eyelid    Other conditions influencing health status     History of cough    Other conditions influencing health status 12/07/2021    History of cough    Other conditions influencing  health status 02/11/2021    History of cough    Other conditions influencing health status 07/02/2013    Coronary Artery Disease    Other displaced fracture of upper end of left humerus, initial encounter for closed fracture 03/30/2021    Fracture of humeral head, left, closed    Other general symptoms and signs 12/21/2020    Cold intolerance    Other specified abnormal findings of blood chemistry 03/13/2022    Abnormal thyroid blood test    Other specified diseases of liver 10/05/2015    Liver cyst    Palpitations 04/27/2021    Intermittent palpitations    Paresthesia of skin 10/12/2020    Paresthesia of finger    Personal history of other (healed) physical injury and trauma 09/28/2012    History of strain of back    Personal history of other diseases of the digestive system 11/18/2015    History of gastroenteritis    Personal history of other diseases of the respiratory system 05/21/2016    History of sinusitis    Personal history of other drug therapy 09/19/2017    History of influenza vaccination    Personal history of other endocrine, nutritional and metabolic disease 11/18/2015    History of dehydration    Personal history of other infectious and parasitic diseases 01/18/2017    History of viral infection    Personal history of other infectious and parasitic diseases 12/01/2021    History of viral infection    Personal history of other specified conditions 03/09/2021    History of tachycardia    Personal history of other specified conditions 03/09/2021    History of fatigue    Personal history of other specified conditions 05/05/2020    History of insomnia    Personal history of other specified conditions 05/13/2020    History of nausea and vomiting    Personal history of other specified conditions 08/09/2021    History of palpitations    Pyuria 04/04/2021    Pyuria    Qualitative platelet defects (CMS/HCC)     Thrombocytopathy    Wedge compression fracture of unspecified thoracic vertebra, initial encounter  for closed fracture (CMS/[facility]) 05/05/2020    Compression fracture of thoracic vertebra    Wedge compression fracture of unspecified thoracic vertebra, initial encounter for closed fracture (CMS/[facility]) 07/02/2013    Compression fracture of thoracic vertebra     MEDS    Current Outpatient Medications:     acetaminophen (TylenoL) 325 mg tablet, Tylenol 325 MG Oral Tablet  Refills: 0     Active, Disp: , Rfl:     ALPRAZolam (Xanax) 0.25 mg tablet, Take 1 tablet (0.25 mg) by mouth once daily as needed for anxiety (once daily as needed for anxiety)., Disp: 90 tablet, Rfl: 0    amLODIPine (Norvasc) 5 mg tablet, Take 1 tablet (5 mg) by mouth 2 times a day., Disp: 180 tablet, Rfl: 3    amoxicillin (Amoxil) 500 mg capsule, TAKE FOUR CAPSULES BY MOUTH ONE HOUR BEFORE APPOINTMENT, Disp: , Rfl:     atorvastatin (Lipitor) 10 mg tablet, Take 0.5 tablets (5 mg) by mouth once daily., Disp: 90 tablet, Rfl: 3    bacitracin-polymyxin B (Polysporin) ophthalmic ointment, 1 Application 4 times a day., Disp: , Rfl:     benazepril (Lotensin) 40 mg tablet, TAKE 1 TABLET BY MOUTH TWICE  DAILY, Disp: 180 tablet, Rfl: 0    calcium carbonate 600 mg calcium (1,500 mg) tablet, Take 1 tablet (1,500 mg) by mouth 3 times a day., Disp: , Rfl:     cholecalciferol (Vitamin D-3) 25 MCG (1000 UT) capsule, TAKE 1 CAPSULE M, W,Fri, Sat, Disp: , Rfl:     clobetasol (Temovate) 0.05 % cream, Apply cream topically to affected areas on the trunk & extremities twice daily until clear, then as needed for flares cover with thick moisturizer, avoid groin, face, Disp: , Rfl:     estradiol (Estrace) 0.01 % (0.1 mg/gram) vaginal cream, , Disp: , Rfl:     famotidine (Pepcid) 20 mg tablet, Take 1 tablet (20 mg) by mouth 2 times a day., Disp: , Rfl:     fluticasone (Flonase) 50 mcg/actuation nasal spray, USE 1 SPRAY IN BOTH NOSTRILS  ONCE DAILY, Disp: 16 g, Rfl: 0    furosemide (Lasix) 20 mg tablet, Take 0.5 tablets (10 mg) by mouth once daily., Disp: 90 tablet, Rfl: 3     ketoconazole (NIZOral) 2 % shampoo, WASH SCALP DAILY UNTIL CLEAR. THEN USE 2-3 TIMES A WEEK FOR MAINTENANCE, LET SIT FOR 3-5 MINUTES THEN RINSE, Disp: , Rfl:     mometasone (Elocon) 0.1 % cream, APPLY CREAM TOPICALLY TWICE DAILY TO AFFECTED AREA ON LEG UNTIL RESOLVED, TAPER AS DIRECTED, Disp: , Rfl:     nebivolol (Bystolic) 5 mg tablet, Take 1 tablet (5 mg) by mouth once daily., Disp: 90 tablet, Rfl: 3    neomycin-polymyxin-dexAMETHasone (Maxitrol) 3.5mg/mL-10,000 unit/mL-0.1 % ophthalmic suspension, , Disp: , Rfl:     nitroglycerin (Nitrostat) 0.4 mg SL tablet, Place 1 tablet (0.4 mg) under the tongue every 5 minutes if needed for chest pain. For up to 3 doses. Call 911 if pain persists., Disp: , Rfl:     oxyquinoline-sod.lauryl sulfat (Trimo-Stovall Jelly) 0.025-0.01 % gel, Insert into the vagina., Disp: , Rfl:     potassium chloride CR 20 mEq ER tablet, TAKE 1 TABLET BY MOUTH MONDAY,  WEDNESDAY, AND FRIDAY, Disp: 36 tablet, Rfl: 0    Premarin vaginal cream, USE 1G VAGINALLY ONCE DAILY AT BEDTIME AS DIRECTED, Disp: , Rfl:     psyllium (Metamucil) powder, Take by mouth once daily., Disp: , Rfl:     psyllium husk, with sugar, (Metamucil, with sugar,) 3.4 gram/12 gram powder, Take by mouth., Disp: , Rfl:     sertraline (Zoloft) 25 mg tablet, Take 1 tablet (25 mg) by mouth once daily., Disp: 30 tablet, Rfl: 5  Allergies  Allergies   Allergen Reactions    Erythromycin Diarrhea and Shortness of breath    Amlodipine Other     Ankle edema in higher doses    Cephalexin Unknown and Other     sore mouth    Hydralazine Unknown     Chest pain    Hydrochlorothiazide Unknown    Lisinopril-Hydrochlorothiazide Other    Nifedipine Unknown    Streptomycin Unknown    Tetracyclines Unknown    Valsartan Unknown     Social History     Socioeconomic History    Marital status:      Spouse name: None    Number of children: None    Years of education: None    Highest education level: None   Occupational History    None   Tobacco Use     Smoking status: Former     Packs/day: 0.50     Years: 10.00     Additional pack years: 0.00     Total pack years: 5.00     Types: Cigarettes     Start date:      Quit date: 1960     Years since quittin.2     Passive exposure: Never    Smokeless tobacco: Never   Substance and Sexual Activity    Alcohol use: Yes     Comment: once in a while    Drug use: Never    Sexual activity: None   Other Topics Concern    None   Social History Narrative    None     Social Determinants of Health     Financial Resource Strain: Not on file   Food Insecurity: Not on file   Transportation Needs: Not on file   Physical Activity: Not on file   Stress: Not on file   Social Connections: Not on file   Intimate Partner Violence: Not on file   Housing Stability: Not on file     Family History   Problem Relation Name Age of Onset    Coronary artery disease Mother      Hypertension Mother      Coronary artery disease Father       Past Surgical History:   Procedure Laterality Date    BREAST LUMPECTOMY  2012    Breast Surgery Lumpectomy    HERNIA REPAIR  2012    Hernia Repair    OTHER SURGICAL HISTORY  2012    Ovarian Cystectomy    OTHER SURGICAL HISTORY  2012    Reported Hx Of Eye Surgery For Cataracts    OTHER SURGICAL HISTORY  2014    Vaginal Surgery    OTHER SURGICAL HISTORY  2014    Vaginal Surgery    TOTAL ABDOMINAL HYSTERECTOMY  2012    Total Abdominal Hysterectomy       REVIEW OF SYSTEMS    DERM:   + as noted in HPI.       Physical examination:   On General Observation: Patient is a pleasant, cooperative, well developed 89 y.o.  adult female. The patient is alert and oriented to time, place and person. Patient has normal affect and mood.  /56   Pulse 80   Temp 36.7 °C (98.1 °F)     Vascular:  DP and PT pulses are 1/4 b/l.  mild edema noted. mild varicosities b/l.  CFT  6-7 seconds to all digits bilateral.  Skin temperature is warm to warm from proximal to distal bilateral.       Muscular: Strength is 5/5 for all instrinsic and extrinsic muscle groups.     Neuro:  Proprioception present.   Sensation to vibration is  present. Protective sensation present  at all pedal sites via [place] Bebe 5.07 monofilament bilateral.  Light touch present bilateral.     Derm:    Decreased hair growth b/l le  Left toenails: 1-5 Brittleness, crumbling upon debridement, subungual debris, elongation, mycotic appearance, tenderness, and thickness.   Right toenails: 1-5 Brittleness, crumbling upon debridement, subungual debris, elongation, mycotic appearance, tenderness, and thickness.   Callus is present plantar feet b/l le    ASSESSMENT:    Callus b/l feet  Pain feet  Tinea Unguium [B35.1]   Pain in right toe(s) [M79.674]   Pain in left toe(s) [M79.675]       PLAN:    -Debrided calluses plantar feet with sharp debridement  - Debrided toenails 1-10 in length and height.   - Follow up in 9-12 weeks.       Bethel Spencer DPM

## 2024-03-12 NOTE — ASSESSMENT & PLAN NOTE
Patient wanted to know if it was okay if she took MiraLAX on a daily basis I told her it was but she should make sure she is already taking Metamucil daily.

## 2024-03-12 NOTE — PATIENT INSTRUCTIONS
Please get chest xray at [facility] after 4/6/24    Try taking the furosamide 20mg or a full pillon Mon, Wed and Friday only along with the potassium    Follow up Dr Coelho in 1 month for BP  and lab check   30 min appointment

## 2024-03-12 NOTE — PROGRESS NOTES
Subjective   Patient ID: Gris Salcido is a 89 y.o. female who presents for follow up for hospital discharge.  BN    Patient is here for hospital follow-up after being hospitalized for dehydration and UTI on March fourth through March 6.  Patient was given fluid rehydration treated with antibiotics and discharged.  During this time they also saw some patchy infiltrates in the chest x-ray but it was not clear if this was atelectasis versus infiltrate.  Since her already treating her with UTI repeat chest x-ray would be appropriate to make sure it is at least stable if not improving    Finally the patient is taking furosemide half a tablet every day which equals 10 mg.  She finds it difficult to cut we did talk about taking it every other day versus just Monday Wednesday and Friday to make it easier on the patient.  At this time she would like to switch and take 20 mg on Monday Wednesday Fridays to see if this can keep her euvolemic        Review of Systems   Constitutional:  Positive for fatigue. Negative for chills and fever.   HENT:  Negative for sore throat.    Eyes:  Negative for visual disturbance.   Respiratory:  Negative for cough and shortness of breath.    Cardiovascular:  Negative for chest pain, palpitations and leg swelling.   Gastrointestinal:  Negative for constipation, diarrhea, nausea and vomiting.   Genitourinary:  Negative for difficulty urinating, dysuria, frequency, hematuria and urgency.   Musculoskeletal:  Negative for arthralgias and myalgias.   Skin:  Negative for rash.   Neurological:  Negative for dizziness, syncope, weakness, light-headedness and headaches.       Objective   Medication Documentation Review Audit       Reviewed by Cinthia Nolan MA (Medical Assistant) on 03/12/24 at 1350      Medication Order Taking? Sig Documenting Provider Last Dose Status   acetaminophen (TylenoL) 325 mg tablet [id number] No Tylenol 325 MG Oral Tablet   Refills: 0       Active Historical Provider, MD  Taking Active   ALPRAZolam (Xanax) 0.25 mg tablet [id number] No Take 1 tablet (0.25 mg) by mouth once daily as needed for anxiety (once daily as needed for anxiety). Noa Coelho MD Taking Active   amLODIPine (Norvasc) 5 mg tablet [id number] No Take 1 tablet (5 mg) by mouth 2 times a day. Noa Coelho MD Taking Active   amoxicillin (Amoxil) 500 mg capsule [id number] No TAKE FOUR CAPSULES BY MOUTH ONE HOUR BEFORE APPOINTMENT Historical Provider, MD Taking Active   atorvastatin (Lipitor) 10 mg tablet [id number] No Take 0.5 tablets (5 mg) by mouth once daily. Noa Coelho MD Taking Active   bacitracin-polymyxin B (Polysporin) ophthalmic ointment [id number] No 1 Application 4 times a day. Historical Provider, MD Taking Active   benazepril (Lotensin) 40 mg tablet [id number] No TAKE 1 TABLET BY MOUTH TWICE  DAILY Noa Coelho MD Taking Active   calcium carbonate 600 mg calcium (1,500 mg) tablet [id number] No Take 1 tablet (1,500 mg) by mouth 3 times a day. Historical Provider, MD Taking Active   cholecalciferol (Vitamin D-3) 25 MCG (1000 UT) capsule [id number] No TAKE 1 CAPSULE M, W,Fri, Sat Historical Provider, MD Taking Active   clobetasol (Temovate) 0.05 % cream [id number] No Apply cream topically to affected areas on the trunk & extremities twice daily until clear, then as needed for flares cover with thick moisturizer, avoid groin, face Historical Provider, MD Taking Active   estradiol (Estrace) 0.01 % (0.1 mg/gram) vaginal cream [id number] No  Historical Provider, MD Taking Active   famotidine (Pepcid) 20 mg tablet [id number] No Take 1 tablet (20 mg) by mouth 2 times a day. Historical Provider, MD Taking Active   fluticasone (Flonase) 50 mcg/actuation nasal spray [id number] No USE 1 SPRAY IN BOTH NOSTRILS  ONCE DAILY Noa Coelho MD Taking Active   furosemide (Lasix) 20 mg tablet [id number] No Take 0.5 tablets (10 mg) by mouth once daily. Noa Coelho MD Taking Active   ketoconazole (NIZOral) 2 % shampoo [id number] No  WASH SCALP DAILY UNTIL CLEAR. THEN USE 2-3 TIMES A WEEK FOR MAINTENANCE, LET SIT FOR 3-5 MINUTES THEN RINSE Historical Provider, MD Taking Active   mometasone (Elocon) 0.1 % cream [id number] No APPLY CREAM TOPICALLY TWICE DAILY TO AFFECTED AREA ON LEG UNTIL RESOLVED, TAPER AS DIRECTED Historical Provider, MD Taking Active   nebivolol (Bystolic) 5 mg tablet [id number]  Take 1 tablet (5 mg) by mouth once daily. Jigna Alexander APRN-CNP  Active   neomycin-polymyxin-dexAMETHasone (Maxitrol) 3.5mg/mL-10,000 unit/mL-0.1 % ophthalmic suspension [id number] No  Historical Provider, MD Taking Active   nitroglycerin (Nitrostat) 0.4 mg SL tablet [id number] No Place 1 tablet (0.4 mg) under the tongue every 5 minutes if needed for chest pain. For up to 3 doses. Call 911 if pain persists. Historical Provider, MD Taking Active   oxyquinoline-sod.lauryl sulfat (Trimo-Stvoall Jelly) 0.025-0.01 % gel [id number] No Insert into the vagina. Historical Provider, MD Taking Active   potassium chloride CR 20 mEq ER tablet [id number]  TAKE 1 TABLET BY MOUTH MONDAY,  WEDNESDAY, AND FRIDAY Noa Coelho MD  Active   Premarin vaginal cream [id number] No USE 1G VAGINALLY ONCE DAILY AT BEDTIME AS DIRECTED Historical Provider, MD Taking Active   psyllium (Metamucil) powder [id number] No Take by mouth once daily. Historical Provider, MD Taking Active   psyllium husk, with sugar, (Metamucil, with sugar,) 3.4 gram/12 gram powder [id number] No Take by mouth. Historical Provider, MD Taking Active   sertraline (Zoloft) 25 mg tablet [id number] No Take 1 tablet (25 mg) by mouth once daily. Noa Coelho MD Taking  24 [id number]                  Allergies   Allergen Reactions    Erythromycin Diarrhea and Shortness of breath    Amlodipine Other     Ankle edema in higher doses    Cephalexin Unknown and Other     sore mouth    Hydralazine Unknown     Chest pain    Hydrochlorothiazide Unknown    Lisinopril-Hydrochlorothiazide Other    Nifedipine Unknown    Streptomycin  "Unknown    Tetracyclines Unknown    Valsartan Unknown     Physical Exam  Constitutional:       Appearance: Normal appearance.   HENT:      Head: Normocephalic and atraumatic.      Nose: Nose normal.   Eyes:      Extraocular Movements: Extraocular movements intact.      Pupils: Pupils are equal, round, and reactive to light.   Cardiovascular:      Rate and Rhythm: Normal rate and regular rhythm.   Pulmonary:      Breath sounds: Normal breath sounds.   Abdominal:      General: Abdomen is flat. Bowel sounds are normal.      Palpations: Abdomen is soft.   Musculoskeletal:      Right lower leg: Edema present.      Left lower leg: Edema present.      Comments: Patient has significant kyphosis and around her lunch back from old compression fractures.   Neurological:      Mental Status: She is alert.     /71 (BP Location: Left arm, Patient Position: Sitting)   Pulse 77   Ht 1.562 m (5' 1.5\") Comment: with shoes on  Wt 55.2 kg (121 lb 12.8 oz)   SpO2 96%   BMI 22.64 kg/m²       Assessment/Plan   Problem List Items Addressed This Visit       Hypertension    Constipation, chronic     Patient wanted to know if it was okay if she took MiraLAX on a daily basis I told her it was but she should make sure she is already taking Metamucil daily.         Hospital discharge follow-up     Hospital follow-up for UTI, dehydration and feeling lightheaded.  Patient was given an order to repeat a chest x-ray the first week of April.  Lasix will be changed today to 20 mg on Monday Wednesday Friday along with her potassium to see if this keeps her euvolemic.           Dehydration    Acute cystitis without hematuria    Abnormal chest x-ray - Primary    Relevant Orders    XR chest 2 views     I will see the patient back in 1 month to make sure blood pressure and fluid status is stable.     Since she is coming back in 1 month we will do the wellness visit at that time as well.    It has been a pleasure seeing you.  oNa Coelho, " MD

## 2024-03-15 ENCOUNTER — PATIENT OUTREACH (OUTPATIENT)
Dept: PRIMARY CARE | Facility: CLINIC | Age: 89
End: 2024-03-15
Payer: MEDICARE

## 2024-03-15 NOTE — PROGRESS NOTES
Call regarding appt. with PCP on (3/12/24) after hospitalization.  At time of outreach call the patient feels as if their condition has improved since last visit. Patient states she is slowly but surely doing better.   Reviewed the PCP appointment with the pt and addressed any questions or concerns.

## 2024-03-21 ENCOUNTER — APPOINTMENT (OUTPATIENT)
Dept: PRIMARY CARE | Facility: CLINIC | Age: 89
End: 2024-03-21
Payer: MEDICARE

## 2024-04-01 ENCOUNTER — TELEPHONE (OUTPATIENT)
Dept: PRIMARY CARE | Facility: CLINIC | Age: 89
End: 2024-04-01
Payer: MEDICARE

## 2024-04-01 NOTE — TELEPHONE ENCOUNTER
Patient was in the hospital March 4-6    She states the hospital took her benazepril 40 mg down to 20 mg    Patient states she did not switch and she stayed taking the 40 mg    Patient want to know what do you want her to do    Please advise

## 2024-04-02 NOTE — TELEPHONE ENCOUNTER
Patient left message and stated:    She took her BP today  11:30 am-162/64, 74    3:30 pm-153/62, 75

## 2024-04-02 NOTE — TELEPHONE ENCOUNTER
"Patient updated on Dr Coelho's recommendation.    Patient stated \"at times the PT gets a lower reading in the 110's or 120's, should I be worried?\"    Patient encouraged to take BP a couple times throughout the day and take note and bring those reading into her appointment with Dr Coelho, also let her know not to be worried and those 110;s an 120's were not low or bad readings.  "

## 2024-04-03 PROCEDURE — G0179 MD RECERTIFICATION HHA PT: HCPCS | Performed by: INTERNAL MEDICINE

## 2024-04-11 ENCOUNTER — TELEPHONE (OUTPATIENT)
Dept: PRIMARY CARE | Facility: CLINIC | Age: 89
End: 2024-04-11
Payer: MEDICARE

## 2024-04-11 ENCOUNTER — HOSPITAL ENCOUNTER (OUTPATIENT)
Dept: RADIOLOGY | Facility: CLINIC | Age: 89
Discharge: HOME | End: 2024-04-11
Payer: MEDICARE

## 2024-04-11 DIAGNOSIS — R93.89 ABNORMAL CHEST X-RAY: ICD-10-CM

## 2024-04-11 PROCEDURE — 71046 X-RAY EXAM CHEST 2 VIEWS: CPT

## 2024-04-11 PROCEDURE — 71046 X-RAY EXAM CHEST 2 VIEWS: CPT | Performed by: RADIOLOGY

## 2024-04-11 NOTE — TELEPHONE ENCOUNTER
[facility] to fill out a request for outside images for comparison to chest xray images taken today.

## 2024-04-16 ENCOUNTER — OFFICE VISIT (OUTPATIENT)
Dept: PRIMARY CARE | Facility: CLINIC | Age: 89
End: 2024-04-16
Payer: MEDICARE

## 2024-04-16 VITALS
WEIGHT: 118 LBS | OXYGEN SATURATION: 95 % | BODY MASS INDEX: 21.71 KG/M2 | HEART RATE: 71 BPM | HEIGHT: 62 IN | DIASTOLIC BLOOD PRESSURE: 60 MMHG | SYSTOLIC BLOOD PRESSURE: 128 MMHG

## 2024-04-16 DIAGNOSIS — D64.9 ANEMIA, UNSPECIFIED TYPE: ICD-10-CM

## 2024-04-16 DIAGNOSIS — K59.09 CONSTIPATION, CHRONIC: ICD-10-CM

## 2024-04-16 DIAGNOSIS — E78.5 HYPERLIPIDEMIA, UNSPECIFIED HYPERLIPIDEMIA TYPE: ICD-10-CM

## 2024-04-16 DIAGNOSIS — Z13.89 SCREENING FOR MULTIPLE CONDITIONS: ICD-10-CM

## 2024-04-16 DIAGNOSIS — Z00.00 ROUTINE GENERAL MEDICAL EXAMINATION AT HEALTH CARE FACILITY: Primary | ICD-10-CM

## 2024-04-16 DIAGNOSIS — Z00.00 WELLNESS EXAMINATION: ICD-10-CM

## 2024-04-16 DIAGNOSIS — Z71.89 ACP (ADVANCE CARE PLANNING): ICD-10-CM

## 2024-04-16 DIAGNOSIS — I10 PRIMARY HYPERTENSION: ICD-10-CM

## 2024-04-16 DIAGNOSIS — I47.19 ATRIAL TACHYCARDIA (CMS-HCC): ICD-10-CM

## 2024-04-16 DIAGNOSIS — E55.9 VITAMIN D DEFICIENCY: ICD-10-CM

## 2024-04-16 DIAGNOSIS — Z13.31 DEPRESSION SCREEN: ICD-10-CM

## 2024-04-16 DIAGNOSIS — I51.89 LEFT VENTRICULAR DIASTOLIC DYSFUNCTION, NYHA CLASS 1: ICD-10-CM

## 2024-04-16 DIAGNOSIS — Z66 DNR (DO NOT RESUSCITATE): ICD-10-CM

## 2024-04-16 DIAGNOSIS — M15.9 PRIMARY OSTEOARTHRITIS INVOLVING MULTIPLE JOINTS: ICD-10-CM

## 2024-04-16 DIAGNOSIS — Z13.39 ALCOHOL SCREENING: ICD-10-CM

## 2024-04-16 PROCEDURE — 99214 OFFICE O/P EST MOD 30 MIN: CPT | Performed by: INTERNAL MEDICINE

## 2024-04-16 PROCEDURE — G0444 DEPRESSION SCREEN ANNUAL: HCPCS | Performed by: INTERNAL MEDICINE

## 2024-04-16 PROCEDURE — 99497 ADVNCD CARE PLAN 30 MIN: CPT | Performed by: INTERNAL MEDICINE

## 2024-04-16 PROCEDURE — 1126F AMNT PAIN NOTED NONE PRSNT: CPT | Performed by: INTERNAL MEDICINE

## 2024-04-16 PROCEDURE — 1170F FXNL STATUS ASSESSED: CPT | Performed by: INTERNAL MEDICINE

## 2024-04-16 PROCEDURE — 1157F ADVNC CARE PLAN IN RCRD: CPT | Performed by: INTERNAL MEDICINE

## 2024-04-16 PROCEDURE — 1036F TOBACCO NON-USER: CPT | Performed by: INTERNAL MEDICINE

## 2024-04-16 PROCEDURE — 1159F MED LIST DOCD IN RCRD: CPT | Performed by: INTERNAL MEDICINE

## 2024-04-16 PROCEDURE — 3078F DIAST BP <80 MM HG: CPT | Performed by: INTERNAL MEDICINE

## 2024-04-16 PROCEDURE — 3074F SYST BP LT 130 MM HG: CPT | Performed by: INTERNAL MEDICINE

## 2024-04-16 PROCEDURE — G0439 PPPS, SUBSEQ VISIT: HCPCS | Performed by: INTERNAL MEDICINE

## 2024-04-16 PROCEDURE — 1160F RVW MEDS BY RX/DR IN RCRD: CPT | Performed by: INTERNAL MEDICINE

## 2024-04-16 SDOH — ECONOMIC STABILITY: TRANSPORTATION INSECURITY
IN THE PAST 12 MONTHS, HAS LACK OF TRANSPORTATION KEPT YOU FROM MEETINGS, WORK, OR FROM GETTING THINGS NEEDED FOR DAILY LIVING?: NO

## 2024-04-16 SDOH — ECONOMIC STABILITY: GENERAL
WHICH OF THE FOLLOWING WOULD YOU LIKE TO GET CONNECTED TO IN ORDER TO RECEIVE A DISCOUNT OR FOR FREE? (CHOOSE ALL THAT APPLY): NONE OF THESE

## 2024-04-16 SDOH — ECONOMIC STABILITY: FOOD INSECURITY: WITHIN THE PAST 12 MONTHS, THE FOOD YOU BOUGHT JUST DIDN'T LAST AND YOU DIDN'T HAVE MONEY TO GET MORE.: NEVER TRUE

## 2024-04-16 SDOH — ECONOMIC STABILITY: INCOME INSECURITY: IN THE LAST 12 MONTHS, WAS THERE A TIME WHEN YOU WERE NOT ABLE TO PAY THE MORTGAGE OR RENT ON TIME?: NO

## 2024-04-16 SDOH — ECONOMIC STABILITY: GENERAL
WHICH OF THE FOLLOWING DO YOU KNOW HOW TO USE AND HAVE ACCESS TO EVERY DAY? (CHOOSE ALL THAT APPLY): DESKTOP COMPUTER, LAPTOP COMPUTER, OR TABLET WITH BROADBAND INTERNET CONNECTION

## 2024-04-16 SDOH — HEALTH STABILITY: PHYSICAL HEALTH: ON AVERAGE, HOW MANY DAYS PER WEEK DO YOU ENGAGE IN MODERATE TO STRENUOUS EXERCISE (LIKE A BRISK WALK)?: 0 DAYS

## 2024-04-16 SDOH — HEALTH STABILITY: PHYSICAL HEALTH: ON AVERAGE, HOW MANY MINUTES DO YOU ENGAGE IN EXERCISE AT THIS LEVEL?: 0 MIN

## 2024-04-16 SDOH — ECONOMIC STABILITY: FOOD INSECURITY: WITHIN THE PAST 12 MONTHS, YOU WORRIED THAT YOUR FOOD WOULD RUN OUT BEFORE YOU GOT MONEY TO BUY MORE.: NEVER TRUE

## 2024-04-16 SDOH — ECONOMIC STABILITY: HOUSING INSECURITY
IN THE LAST 12 MONTHS, WAS THERE A TIME WHEN YOU DID NOT HAVE A STEADY PLACE TO SLEEP OR SLEPT IN A SHELTER (INCLUDING NOW)?: NO

## 2024-04-16 SDOH — ECONOMIC STABILITY: HOUSING INSECURITY: IN THE LAST 12 MONTHS, HOW MANY PLACES HAVE YOU LIVED?: 1

## 2024-04-16 SDOH — ECONOMIC STABILITY: TRANSPORTATION INSECURITY
IN THE PAST 12 MONTHS, HAS THE LACK OF TRANSPORTATION KEPT YOU FROM MEDICAL APPOINTMENTS OR FROM GETTING MEDICATIONS?: NO

## 2024-04-16 ASSESSMENT — ACTIVITIES OF DAILY LIVING (ADL)
NEEDS ASSISTANCE WITH FOOD: INDEPENDENT
STIL DRIVING: NO
TAKING_MEDICATION: INDEPENDENT
WALKS IN HOME: INDEPENDENT
GROCERY SHOPPING: NEEDS ASSISTANCE
DOING HOUSEWORK: NEEDS ASSISTANCE
FEEDING: INDEPENDENT
USING TELEPHONE: INDEPENDENT
JUDGMENT_ADEQUATE_SAFELY_COMPLETE_DAILY_ACTIVITIES: YES
BATHING: INDEPENDENT
PATIENT'S MEMORY ADEQUATE TO SAFELY COMPLETE DAILY ACTIVITIES?: YES
BATHING: INDEPENDENT
TOILETING: INDEPENDENT
GROCERY_SHOPPING: NEEDS ASSISTANCE
GROOMING: INDEPENDENT
PREPARING MEALS: INDEPENDENT
HEARING - RIGHT EAR: HEARING AID
JUDGMENT_ADEQUATE_SAFELY_COMPLETE_DAILY_ACTIVITIES: YES
MANAGING FINANCES: INDEPENDENT
EATING: INDEPENDENT
FEEDING YOURSELF: INDEPENDENT
ADEQUATE_TO_COMPLETE_ADL: YES
ADEQUATE_TO_COMPLETE_ADL: YES
MANAGING_FINANCES: INDEPENDENT
USING TRANSPORTATION: NEEDS ASSISTANCE
PILL BOX USED: YES
TOILETING: INDEPENDENT
DRESSING: INDEPENDENT
DRESSING YOURSELF: INDEPENDENT
DOING_HOUSEWORK: INDEPENDENT
TAKING MEDICATION: INDEPENDENT
HEARING - LEFT EAR: HEARING AID

## 2024-04-16 ASSESSMENT — SOCIAL DETERMINANTS OF HEALTH (SDOH)
HOW OFTEN DO YOU ATTEND CHURCH OR RELIGIOUS SERVICES?: NEVER
IN A TYPICAL WEEK, HOW MANY TIMES DO YOU TALK ON THE PHONE WITH FAMILY, FRIENDS, OR NEIGHBORS?: THREE TIMES A WEEK
IN THE PAST 12 MONTHS, HAS THE ELECTRIC, GAS, OIL, OR WATER COMPANY THREATENED TO SHUT OFF SERVICE IN YOUR HOME?: NO
DO YOU BELONG TO ANY CLUBS OR ORGANIZATIONS SUCH AS CHURCH GROUPS UNIONS, FRATERNAL OR ATHLETIC GROUPS, OR SCHOOL GROUPS?: NO
HOW OFTEN DO YOU GET TOGETHER WITH FRIENDS OR RELATIVES?: ONCE A WEEK
WITHIN THE LAST YEAR, HAVE YOU BEEN KICKED, HIT, SLAPPED, OR OTHERWISE PHYSICALLY HURT BY YOUR PARTNER OR EX-PARTNER?: NO
HOW HARD IS IT FOR YOU TO PAY FOR THE VERY BASICS LIKE FOOD, HOUSING, MEDICAL CARE, AND HEATING?: NOT HARD AT ALL
WITHIN THE LAST YEAR, HAVE YOU BEEN HUMILIATED OR EMOTIONALLY ABUSED IN OTHER WAYS BY YOUR PARTNER OR EX-PARTNER?: NO
HOW OFTEN DO YOU ATTENT MEETINGS OF THE CLUB OR ORGANIZATION YOU BELONG TO?: NEVER
WITHIN THE LAST YEAR, HAVE TO BEEN RAPED OR FORCED TO HAVE ANY KIND OF SEXUAL ACTIVITY BY YOUR PARTNER OR EX-PARTNER?: NO
WITHIN THE LAST YEAR, HAVE YOU BEEN AFRAID OF YOUR PARTNER OR EX-PARTNER?: NO

## 2024-04-16 ASSESSMENT — ENCOUNTER SYMPTOMS
LIGHT-HEADEDNESS: 0
CHILLS: 0
VOMITING: 0
DIZZINESS: 0
HEADACHES: 0
DIARRHEA: 0
DIFFICULTY URINATING: 0
FEVER: 0
ARTHRALGIAS: 0
DYSURIA: 0
FATIGUE: 0
SORE THROAT: 0
NAUSEA: 0
FREQUENCY: 0
COUGH: 0
SHORTNESS OF BREATH: 0
CONSTIPATION: 0
MYALGIAS: 0
PALPITATIONS: 0
WEAKNESS: 0
HEMATURIA: 0

## 2024-04-16 ASSESSMENT — PAIN SCALES - GENERAL: PAINLEVEL: 0-NO PAIN

## 2024-04-16 ASSESSMENT — LIFESTYLE VARIABLES
SKIP TO QUESTIONS 9-10: 1
HOW MANY STANDARD DRINKS CONTAINING ALCOHOL DO YOU HAVE ON A TYPICAL DAY: 1 OR 2
HOW OFTEN DO YOU HAVE A DRINK CONTAINING ALCOHOL: 2-4 TIMES A MONTH
AUDIT-C TOTAL SCORE: 2
HOW OFTEN DO YOU HAVE SIX OR MORE DRINKS ON ONE OCCASION: NEVER

## 2024-04-16 NOTE — PATIENT INSTRUCTIONS
GEt fasting labs in June    Follow up Dr Coelho in 3 months with 30 min    Take metamucil every day  Take colace 100mg twice a day    Take Miralax if you are still constipated with the metamucil and colace

## 2024-04-16 NOTE — PROGRESS NOTES
"Subjective   Reason for Visit: Gris Salcido is an 89 y.o. female here for a Medicare Wellness visit.     Past Medical, Surgical, and Family History reviewed and updated in chart.    Reviewed all medications by prescribing practitioner or clinical pharmacist (such as prescriptions, OTCs, herbal therapies and supplements) and documented in the medical record.    Patient is a 89-year-old female here for an annual wellness visit with her niece Zaira.  Patient was recently in the ER for an episode of weakness but everything seems to have stabilized since then.  Patient notes she has a note or an appointment with the cardiologist on May 10.          Patient Care Team:  Noa Coelho MD as PCP - General  Noa Coelho MD as PCP - [facility] ACO Attributed Provider  Selene Fragoso LPN as Care Manager (Case Management)     Review of Systems   Constitutional:  Negative for chills, fatigue and fever.   HENT:  Negative for sore throat.    Eyes:  Negative for visual disturbance.   Respiratory:  Negative for cough and shortness of breath.    Cardiovascular:  Negative for chest pain, palpitations and leg swelling.   Gastrointestinal:  Negative for constipation, diarrhea, nausea and vomiting.   Genitourinary:  Negative for difficulty urinating, dysuria, frequency, hematuria and urgency.   Musculoskeletal:  Negative for arthralgias and myalgias.   Skin:  Negative for rash.   Neurological:  Negative for dizziness, syncope, weakness, light-headedness and headaches.       Objective   Vitals:  /60   Pulse 71   Ht 1.562 m (5' 1.5\") Comment: with shoes on  Wt 53.5 kg (118 lb)   SpO2 95%   BMI 21.93 kg/m²       Physical Exam  Constitutional:       Appearance: Normal appearance.   HENT:      Head: Normocephalic and atraumatic.      Nose: Nose normal.   Eyes:      Extraocular Movements: Extraocular movements intact.      Pupils: Pupils are equal, round, and reactive to light.   Cardiovascular:      Rate and Rhythm: Normal rate and " regular rhythm.   Pulmonary:      Breath sounds: Normal breath sounds.   Abdominal:      General: Abdomen is flat. Bowel sounds are normal.      Palpations: Abdomen is soft.   Musculoskeletal:      Right lower leg: No edema.      Left lower leg: No edema.      Comments: Patient has significant kyphosis or hunched back due to compression fractures.   Neurological:      Mental Status: She is alert.         Assessment/Plan   Problem List Items Addressed This Visit       Osteoarthritis    Relevant Orders    Lipid Panel    CBC    Comprehensive Metabolic Panel    TSH with reflex to Free T4 if abnormal    Vitamin D 25-Hydroxy,Total (for eval of Vitamin D levels)    Atrial tachycardia (CMS-HCC)    Relevant Orders    Lipid Panel    CBC    Comprehensive Metabolic Panel    TSH with reflex to Free T4 if abnormal    Vitamin D 25-Hydroxy,Total (for eval of Vitamin D levels)    Hyperlipidemia    Current Assessment & Plan     Annual blood work is due in June and she was given a requisition today to complete it in June.         Relevant Orders    Lipid Panel    CBC    Comprehensive Metabolic Panel    TSH with reflex to Free T4 if abnormal    Vitamin D 25-Hydroxy,Total (for eval of Vitamin D levels)    Left ventricular diastolic dysfunction, NYHA class 1    Current Assessment & Plan     Patient does have a CAD, some congestive heart failure.  She is due for an echo I will discuss this with her cardiologist at her appointment on May 10.         Vitamin D deficiency    Relevant Orders    Lipid Panel    CBC    Comprehensive Metabolic Panel    TSH with reflex to Free T4 if abnormal    Vitamin D 25-Hydroxy,Total (for eval of Vitamin D levels)    Hypertension    Current Assessment & Plan     Blood pressure stable and well-controlled no changes needed and she does not need refills today.         Relevant Orders    Lipid Panel    CBC    Comprehensive Metabolic Panel    TSH with reflex to Free T4 if abnormal    Vitamin D 25-Hydroxy,Total (for  eval of Vitamin D levels)    Constipation, chronic    Current Assessment & Plan     Patient's chronic constipation is an ongoing issue.  She only takes Metamucil as needed so I encouraged her to continue taking Metamucil every day as well as Colace 100 mg twice a day.  If she gets constipated in addition to this then she should add MiraLAX.  Patient states understanding will try to continue the Metamucil and Colace more regularly.         Anemia    Relevant Orders    Lipid Panel    CBC    Comprehensive Metabolic Panel    TSH with reflex to Free T4 if abnormal    Vitamin D 25-Hydroxy,Total (for eval of Vitamin D levels)    ACP (advance care planning)    Current Assessment & Plan     Patient already has a living will and DURABLE POWER OF  however her son Serafin was named first and he is .  Her second son Kaleb is currently her power of  but she wishes to change this.  She did discuss the options and will contact her  to work on changing her living will and power of .  She is clear that she does not want life-sustaining measures performed and will make sure that her new living will reflect this    We also discussed at length full code versus DNR comfort care or DNR Comfort Care arrest.  At age 89 the patient really does not want to be brought back if she has a poor outcome or not a good possibility of an outcome.  Because of her advanced age and osteoporosis she really does not want CPR done so we did sign a DNR Comfort Care arrest order today.      In total approximately 17 minutes in addition to the regular wellness visit was spent in advance care planning.         Depression screen    Current Assessment & Plan     Extra screening questions were reviewed with the patient because of her depression scores.  It was determined that much of her inability to do things and cutting out activities is related to her age and fatigue more than 2 depression.  Approximately 7 to 10 minutes  was spent in addition to the wellness visit reviewing possible depression in this patient.  She declines treatment and does not want medications for it at this time.         Wellness examination    Current Assessment & Plan     Wellness visit was completed today.  Safety issues have already been addressed.  Patient is going to work on a new power of  and living will and a DNR Comfort Care arrest order was signed today    Social determinants of health were also reviewed in some detail today.  Patient is no longer able to drive and is having a harder and harder time getting groceries and making meals.  For this reason we are going to consult our chronic care management nurse Austin and get the patient Meals on Wheels through the [facility] as well as teacher how to call and make arrangements for city transportation and the bus in [facility] for healthcare visits.  Patient does have children who are willing to drive her but she finds she does not like bothering them and would like to be more independent.    During the annual wellness visit today we did review all of their social determinants of health.  Transportation issues, financial stability, availability of medications as well as home stability were all reviewed with the patient.  Specific deficits or indicators were addressed with the patient.  Approximately 7 to 10 minutes was spent in this activity in addition to the annual wellness visit.           DNR (do not resuscitate)    Overview     DNR Comfort Care arrest order was explained to the patient and his entirety and she did agree and signed off on the paperwork.  She was given the original and told to place it on the back of her door for emergency personnel and to keep a copy of it with her power of  and living will.  She plans on riding a new living will and power of  soon I will bring us copies when it is completed.         Alcohol screening    Screening for  multiple conditions    Current Assessment & Plan     Alcohol screen was negative.  Patient's depression screen is somewhat positive but seems to reflect more upon her advanced age than anything else.  We did discuss the possibility of treatment with depression which she declines at this time.  She says she does not feel like she is depressed just that she is older and it is harder to do many of her normal activities.          Other Visit Diagnoses       Routine general medical examination at health care facility    -  Primary    Relevant Orders    Lipid Panel    CBC    Comprehensive Metabolic Panel    TSH with reflex to Free T4 if abnormal    Vitamin D 25-Hydroxy,Total (for eval of Vitamin D levels)

## 2024-04-16 NOTE — ASSESSMENT & PLAN NOTE
Alcohol screen was negative.  Patient's depression screen is somewhat positive but seems to reflect more upon her advanced age than anything else.  We did discuss the possibility of treatment with depression which she declines at this time.  She says she does not feel like she is depressed just that she is older and it is harder to do many of her normal activities.  
Annual blood work is due in June and she was given a requisition today to complete it in June.  
Blood pressure stable and well-controlled no changes needed and she does not need refills today.  
Extra screening questions were reviewed with the patient because of her depression scores.  It was determined that much of her inability to do things and cutting out activities is related to her age and fatigue more than 2 depression.  Approximately 7 to 10 minutes was spent in addition to the wellness visit reviewing possible depression in this patient.  She declines treatment and does not want medications for it at this time.  
Patient already has a living will and DURABLE POWER OF  however her son Serafin was named first and he is .  Her second son Kaleb is currently her power of  but she wishes to change this.  She did discuss the options and will contact her  to work on changing her living will and power of .  She is clear that she does not want life-sustaining measures performed and will make sure that her new living will reflect this    We also discussed at length full code versus DNR comfort care or DNR Comfort Care arrest.  At age 89 the patient really does not want to be brought back if she has a poor outcome or not a good possibility of an outcome.  Because of her advanced age and osteoporosis she really does not want CPR done so we did sign a DNR Comfort Care arrest order today.      In total approximately 17 minutes in addition to the regular wellness visit was spent in advance care planning.  
Patient does have a CAD, some congestive heart failure.  She is due for an echo I will discuss this with her cardiologist at her appointment on May 10.  
Patient's chronic constipation is an ongoing issue.  She only takes Metamucil as needed so I encouraged her to continue taking Metamucil every day as well as Colace 100 mg twice a day.  If she gets constipated in addition to this then she should add MiraLAX.  Patient states understanding will try to continue the Metamucil and Colace more regularly.  
Wellness visit was completed today.  Safety issues have already been addressed.  Patient is going to work on a new power of  and living will and a DNR Comfort Care arrest order was signed today    Social determinants of health were also reviewed in some detail today.  Patient is no longer able to drive and is having a harder and harder time getting groceries and making meals.  For this reason we are going to consult our chronic care management nurse Austin and get the patient Meals on Wheels through the [facility] as well as teacher how to call and make arrangements for city transportation and the bus in [facility] for healthcare visits.  Patient does have children who are willing to drive her but she finds she does not like bothering them and would like to be more independent.    During the annual wellness visit today we did review all of their social determinants of health.  Transportation issues, financial stability, availability of medications as well as home stability were all reviewed with the patient.  Specific deficits or indicators were addressed with the patient.  Approximately 7 to 10 minutes was spent in this activity in addition to the annual wellness visit.    
05-Sep-2023 22:58

## 2024-04-17 ENCOUNTER — PATIENT OUTREACH (OUTPATIENT)
Dept: PRIMARY CARE | Facility: CLINIC | Age: 89
End: 2024-04-17
Payer: MEDICARE

## 2024-04-17 DIAGNOSIS — E78.5 HYPERLIPIDEMIA, UNSPECIFIED HYPERLIPIDEMIA TYPE: ICD-10-CM

## 2024-04-17 DIAGNOSIS — I10 PRIMARY HYPERTENSION: ICD-10-CM

## 2024-04-17 PROCEDURE — 99490 CHRNC CARE MGMT STAFF 1ST 20: CPT | Performed by: INTERNAL MEDICINE

## 2024-04-17 NOTE — PROGRESS NOTES
Call regarding appt. with PCP on (4/16/24) after hospitalization.  At time of outreach call the patient feels as if their condition has (improved) since last visit. Patient reports she is doing as well as to be expected. Reviewed the PCP appointment with the pt and addressed any questions or concerns.

## 2024-04-17 NOTE — PROGRESS NOTES
Patient introduced to Chronic Care Management Services   Patient opted into services on 4/16/24  Services will be performed under the direction of PCP: Noa Coelho  I have discussed the nature and availability of the service with the patient, the patient's responsibility for potential cost sharing, only one practitioner furnishing services during a calendar month, and the right to stop services at any time.         Patient agreed to enroll in CCM program. Patient is in need of transportation to office visits and also meal delivery. I called [facility] and gave them her contact information so they will reach out to her. Patient knows to call me if she needs anything.

## 2024-04-23 ENCOUNTER — HOSPITAL ENCOUNTER (OUTPATIENT)
Dept: RADIOLOGY | Facility: EXTERNAL LOCATION | Age: 89
Discharge: HOME | End: 2024-04-23
Payer: MEDICARE

## 2024-05-06 ENCOUNTER — TELEPHONE (OUTPATIENT)
Dept: PRIMARY CARE | Facility: CLINIC | Age: 89
End: 2024-05-06
Payer: MEDICARE

## 2024-05-06 DIAGNOSIS — E55.9 VITAMIN D DEFICIENCY: ICD-10-CM

## 2024-05-06 DIAGNOSIS — M15.9 OSTEOARTHRITIS OF MULTIPLE JOINTS, UNSPECIFIED OSTEOARTHRITIS TYPE: Primary | ICD-10-CM

## 2024-05-06 DIAGNOSIS — G45.9 TIA (TRANSIENT ISCHEMIC ATTACK): ICD-10-CM

## 2024-05-06 DIAGNOSIS — I10 PRIMARY HYPERTENSION: ICD-10-CM

## 2024-05-06 DIAGNOSIS — E78.5 HYPERLIPIDEMIA, UNSPECIFIED HYPERLIPIDEMIA TYPE: ICD-10-CM

## 2024-05-06 NOTE — TELEPHONE ENCOUNTER
Pt calls in. She is asking if it is okay for her to have her labs drawn 5/16 instead of 5/31? Please advise

## 2024-05-13 ENCOUNTER — OFFICE VISIT (OUTPATIENT)
Dept: CARDIOLOGY | Facility: CLINIC | Age: 89
End: 2024-05-13
Payer: MEDICARE

## 2024-05-13 VITALS
WEIGHT: 117 LBS | DIASTOLIC BLOOD PRESSURE: 62 MMHG | BODY MASS INDEX: 21.53 KG/M2 | OXYGEN SATURATION: 97 % | HEIGHT: 62 IN | SYSTOLIC BLOOD PRESSURE: 159 MMHG | HEART RATE: 71 BPM

## 2024-05-13 DIAGNOSIS — I47.19 ATRIAL TACHYCARDIA (CMS-HCC): ICD-10-CM

## 2024-05-13 DIAGNOSIS — I35.1 NONRHEUMATIC AORTIC VALVE INSUFFICIENCY: ICD-10-CM

## 2024-05-13 DIAGNOSIS — I10 PRIMARY HYPERTENSION: Primary | ICD-10-CM

## 2024-05-13 PROCEDURE — 1159F MED LIST DOCD IN RCRD: CPT | Performed by: INTERNAL MEDICINE

## 2024-05-13 PROCEDURE — 99213 OFFICE O/P EST LOW 20 MIN: CPT | Performed by: INTERNAL MEDICINE

## 2024-05-13 PROCEDURE — 3077F SYST BP >= 140 MM HG: CPT | Performed by: INTERNAL MEDICINE

## 2024-05-13 PROCEDURE — 3078F DIAST BP <80 MM HG: CPT | Performed by: INTERNAL MEDICINE

## 2024-05-13 PROCEDURE — 1126F AMNT PAIN NOTED NONE PRSNT: CPT | Performed by: INTERNAL MEDICINE

## 2024-05-13 PROCEDURE — 1160F RVW MEDS BY RX/DR IN RCRD: CPT | Performed by: INTERNAL MEDICINE

## 2024-05-13 PROCEDURE — 1157F ADVNC CARE PLAN IN RCRD: CPT | Performed by: INTERNAL MEDICINE

## 2024-05-13 PROCEDURE — 1036F TOBACCO NON-USER: CPT | Performed by: INTERNAL MEDICINE

## 2024-05-13 ASSESSMENT — PAIN SCALES - GENERAL: PAINLEVEL: 0-NO PAIN

## 2024-05-13 ASSESSMENT — ENCOUNTER SYMPTOMS
LOSS OF SENSATION IN FEET: 0
OCCASIONAL FEELINGS OF UNSTEADINESS: 0
DEPRESSION: 0

## 2024-05-13 NOTE — PROGRESS NOTES
"History Of Present Illness:      This is a 89-year-old female with history of hypertension and palpitations.  She was evaluated in the emergency department in March 2024 because of generalized weakness but no specific etiology was determined.  Currently no complaints of chest pain or shortness of breath.  Occasionally she has some brief episodes of palpitations.  Her son was present during today's evaluation.    Review of Systems  Other review of systems negative     Last Recorded Vitals:      1/22/2024     2:05 PM 3/12/2024     1:50 PM 3/12/2024     2:13 PM 3/12/2024     3:08 PM 4/16/2024     2:35 PM 4/16/2024     3:30 PM 5/13/2024     1:55 PM   Vitals   Systolic 154 128 165 142 136 128 159   Diastolic 70 56 71 60 64 60 62   Heart Rate 65 80 77  71  71   Temp  36.7 °C (98.1 °F)        Height (in) 1.549 m (5' 1\")  1.562 m (5' 1.5\")  1.562 m (5' 1.5\")  1.562 m (5' 1.5\")   Weight (lb) 119  121.8  118  117   BMI 22.48 kg/m2  22.64 kg/m2  21.93 kg/m2  21.75 kg/m2   BSA (m2) 1.52 m2  1.55 m2  1.52 m2  1.52 m2   Visit Report Report Report    Report Report    Report Report    Report Report Report Report          Allergies:  Erythromycin, Amlodipine, Cephalexin, Hydralazine, Hydrochlorothiazide, Lisinopril-hydrochlorothiazide, Nifedipine, Streptomycin, Tetracyclines, and Valsartan    Outpatient Medications:  Current Outpatient Medications   Medication Instructions    acetaminophen (TylenoL) 325 mg tablet Tylenol 325 MG Oral Tablet   Refills: 0       Active    ALPRAZolam (XANAX) 0.25 mg, oral, Daily PRN    amLODIPine (NORVASC) 5 mg, oral, 2 times daily    amoxicillin (Amoxil) 500 mg capsule TAKE FOUR CAPSULES BY MOUTH ONE HOUR BEFORE APPOINTMENT    atorvastatin (LIPITOR) 5 mg, oral, Daily    bacitracin-polymyxin B (Polysporin) ophthalmic ointment 1 Application, 4 times daily    benazepril (LOTENSIN) 40 mg, oral, 2 times daily    calcium carbonate 600 mg calcium (1,500 mg) tablet 1 tablet, oral, 3 times daily    " cholecalciferol (Vitamin D-3) 25 MCG (1000 UT) capsule TAKE 1 CAPSULE M, W,Fri, Sat    clobetasol (Temovate) 0.05 % cream Apply cream topically to affected areas on the trunk & extremities twice daily until clear, then as needed for flares cover with thick moisturizer, avoid groin, face    estradiol (Estrace) 0.01 % (0.1 mg/gram) vaginal cream     famotidine (Pepcid) 20 mg tablet 1 tablet, oral, 2 times daily    fluticasone (Flonase) 50 mcg/actuation nasal spray 1 spray, Each Nostril, Daily    furosemide (LASIX) 10 mg, oral, Daily    ketoconazole (NIZOral) 2 % shampoo WASH SCALP DAILY UNTIL CLEAR. THEN USE 2-3 TIMES A WEEK FOR MAINTENANCE, LET SIT FOR 3-5 MINUTES THEN RINSE    mometasone (Elocon) 0.1 % cream APPLY CREAM TOPICALLY TWICE DAILY TO AFFECTED AREA ON LEG UNTIL RESOLVED, TAPER AS DIRECTED    nebivolol (BYSTOLIC) 5 mg, oral, Daily    neomycin-polymyxin-dexAMETHasone (Maxitrol) 3.5mg/mL-10,000 unit/mL-0.1 % ophthalmic suspension     nitroglycerin (NITROSTAT) 0.4 mg, sublingual, Every 5 min PRN, For up to 3 doses. Call 911 if pain persists.    oxyquinoline-sod.lauryl sulfat (Trimo-Stovall Jelly) 0.025-0.01 % gel vaginal    potassium chloride CR 20 mEq ER tablet 20 mEq, oral, Every Mon/Wed/Fri    Premarin vaginal cream USE 1G VAGINALLY ONCE DAILY AT BEDTIME AS DIRECTED    psyllium (Metamucil) powder oral, Daily RT    psyllium husk, with sugar, (Metamucil, with sugar,) 3.4 gram/12 gram powder oral    sertraline (ZOLOFT) 25 mg, oral, Daily       Physical Exam:    General Appearance:  Alert, oriented, no distress  Skin:  Warm and dry  Head and Neck:  No elevation of JVP, no carotid bruits  Cardiac Exam:  Rhythm is regular, S1 and S2 are normal, grade 1/6 diastolic murmur at the right upper sternal border  Lungs:  Clear to auscultation  Extremities:  no edema  Neurologic:  No focal deficits  Psychiatric:  Appropriate mood and behavior         Cardiology Tests:  I have personally review the diagnostic cardiac testing  and my interpretation is as follows:    Echocardiogram August 2023: Normal left ventricular function, mild aortic valve regurgitation      Assessment/Plan   Problem List Items Addressed This Visit             ICD-10-CM    Atrial tachycardia (CMS-[facility]) I47.19     1.  Atrial tachycardia: The patient has had brief episodes of atrial tachycardia but the most recent Holter monitor showed only sinus rhythm.  Patient she will remain on the same dose of beta-blocker at this time.    2.  Aortic valve regurgitation: This is mild based on the most recent echocardiogram from August 2023.  She has normal left ventricular systolic function.  Office follow-up in 10 to 12 months.         Nonrheumatic aortic valve insufficiency I35.1    Hypertension - Primary I10       James C Ramicone, DO

## 2024-05-13 NOTE — LETTER
"May 13, 2024     Noa Coelho MD  [address]  [facility], [address]  [facility] [address]    Patient: Gris Salcido   YOB: 1933   Date of Visit: 5/13/2024       Dear Dr. Noa Coelho MD:    Thank you for referring Gris Salcido to me for evaluation. Below are my notes for this consultation.  If you have questions, please do not hesitate to call me. I look forward to following your patient along with you.       Sincerely,     James C Ramicone, DO      CC: No Recipients  ______________________________________________________________________________________    History Of Present Illness:      This is a 89-year-old female with history of hypertension and palpitations.  She was evaluated in the emergency department in March 2024 because of generalized weakness but no specific etiology was determined.  Currently no complaints of chest pain or shortness of breath.  Occasionally she has some brief episodes of palpitations.  Her son was present during today's evaluation.    Review of Systems  Other review of systems negative     Last Recorded Vitals:      1/22/2024     2:05 PM 3/12/2024     1:50 PM 3/12/2024     2:13 PM 3/12/2024     3:08 PM 4/16/2024     2:35 PM 4/16/2024     3:30 PM 5/13/2024     1:55 PM   Vitals   Systolic 154 128 165 142 136 128 159   Diastolic 70 56 71 60 64 60 62   Heart Rate 65 80 77  71  71   Temp  36.7 °C (98.1 °F)        Height (in) 1.549 m (5' 1\")  1.562 m (5' 1.5\")  1.562 m (5' 1.5\")  1.562 m (5' 1.5\")   Weight (lb) 119  121.8  118  117   BMI 22.48 kg/m2  22.64 kg/m2  21.93 kg/m2  21.75 kg/m2   BSA (m2) 1.52 m2  1.55 m2  1.52 m2  1.52 m2   Visit Report Report Report    Report Report    Report Report    Report Report Report Report          Allergies:  Erythromycin, Amlodipine, Cephalexin, Hydralazine, Hydrochlorothiazide, Lisinopril-hydrochlorothiazide, Nifedipine, Streptomycin, Tetracyclines, and Valsartan    Outpatient Medications:  Current Outpatient Medications "   Medication Instructions   • acetaminophen (TylenoL) 325 mg tablet Tylenol 325 MG Oral Tablet   Refills: 0       Active   • ALPRAZolam (XANAX) 0.25 mg, oral, Daily PRN   • amLODIPine (NORVASC) 5 mg, oral, 2 times daily   • amoxicillin (Amoxil) 500 mg capsule TAKE FOUR CAPSULES BY MOUTH ONE HOUR BEFORE APPOINTMENT   • atorvastatin (LIPITOR) 5 mg, oral, Daily   • bacitracin-polymyxin B (Polysporin) ophthalmic ointment 1 Application, 4 times daily   • benazepril (LOTENSIN) 40 mg, oral, 2 times daily   • calcium carbonate 600 mg calcium (1,500 mg) tablet 1 tablet, oral, 3 times daily   • cholecalciferol (Vitamin D-3) 25 MCG (1000 UT) capsule TAKE 1 CAPSULE M, W,Fri, Sat   • clobetasol (Temovate) 0.05 % cream Apply cream topically to affected areas on the trunk & extremities twice daily until clear, then as needed for flares cover with thick moisturizer, avoid groin, face   • estradiol (Estrace) 0.01 % (0.1 mg/gram) vaginal cream    • famotidine (Pepcid) 20 mg tablet 1 tablet, oral, 2 times daily   • fluticasone (Flonase) 50 mcg/actuation nasal spray 1 spray, Each Nostril, Daily   • furosemide (LASIX) 10 mg, oral, Daily   • ketoconazole (NIZOral) 2 % shampoo WASH SCALP DAILY UNTIL CLEAR. THEN USE 2-3 TIMES A WEEK FOR MAINTENANCE, LET SIT FOR 3-5 MINUTES THEN RINSE   • mometasone (Elocon) 0.1 % cream APPLY CREAM TOPICALLY TWICE DAILY TO AFFECTED AREA ON LEG UNTIL RESOLVED, TAPER AS DIRECTED   • nebivolol (BYSTOLIC) 5 mg, oral, Daily   • neomycin-polymyxin-dexAMETHasone (Maxitrol) 3.5mg/mL-10,000 unit/mL-0.1 % ophthalmic suspension    • nitroglycerin (NITROSTAT) 0.4 mg, sublingual, Every 5 min PRN, For up to 3 doses. Call 911 if pain persists.   • oxyquinoline-sod.lauryl sulfat (Trimo-Stovall Jelly) 0.025-0.01 % gel vaginal   • potassium chloride CR 20 mEq ER tablet 20 mEq, oral, Every Mon/Wed/Fri   • Premarin vaginal cream USE 1G VAGINALLY ONCE DAILY AT BEDTIME AS DIRECTED   • psyllium (Metamucil) powder oral, Daily RT   •  psyllium husk, with sugar, (Metamucil, with sugar,) 3.4 gram/12 gram powder oral   • sertraline (ZOLOFT) 25 mg, oral, Daily       Physical Exam:    General Appearance:  Alert, oriented, no distress  Skin:  Warm and dry  Head and Neck:  No elevation of JVP, no carotid bruits  Cardiac Exam:  Rhythm is regular, S1 and S2 are normal, grade 1/6 diastolic murmur at the right upper sternal border  Lungs:  Clear to auscultation  Extremities:  no edema  Neurologic:  No focal deficits  Psychiatric:  Appropriate mood and behavior         Cardiology Tests:  I have personally review the diagnostic cardiac testing and my interpretation is as follows:    Echocardiogram August 2023: Normal left ventricular function, mild aortic valve regurgitation      Assessment/Plan  Problem List Items Addressed This Visit             ICD-10-CM    Atrial tachycardia (CMS-[facility]) I47.19     1.  Atrial tachycardia: The patient has had brief episodes of atrial tachycardia but the most recent Holter monitor showed only sinus rhythm.  Patient she will remain on the same dose of beta-blocker at this time.    2.  Aortic valve regurgitation: This is mild based on the most recent echocardiogram from August 2023.  She has normal left ventricular systolic function.  Office follow-up in 10 to 12 months.         Nonrheumatic aortic valve insufficiency I35.1    Hypertension - Primary I10       James C Ramicone, DO

## 2024-05-13 NOTE — ASSESSMENT & PLAN NOTE
1.  Atrial tachycardia: The patient has had brief episodes of atrial tachycardia but the most recent Holter monitor showed only sinus rhythm.  Patient she will remain on the same dose of beta-blocker at this time.    2.  Aortic valve regurgitation: This is mild based on the most recent echocardiogram from August 2023.  She has normal left ventricular systolic function.  Office follow-up in 10 to 12 months.

## 2024-05-16 ENCOUNTER — TELEPHONE (OUTPATIENT)
Dept: PRIMARY CARE | Facility: CLINIC | Age: 89
End: 2024-05-16

## 2024-05-16 ENCOUNTER — LAB (OUTPATIENT)
Dept: LAB | Facility: LAB | Age: 89
End: 2024-05-16
Payer: MEDICARE

## 2024-05-16 DIAGNOSIS — M15.9 OSTEOARTHRITIS OF MULTIPLE JOINTS, UNSPECIFIED OSTEOARTHRITIS TYPE: ICD-10-CM

## 2024-05-16 DIAGNOSIS — G45.9 TIA (TRANSIENT ISCHEMIC ATTACK): ICD-10-CM

## 2024-05-16 DIAGNOSIS — I47.19 ATRIAL TACHYCARDIA (CMS-HCC): ICD-10-CM

## 2024-05-16 DIAGNOSIS — I10 PRIMARY HYPERTENSION: ICD-10-CM

## 2024-05-16 DIAGNOSIS — D64.9 ANEMIA, UNSPECIFIED TYPE: ICD-10-CM

## 2024-05-16 DIAGNOSIS — E78.5 HYPERLIPIDEMIA, UNSPECIFIED HYPERLIPIDEMIA TYPE: ICD-10-CM

## 2024-05-16 DIAGNOSIS — E55.9 VITAMIN D DEFICIENCY: ICD-10-CM

## 2024-05-16 DIAGNOSIS — M15.9 PRIMARY OSTEOARTHRITIS INVOLVING MULTIPLE JOINTS: ICD-10-CM

## 2024-05-16 DIAGNOSIS — Z00.00 ROUTINE GENERAL MEDICAL EXAMINATION AT HEALTH CARE FACILITY: ICD-10-CM

## 2024-05-16 LAB
25(OH)D3 SERPL-MCNC: 51 NG/ML (ref 30–100)
ALBUMIN SERPL BCP-MCNC: 4.1 G/DL (ref 3.4–5)
ALP SERPL-CCNC: 76 U/L (ref 33–136)
ALT SERPL W P-5'-P-CCNC: 12 U/L (ref 7–45)
ANION GAP SERPL CALC-SCNC: 15 MMOL/L (ref 10–20)
AST SERPL W P-5'-P-CCNC: 18 U/L (ref 9–39)
BILIRUB SERPL-MCNC: 0.6 MG/DL (ref 0–1.2)
BUN SERPL-MCNC: 13 MG/DL (ref 6–23)
CALCIUM SERPL-MCNC: 9.6 MG/DL (ref 8.6–10.6)
CHLORIDE SERPL-SCNC: 97 MMOL/L (ref 98–107)
CHOLEST SERPL-MCNC: 123 MG/DL (ref 0–199)
CHOLESTEROL/HDL RATIO: 2.3
CO2 SERPL-SCNC: 28 MMOL/L (ref 21–32)
CREAT SERPL-MCNC: 0.81 MG/DL (ref 0.5–1.05)
EGFRCR SERPLBLD CKD-EPI 2021: 69 ML/MIN/1.73M*2
ERYTHROCYTE [DISTWIDTH] IN BLOOD BY AUTOMATED COUNT: 13 % (ref 11.5–14.5)
GLUCOSE SERPL-MCNC: 85 MG/DL (ref 74–99)
HCT VFR BLD AUTO: 37.2 % (ref 36–46)
HDLC SERPL-MCNC: 52.4 MG/DL
HGB BLD-MCNC: 12 G/DL (ref 12–16)
LDLC SERPL CALC-MCNC: 59 MG/DL
MCH RBC QN AUTO: 30.2 PG (ref 26–34)
MCHC RBC AUTO-ENTMCNC: 32.3 G/DL (ref 32–36)
MCV RBC AUTO: 94 FL (ref 80–100)
NON HDL CHOLESTEROL: 71 MG/DL (ref 0–149)
NRBC BLD-RTO: 0 /100 WBCS (ref 0–0)
PLATELET # BLD AUTO: 242 X10*3/UL (ref 150–450)
POTASSIUM SERPL-SCNC: 3.7 MMOL/L (ref 3.5–5.3)
PROT SERPL-MCNC: 6.9 G/DL (ref 6.4–8.2)
RBC # BLD AUTO: 3.98 X10*6/UL (ref 4–5.2)
SODIUM SERPL-SCNC: 136 MMOL/L (ref 136–145)
TRIGL SERPL-MCNC: 59 MG/DL (ref 0–149)
TSH SERPL-ACNC: 2.95 MIU/L (ref 0.44–3.98)
VLDL: 12 MG/DL (ref 0–40)
WBC # BLD AUTO: 7.3 X10*3/UL (ref 4.4–11.3)

## 2024-05-16 PROCEDURE — 82306 VITAMIN D 25 HYDROXY: CPT

## 2024-05-16 PROCEDURE — 80061 LIPID PANEL: CPT

## 2024-05-16 PROCEDURE — 85027 COMPLETE CBC AUTOMATED: CPT

## 2024-05-16 PROCEDURE — 36415 COLL VENOUS BLD VENIPUNCTURE: CPT

## 2024-05-16 PROCEDURE — 80053 COMPREHEN METABOLIC PANEL: CPT

## 2024-05-16 PROCEDURE — 84443 ASSAY THYROID STIM HORMONE: CPT

## 2024-05-16 NOTE — TELEPHONE ENCOUNTER
----- Message from Noa Coelho MD sent at 5/16/2024  3:02 PM EDT -----  Please notify patient her labs look good overall.  She has a few minor discrepancies but these are either chronic or not significant.  There are no new orders.

## 2024-05-17 RX ORDER — NEBIVOLOL 5 MG/1
5 TABLET ORAL DAILY
Qty: 90 TABLET | Refills: 3 | Status: SHIPPED | OUTPATIENT
Start: 2024-05-17 | End: 2025-05-17

## 2024-05-28 ENCOUNTER — PATIENT OUTREACH (OUTPATIENT)
Dept: PRIMARY CARE | Facility: CLINIC | Age: 89
End: 2024-05-28
Payer: MEDICARE

## 2024-05-28 DIAGNOSIS — E78.5 HYPERLIPIDEMIA, UNSPECIFIED HYPERLIPIDEMIA TYPE: ICD-10-CM

## 2024-05-28 DIAGNOSIS — I10 PRIMARY HYPERTENSION: ICD-10-CM

## 2024-05-28 PROCEDURE — 99490 CHRNC CARE MGMT STAFF 1ST 20: CPT | Performed by: INTERNAL MEDICINE

## 2024-05-28 NOTE — PROGRESS NOTES
Chart reviewed prior to CCM outreach. Patient states she is doing fine right now. I went over my role with her once more and told her she can call my direct line if she has any questions or concerns, refills needed, or appointment issues. She sees Dr. Coelho in July. She did have a couple questions about her labs. She said she talked to Ana who said there were some minor discrepancies in her blood work but nothing concerning. I reiterated this, reading word for word Dr. Coelho's message.     In the hospital, she was told to start drinking 2 or 3 Ensure's every day to increase her protein intake. She has been doing that and is wondering if this is necessary or if she can stop drinking them. I will send this message to Dr. Coelho for advice.

## 2024-06-06 ENCOUNTER — PATIENT OUTREACH (OUTPATIENT)
Dept: PRIMARY CARE | Facility: CLINIC | Age: 89
End: 2024-06-06
Payer: MEDICARE

## 2024-06-06 DIAGNOSIS — E78.5 HYPERLIPIDEMIA, UNSPECIFIED HYPERLIPIDEMIA TYPE: ICD-10-CM

## 2024-06-06 DIAGNOSIS — I10 PRIMARY HYPERTENSION: ICD-10-CM

## 2024-06-06 NOTE — PROGRESS NOTES
Chart reviewed prior to CCM outreach. Patient states everything is fine right now. Last time I talked to her she was telling me the doctor from the hospital wanted her drinking 2 Ensure's a day to increase her protein level. She was wondering if Dr. Coelho still wanted her to be doing that. Dr. Coelho wants her drinking one a day so I let her know that and she has no problem doing that. She said everything else is fine right now and nothing is needed from me at this time.

## 2024-06-11 ENCOUNTER — APPOINTMENT (OUTPATIENT)
Dept: PODIATRY | Facility: CLINIC | Age: 89
End: 2024-06-11
Payer: MEDICARE

## 2024-06-16 DIAGNOSIS — I10 ESSENTIAL HYPERTENSION: ICD-10-CM

## 2024-06-19 RX ORDER — FUROSEMIDE 20 MG/1
10 TABLET ORAL DAILY
Qty: 45 TABLET | Refills: 3 | OUTPATIENT
Start: 2024-06-19

## 2024-06-19 NOTE — TELEPHONE ENCOUNTER
Med refill from pharmacy and patient approve    furosemide (Lasix) 20 mg tablet  10 mg, Daily           Summary: TAKE ONE-HALF TABLET BY   MOUTH ONCE DAILY,        [facility]

## 2024-06-25 ENCOUNTER — APPOINTMENT (OUTPATIENT)
Dept: PODIATRY | Facility: CLINIC | Age: 89
End: 2024-06-25
Payer: MEDICARE

## 2024-06-25 VITALS — TEMPERATURE: 97.4 F | DIASTOLIC BLOOD PRESSURE: 53 MMHG | HEART RATE: 74 BPM | SYSTOLIC BLOOD PRESSURE: 118 MMHG

## 2024-06-25 DIAGNOSIS — M79.671 RIGHT FOOT PAIN: ICD-10-CM

## 2024-06-25 DIAGNOSIS — B35.1 TINEA UNGUIUM: ICD-10-CM

## 2024-06-25 DIAGNOSIS — M79.674 TOE PAIN, RIGHT: ICD-10-CM

## 2024-06-25 DIAGNOSIS — M79.672 LEFT FOOT PAIN: ICD-10-CM

## 2024-06-25 DIAGNOSIS — M79.675 TOE PAIN, LEFT: ICD-10-CM

## 2024-06-25 DIAGNOSIS — L84 CORNS AND CALLOSITIES: Primary | ICD-10-CM

## 2024-06-25 PROCEDURE — 1036F TOBACCO NON-USER: CPT | Performed by: PODIATRIST

## 2024-06-25 PROCEDURE — 1159F MED LIST DOCD IN RCRD: CPT | Performed by: PODIATRIST

## 2024-06-25 PROCEDURE — 11721 DEBRIDE NAIL 6 OR MORE: CPT | Performed by: PODIATRIST

## 2024-06-25 PROCEDURE — 11056 PARNG/CUTG B9 HYPRKR LES 2-4: CPT | Performed by: PODIATRIST

## 2024-06-25 PROCEDURE — 1157F ADVNC CARE PLAN IN RCRD: CPT | Performed by: PODIATRIST

## 2024-06-25 PROCEDURE — 3074F SYST BP LT 130 MM HG: CPT | Performed by: PODIATRIST

## 2024-06-25 PROCEDURE — 3078F DIAST BP <80 MM HG: CPT | Performed by: PODIATRIST

## 2024-06-25 PROCEDURE — 1160F RVW MEDS BY RX/DR IN RCRD: CPT | Performed by: PODIATRIST

## 2024-06-25 NOTE — PROGRESS NOTES
CC: painful thickened and elongated toenails     HPI:  Pt presents complaining painful thickened and elongated toenails that are difficult to manage.  Onset was gradual with worsening course until recently.  Aggravated by shoe gear and ambulation.         PCP: Dr. Coelho  Last visit: 4-16-24     PMH  Medical History        Past Medical History:   Diagnosis Date    Abrasion, left lower leg, initial encounter 07/24/2021     Abrasion, left lower leg, initial encounter    Acute maxillary sinusitis, unspecified 02/12/2015     Acute maxillary sinusitis    Acute upper respiratory infection, unspecified 02/25/2013     Acute upper respiratory infection    Cystocele, unspecified (CODE) 09/25/2018     Vaginal wall prolapse    Dizziness and giddiness 10/12/2020     Light headed    Encounter for follow-up examination after completed treatment for conditions other than malignant neoplasm 10/12/2020     Hospital discharge follow-up    Encounter for follow-up examination after completed treatment for conditions other than malignant neoplasm 02/21/2017     Hospital discharge follow-up    Encounter for follow-up examination after completed treatment for conditions other than malignant neoplasm 03/25/2021     Hospital discharge follow-up    Encounter for follow-up examination after completed treatment for conditions other than malignant neoplasm 10/12/2020     Hospital discharge follow-up    Encounter for immunization 09/28/2012     Need for prophylactic vaccination and inoculation against influenza    Major depressive disorder, single episode, moderate (CMS/HCC) 04/19/2021     Current moderate episode of major depressive disorder    Menopausal and female climacteric states 05/05/2020     Postmenopausal syndrome    Other conditions influencing health status       Basal Cell Carcinoma Of The Eyelid    Other conditions influencing health status       History of cough    Other conditions influencing health status 12/07/2021     History  of cough    Other conditions influencing health status 02/11/2021     History of cough    Other conditions influencing health status 07/02/2013     Coronary Artery Disease    Other displaced fracture of upper end of left humerus, initial encounter for closed fracture 03/30/2021     Fracture of humeral head, left, closed    Other general symptoms and signs 12/21/2020     Cold intolerance    Other specified abnormal findings of blood chemistry 03/13/2022     Abnormal thyroid blood test    Other specified diseases of liver 10/05/2015     Liver cyst    Palpitations 04/27/2021     Intermittent palpitations    Paresthesia of skin 10/12/2020     Paresthesia of finger    Personal history of other (healed) physical injury and trauma 09/28/2012     History of strain of back    Personal history of other diseases of the digestive system 11/18/2015     History of gastroenteritis    Personal history of other diseases of the respiratory system 05/21/2016     History of sinusitis    Personal history of other drug therapy 09/19/2017     History of influenza vaccination    Personal history of other endocrine, nutritional and metabolic disease 11/18/2015     History of dehydration    Personal history of other infectious and parasitic diseases 01/18/2017     History of viral infection    Personal history of other infectious and parasitic diseases 12/01/2021     History of viral infection    Personal history of other specified conditions 03/09/2021     History of tachycardia    Personal history of other specified conditions 03/09/2021     History of fatigue    Personal history of other specified conditions 05/05/2020     History of insomnia    Personal history of other specified conditions 05/13/2020     History of nausea and vomiting    Personal history of other specified conditions 08/09/2021     History of palpitations    Pyuria 04/04/2021     Pyuria    Qualitative platelet defects (CMS/HCC)       Thrombocytopathy    Wedge  compression fracture of unspecified thoracic vertebra, initial encounter for closed fracture (CMS/[facility]) 05/05/2020     Compression fracture of thoracic vertebra    Wedge compression fracture of unspecified thoracic vertebra, initial encounter for closed fracture (CMS/[facility]) 07/02/2013     Compression fracture of thoracic vertebra         MEDS     Current Outpatient Medications:     acetaminophen (TylenoL) 325 mg tablet, Tylenol 325 MG Oral Tablet  Refills: 0     Active, Disp: , Rfl:     ALPRAZolam (Xanax) 0.25 mg tablet, Take 1 tablet (0.25 mg) by mouth once daily as needed for anxiety (once daily as needed for anxiety)., Disp: 90 tablet, Rfl: 0    amLODIPine (Norvasc) 5 mg tablet, Take 1 tablet (5 mg) by mouth 2 times a day., Disp: 180 tablet, Rfl: 3    amoxicillin (Amoxil) 500 mg capsule, TAKE FOUR CAPSULES BY MOUTH ONE HOUR BEFORE APPOINTMENT, Disp: , Rfl:     atorvastatin (Lipitor) 10 mg tablet, Take 0.5 tablets (5 mg) by mouth once daily., Disp: 90 tablet, Rfl: 3    bacitracin-polymyxin B (Polysporin) ophthalmic ointment, 1 Application 4 times a day., Disp: , Rfl:     benazepril (Lotensin) 40 mg tablet, TAKE 1 TABLET BY MOUTH TWICE  DAILY, Disp: 180 tablet, Rfl: 0    calcium carbonate 600 mg calcium (1,500 mg) tablet, Take 1 tablet (1,500 mg) by mouth 3 times a day., Disp: , Rfl:     cholecalciferol (Vitamin D-3) 25 MCG (1000 UT) capsule, TAKE 1 CAPSULE M, W,Fri, Sat, Disp: , Rfl:     clobetasol (Temovate) 0.05 % cream, Apply cream topically to affected areas on the trunk & extremities twice daily until clear, then as needed for flares cover with thick moisturizer, avoid groin, face, Disp: , Rfl:     estradiol (Estrace) 0.01 % (0.1 mg/gram) vaginal cream, , Disp: , Rfl:     famotidine (Pepcid) 20 mg tablet, Take 1 tablet (20 mg) by mouth 2 times a day., Disp: , Rfl:     fluticasone (Flonase) 50 mcg/actuation nasal spray, USE 1 SPRAY IN BOTH NOSTRILS  ONCE DAILY, Disp: 16 g, Rfl: 0    furosemide (Lasix) 20 mg  tablet, Take 0.5 tablets (10 mg) by mouth once daily., Disp: 90 tablet, Rfl: 3    ketoconazole (NIZOral) 2 % shampoo, WASH SCALP DAILY UNTIL CLEAR. THEN USE 2-3 TIMES A WEEK FOR MAINTENANCE, LET SIT FOR 3-5 MINUTES THEN RINSE, Disp: , Rfl:     mometasone (Elocon) 0.1 % cream, APPLY CREAM TOPICALLY TWICE DAILY TO AFFECTED AREA ON LEG UNTIL RESOLVED, TAPER AS DIRECTED, Disp: , Rfl:     nebivolol (Bystolic) 5 mg tablet, Take 1 tablet (5 mg) by mouth once daily., Disp: 90 tablet, Rfl: 3    neomycin-polymyxin-dexAMETHasone (Maxitrol) 3.5mg/mL-10,000 unit/mL-0.1 % ophthalmic suspension, , Disp: , Rfl:     nitroglycerin (Nitrostat) 0.4 mg SL tablet, Place 1 tablet (0.4 mg) under the tongue every 5 minutes if needed for chest pain. For up to 3 doses. Call 911 if pain persists., Disp: , Rfl:     oxyquinoline-sod.lauryl sulfat (Trimo-Stovall Jelly) 0.025-0.01 % gel, Insert into the vagina., Disp: , Rfl:     potassium chloride CR 20 mEq ER tablet, TAKE 1 TABLET BY MOUTH MONDAY,  WEDNESDAY, AND FRIDAY, Disp: 36 tablet, Rfl: 0    Premarin vaginal cream, USE 1G VAGINALLY ONCE DAILY AT BEDTIME AS DIRECTED, Disp: , Rfl:     psyllium (Metamucil) powder, Take by mouth once daily., Disp: , Rfl:     psyllium husk, with sugar, (Metamucil, with sugar,) 3.4 gram/12 gram powder, Take by mouth., Disp: , Rfl:     sertraline (Zoloft) 25 mg tablet, Take 1 tablet (25 mg) by mouth once daily., Disp: 30 tablet, Rfl: 5  Allergies        Allergies   Allergen Reactions    Erythromycin Diarrhea and Shortness of breath    Amlodipine Other       Ankle edema in higher doses    Cephalexin Unknown and Other       sore mouth    Hydralazine Unknown       Chest pain    Hydrochlorothiazide Unknown    Lisinopril-Hydrochlorothiazide Other    Nifedipine Unknown    Streptomycin Unknown    Tetracyclines Unknown    Valsartan Unknown      Social History   Social History            Socioeconomic History    Marital status:        Spouse name: None    Number of  children: None    Years of education: None    Highest education level: None   Occupational History    None   Tobacco Use    Smoking status: Former       Packs/day: 0.50       Years: 10.00       Additional pack years: 0.00       Total pack years: 5.00       Types: Cigarettes       Start date:        Quit date:        Years since quittin.2       Passive exposure: Never    Smokeless tobacco: Never   Substance and Sexual Activity    Alcohol use: Yes       Comment: once in a while    Drug use: Never    Sexual activity: None   Other Topics Concern    None   Social History Narrative    None      Social Determinants of Health      Financial Resource Strain: Not on file   Food Insecurity: Not on file   Transportation Needs: Not on file   Physical Activity: Not on file   Stress: Not on file   Social Connections: Not on file   Intimate Partner Violence: Not on file   Housing Stability: Not on file         Family History          Family History   Problem Relation Name Age of Onset    Coronary artery disease Mother        Hypertension Mother        Coronary artery disease Father             Surgical History         Past Surgical History:   Procedure Laterality Date    BREAST LUMPECTOMY   2012     Breast Surgery Lumpectomy    HERNIA REPAIR   2012     Hernia Repair    OTHER SURGICAL HISTORY   2012     Ovarian Cystectomy    OTHER SURGICAL HISTORY   2012     Reported Hx Of Eye Surgery For Cataracts    OTHER SURGICAL HISTORY   2014     Vaginal Surgery    OTHER SURGICAL HISTORY   2014     Vaginal Surgery    TOTAL ABDOMINAL HYSTERECTOMY   2012     Total Abdominal Hysterectomy            REVIEW OF SYSTEMS     DERM:   + as noted in HPI.         Physical examination:   On General Observation: Patient is a pleasant, cooperative, well developed 89 y.o.  adult female. The patient is alert and oriented to time, place and person. Patient has normal affect and mood.  /56   Pulse 80    Temp 36.7 °C (98.1 °F)      Vascular:  DP and PT pulses are 1/4 b/l.  mild edema noted. mild varicosities b/l.  CFT  6-7 seconds to all digits bilateral.  Skin temperature is warm to warm from proximal to distal bilateral.       Muscular: Strength is 5/5 for all instrinsic and extrinsic muscle groups.      Neuro:  Proprioception present.   Sensation to vibration is  present. Protective sensation present  at all pedal sites via [place] Bebe 5.07 monofilament bilateral.  Light touch present bilateral.      Derm:    Decreased hair growth b/l le  Left toenails: 1-5 Brittleness, crumbling upon debridement, subungual debris, elongation, mycotic appearance, tenderness, and thickness.   Right toenails: 1-5 Brittleness, crumbling upon debridement, subungual debris, elongation, mycotic appearance, tenderness, and thickness.   Callus is present plantar feet b/l le     ASSESSMENT:    Callus b/l feet  Pain feet  Tinea Unguium [B35.1]   Pain in right toe(s) [M79.674]   Pain in left toe(s) [M79.675]         PLAN:    -Debrided calluses plantar feet with sharp debridement  - Debrided toenails 1-10 in length and height.   - Follow up in 9-12 weeks.         Bethel Spencer DPM

## 2024-07-09 ENCOUNTER — PATIENT OUTREACH (OUTPATIENT)
Dept: PRIMARY CARE | Facility: CLINIC | Age: 89
End: 2024-07-09
Payer: MEDICARE

## 2024-07-09 DIAGNOSIS — I10 PRIMARY HYPERTENSION: ICD-10-CM

## 2024-07-09 DIAGNOSIS — E78.5 HYPERLIPIDEMIA, UNSPECIFIED HYPERLIPIDEMIA TYPE: ICD-10-CM

## 2024-07-09 NOTE — PROGRESS NOTES
Chart reviewed prior to CCM outreach. Patient states everything is going well right now. She says she is not in need of anything at this time but she does have a visit with Dr. Coelho in 1 week so if she thinks of anything she needs, she will address it at that time. I told her I would come meet her in person that day. Nothing else needed from me now.

## 2024-07-16 ENCOUNTER — PATIENT OUTREACH (OUTPATIENT)
Dept: PRIMARY CARE | Facility: CLINIC | Age: 89
End: 2024-07-16

## 2024-07-16 ENCOUNTER — APPOINTMENT (OUTPATIENT)
Dept: PRIMARY CARE | Facility: CLINIC | Age: 89
End: 2024-07-16
Payer: MEDICARE

## 2024-07-16 VITALS
DIASTOLIC BLOOD PRESSURE: 55 MMHG | WEIGHT: 122.4 LBS | SYSTOLIC BLOOD PRESSURE: 130 MMHG | HEIGHT: 62 IN | HEART RATE: 57 BPM | OXYGEN SATURATION: 94 % | BODY MASS INDEX: 22.52 KG/M2

## 2024-07-16 DIAGNOSIS — D64.9 ANEMIA, UNSPECIFIED TYPE: ICD-10-CM

## 2024-07-16 DIAGNOSIS — I10 PRIMARY HYPERTENSION: ICD-10-CM

## 2024-07-16 DIAGNOSIS — M15.9 PRIMARY OSTEOARTHRITIS INVOLVING MULTIPLE JOINTS: ICD-10-CM

## 2024-07-16 DIAGNOSIS — Z78.0 ASYMPTOMATIC MENOPAUSAL STATE: ICD-10-CM

## 2024-07-16 DIAGNOSIS — M85.89 OSTEOPENIA OF MULTIPLE SITES: Primary | ICD-10-CM

## 2024-07-16 DIAGNOSIS — E78.5 HYPERLIPIDEMIA, UNSPECIFIED HYPERLIPIDEMIA TYPE: ICD-10-CM

## 2024-07-16 DIAGNOSIS — I10 ESSENTIAL HYPERTENSION: ICD-10-CM

## 2024-07-16 DIAGNOSIS — E87.6 POTASSIUM (K) DEFICIENCY: ICD-10-CM

## 2024-07-16 DIAGNOSIS — M85.89 OSTEOPENIA OF MULTIPLE SITES: ICD-10-CM

## 2024-07-16 DIAGNOSIS — N39.46 MIXED INCONTINENCE: ICD-10-CM

## 2024-07-16 RX ORDER — AMLODIPINE BESYLATE 5 MG/1
5 TABLET ORAL 2 TIMES DAILY
Qty: 180 TABLET | Refills: 3 | Status: SHIPPED | OUTPATIENT
Start: 2024-07-16

## 2024-07-16 RX ORDER — POTASSIUM CHLORIDE 20 MEQ/1
20 TABLET, EXTENDED RELEASE ORAL
Qty: 36 TABLET | Refills: 3 | Status: SHIPPED | OUTPATIENT
Start: 2024-07-17

## 2024-07-16 RX ORDER — FUROSEMIDE 20 MG/1
10 TABLET ORAL DAILY
Qty: 45 TABLET | Refills: 3 | Status: SHIPPED | OUTPATIENT
Start: 2024-07-16

## 2024-07-16 RX ORDER — BENAZEPRIL HYDROCHLORIDE 40 MG/1
40 TABLET ORAL 2 TIMES DAILY
Qty: 180 TABLET | Refills: 3 | Status: SHIPPED | OUTPATIENT
Start: 2024-07-16

## 2024-07-16 ASSESSMENT — ENCOUNTER SYMPTOMS
FREQUENCY: 0
COUGH: 0
ARTHRALGIAS: 0
SHORTNESS OF BREATH: 0
DIARRHEA: 0
TROUBLE SWALLOWING: 0
FEVER: 0
DIZZINESS: 0
CONSTIPATION: 0
VOMITING: 0
FATIGUE: 0
DYSURIA: 0
ABDOMINAL PAIN: 0
SORE THROAT: 0
LIGHT-HEADEDNESS: 0
NAUSEA: 0
PALPITATIONS: 0

## 2024-07-16 ASSESSMENT — PAIN SCALES - GENERAL: PAINLEVEL: 8

## 2024-07-16 NOTE — ASSESSMENT & PLAN NOTE
Patient finished annual blood work in May and will remain on atorvastatin 10 mg daily.  Liver enzymes are normal

## 2024-07-16 NOTE — PROGRESS NOTES
Subjective   Patient ID: Gris Salcido is a 89 y.o. female who presents for No chief complaint on file..  Patient is here for a scheduled 3-month follow-up for her hypertension reflux symptoms arthritis and high cholesterol.  Patient mentions that she was reaching for a TV remote or something and had half of a fall from the couch to the arm of the couch in a coffee table.  She bruised her left breast, left chest wall and left shoulder.  She also has a small bruise at the tip of her chin where it scuffed onto the hard part of the furniture.  She says it was not more of a full fall as she was not standing at the time it was more of a sliding out of the chair issue.  She has a little bit of pain and tenderness in her left breast area where the bruising is but nothing significant.  Patient is accompanied by her granddaughter today.        Review of Systems   Constitutional:  Negative for fatigue and fever.   HENT:  Negative for sore throat and trouble swallowing.    Eyes:  Negative for visual disturbance.   Respiratory:  Negative for cough and shortness of breath.    Cardiovascular:  Negative for chest pain, palpitations and leg swelling.   Gastrointestinal:  Negative for abdominal pain, constipation, diarrhea, nausea and vomiting.   Genitourinary:  Negative for dysuria and frequency.   Musculoskeletal:  Negative for arthralgias.   Skin:  Negative for rash.   Neurological:  Negative for dizziness and light-headedness.       Objective   Medication Documentation Review Audit       Reviewed by Noa Coelho MD (Physician) on 07/16/24 at 1435      Medication Order Taking? Sig Documenting Provider Last Dose Status   acetaminophen (TylenoL) 325 mg tablet [id number] No Tylenol 325 MG Oral Tablet   Refills: 0       Active Historical Provider, MD Taking Active   ALPRAZolam (Xanax) 0.25 mg tablet [id number] No Take 1 tablet (0.25 mg) by mouth once daily as needed for anxiety (once daily as needed for anxiety). Noa Coelho MD  Taking Active   amLODIPine (Norvasc) 5 mg tablet [id number]  TAKE 1 TABLET BY MOUTH TWICE  DAILY Noa Coelho MD  Active   amoxicillin (Amoxil) 500 mg capsule [id number] No TAKE FOUR CAPSULES BY MOUTH ONE HOUR BEFORE APPOINTMENT Historical Provider, MD Taking Active   atorvastatin (Lipitor) 10 mg tablet [id number] No Take 0.5 tablets (5 mg) by mouth once daily. Noa Coelho MD Taking Active   bacitracin-polymyxin B (Polysporin) ophthalmic ointment [id number] No 1 Application 4 times a day. Historical Provider, MD Taking Active   benazepril (Lotensin) 40 mg tablet [id number]  TAKE 1 TABLET BY MOUTH TWICE  DAILY Noa Coelho MD  Active   calcium carbonate 600 mg calcium (1,500 mg) tablet [id number] No Take 1 tablet (1,500 mg) by mouth 3 times a day. Historical Provider, MD Taking Active   cholecalciferol (Vitamin D-3) 25 MCG (1000 UT) capsule [id number] No TAKE 1 CAPSULE M, W,Fri, Sat Historical Provider, MD Taking Active   clobetasol (Temovate) 0.05 % cream [id number] No Apply cream topically to affected areas on the trunk & extremities twice daily until clear, then as needed for flares cover with thick moisturizer, avoid groin, face Historical Provider, MD Taking Active   estradiol (Estrace) 0.01 % (0.1 mg/gram) vaginal cream [id number] No  Historical Provider, MD Taking Active   famotidine (Pepcid) 20 mg tablet [id number] No Take 1 tablet (20 mg) by mouth 2 times a day. Historical Provider, MD Taking Active   fluticasone (Flonase) 50 mcg/actuation nasal spray [id number] No USE 1 SPRAY IN BOTH NOSTRILS  ONCE DAILY Noa Coelho MD Taking Active   furosemide (Lasix) 20 mg tablet [id number] No TAKE ONE-HALF TABLET BY MOUTH  ONCE DAILY Noa Coelho MD Taking Active   ketoconazole (NIZOral) 2 % shampoo [id number] No WASH SCALP DAILY UNTIL CLEAR. THEN USE 2-3 TIMES A WEEK FOR MAINTENANCE, LET SIT FOR 3-5 MINUTES THEN RINSE Historical Provider, MD Taking Active   mometasone (Elocon) 0.1 % cream [id number] No APPLY CREAM TOPICALLY  TWICE DAILY TO AFFECTED AREA ON LEG UNTIL RESOLVED, TAPER AS DIRECTED Historical Provider, MD Taking Active   nebivolol (Bystolic) 5 mg tablet [id number]  Take 1 tablet (5 mg) by mouth once daily. James C Ramicone,   Active   neomycin-polymyxin-dexAMETHasone (Maxitrol) 3.5mg/mL-10,000 unit/mL-0.1 % ophthalmic suspension [id number] No  Historical Provider, MD Taking Active   nitroglycerin (Nitrostat) 0.4 mg SL tablet [id number] No Place 1 tablet (0.4 mg) under the tongue every 5 minutes if needed for chest pain. For up to 3 doses. Call 911 if pain persists. Historical Provider, MD Taking Active   oxyquinoline-sod.lauryl sulfat (Trimo-Stovall Jelly) 0.025-0.01 % gel [id number] No Insert into the vagina. Historical Provider, MD Taking Active   potassium chloride CR 20 mEq ER tablet [id number]  TAKE 1 TABLET BY MOUTH ON  MONDAY, WEDNESDAY, AND FRIDAY Noa Coelho MD  Active   Premarin vaginal cream [id number] No USE 1G VAGINALLY ONCE DAILY AT BEDTIME AS DIRECTED Historical Provider, MD Taking Active   psyllium (Metamucil) powder [id number] No Take by mouth once daily. Historical Provider, MD Taking Active   psyllium husk, with sugar, (Metamucil, with sugar,) 3.4 gram/12 gram powder [id number] No Take by mouth. Historical Provider, MD Taking Active   sertraline (Zoloft) 25 mg tablet [id number] No Take 1 tablet (25 mg) by mouth once daily. Noa Coelho MD Taking  24 1823                  Allergies   Allergen Reactions    Erythromycin Diarrhea and Shortness of breath    Amlodipine Other     Ankle edema in higher doses    Cephalexin Unknown and Other     sore mouth    Hydralazine Unknown     Chest pain    Hydrochlorothiazide Unknown    Lisinopril-Hydrochlorothiazide Other    Nifedipine Unknown    Streptomycin Unknown    Tetracyclines Unknown    Valsartan Unknown     Physical Exam  Constitutional:       Appearance: Normal appearance.   HENT:      Head: Normocephalic and atraumatic.      Nose: Nose normal.   Eyes:       "Extraocular Movements: Extraocular movements intact.      Pupils: Pupils are equal, round, and reactive to light.   Cardiovascular:      Rate and Rhythm: Normal rate and regular rhythm.   Pulmonary:      Breath sounds: Normal breath sounds.      Comments: Patient has a small dime size bruise over the left anterior breast and in the midsternal area.  She has a bruise over her left elbow also purple to green in color.  And she has 2 small dime sized bruises in her left anterior shoulder also purple to green in color.  Finally she has a purple to green bruise approximately 1 and half by 1 cm at the tip of her chin.  All of these are from when she slid off the couch and did not actually \"fall\" according to the patient she has a little discomfort over the chest wall so as specific exam was performed here but there was no POF no abnormal breath sounds and nothing other than the superficial bruising.  Believe that she has any rib fractures as even with significant palpation she did not complain of any bad pain.  Abdominal:      General: Abdomen is flat. Bowel sounds are normal.      Palpations: Abdomen is soft.   Musculoskeletal:      Right lower leg: No edema.      Left lower leg: No edema.   Neurological:      Mental Status: She is alert.     /55 (BP Location: Right arm, Patient Position: Sitting)   Pulse 57   Ht 1.562 m (5' 1.5\")   Wt 55.5 kg (122 lb 6.4 oz)   SpO2 94%   BMI 22.75 kg/m²       Assessment/Plan   Problem List Items Addressed This Visit       Osteoarthritis    Mixed incontinence    Hyperlipidemia     Patient finished annual blood work in May and will remain on atorvastatin 10 mg daily.  Liver enzymes are normal         Osteopenia - Primary     Patient is due for next bone density now was given an order         Relevant Orders    XR DEXA bone density    Hypertension     Blood pressure stable and well-controlled.         Relevant Medications    benazepril (Lotensin) 40 mg tablet    amLODIPine " (Norvasc) 5 mg tablet    Anemia     Other Visit Diagnoses       Essential hypertension        Relevant Medications    furosemide (Lasix) 20 mg tablet    Potassium (K) deficiency        Relevant Medications    potassium chloride CR 20 mEq ER tablet (Start on 7/17/2024)    Asymptomatic menopausal state        Relevant Orders    XR DEXA bone density                   It has been a pleasure seeing you.  Noa Coelho MD

## 2024-07-16 NOTE — PROGRESS NOTES
Chart reviewed prior to [facility] outreach. Patient is seeing Dr. Coelho today. Patient states she was trying to hold herself up by her left arm while reaching for something on the couch and her arm gave out and she slipped and hit her chin on the couch and her chest/arm on the armrest. Patient has a sore on her chin from this. Nothing else going on right now. Dr. Coelho ordered a bone density exam for the patient to have done.

## 2024-07-22 ENCOUNTER — TELEPHONE (OUTPATIENT)
Dept: PRIMARY CARE | Facility: CLINIC | Age: 89
End: 2024-07-22
Payer: MEDICARE

## 2024-07-22 NOTE — TELEPHONE ENCOUNTER
Patient with her daughter Yolanda asking if she can get a letter to take to post office for her mail to be delivered directly to her door. She is not able to walk to where her p.o. box is. Asking if it can be mailed to patient and she and family member will take it to post office.  [phone]

## 2024-07-25 ENCOUNTER — APPOINTMENT (OUTPATIENT)
Dept: RADIOLOGY | Facility: CLINIC | Age: 89
End: 2024-07-25
Payer: MEDICARE

## 2024-08-07 ENCOUNTER — PATIENT OUTREACH (OUTPATIENT)
Dept: PRIMARY CARE | Facility: CLINIC | Age: 89
End: 2024-08-07
Payer: MEDICARE

## 2024-08-07 DIAGNOSIS — I10 PRIMARY HYPERTENSION: ICD-10-CM

## 2024-08-07 DIAGNOSIS — M85.89 OSTEOPENIA OF MULTIPLE SITES: ICD-10-CM

## 2024-08-07 DIAGNOSIS — E78.5 HYPERLIPIDEMIA, UNSPECIFIED HYPERLIPIDEMIA TYPE: ICD-10-CM

## 2024-08-07 NOTE — PROGRESS NOTES
Chart reviewed prior to [facility] outreach. Patient states everything is going well right now. She said this has been going on for awhile now but she gets very drowsy during the day due to her medications so she rests a lot of the day. She said she hasn't really been feeling very great lately. I asked her how and she just said in general she has not been feeling great. She has her bone density test tomorrow. She was concerned about the medication she would be put on or advised to take if it turns out she has osteoporosis. I told her it is hard to say which medication she would take because there are a few. She has an appointment with Dr. Coelho in November. Nothing needed at this time.

## 2024-08-08 ENCOUNTER — HOSPITAL ENCOUNTER (OUTPATIENT)
Dept: RADIOLOGY | Facility: CLINIC | Age: 89
Discharge: HOME | End: 2024-08-08
Payer: MEDICARE

## 2024-08-08 ENCOUNTER — TELEPHONE (OUTPATIENT)
Dept: PRIMARY CARE | Facility: CLINIC | Age: 89
End: 2024-08-08
Payer: MEDICARE

## 2024-08-08 DIAGNOSIS — M85.89 OSTEOPENIA OF MULTIPLE SITES: ICD-10-CM

## 2024-08-08 DIAGNOSIS — Z78.0 ASYMPTOMATIC MENOPAUSAL STATE: ICD-10-CM

## 2024-08-08 PROCEDURE — 77080 DXA BONE DENSITY AXIAL: CPT

## 2024-08-08 ASSESSMENT — LIFESTYLE VARIABLES
3_OR_MORE_DRINKS_PER_DAY: N
CURRENT_SMOKER: N

## 2024-08-08 NOTE — TELEPHONE ENCOUNTER
----- Message from Noa Coelho sent at 8/8/2024  3:49 PM EDT -----  Notify patient she has osteopenia on her dxa or bone density.  This is a precursor to osteoporosis.  She should take a minimum of 2000iu of VitD3 OTC every day and a minimum of 500mg of calcium 3 times a day.  Dr Coelho recommends gummie calcium chews so they are easier to take after each meal.

## 2024-08-19 ENCOUNTER — TELEPHONE (OUTPATIENT)
Dept: PRIMARY CARE | Facility: CLINIC | Age: 89
End: 2024-08-19
Payer: MEDICARE

## 2024-09-06 ENCOUNTER — DOCUMENTATION (OUTPATIENT)
Dept: PRIMARY CARE | Facility: CLINIC | Age: 89
End: 2024-09-06
Payer: MEDICARE

## 2024-09-06 ENCOUNTER — PATIENT OUTREACH (OUTPATIENT)
Dept: PRIMARY CARE | Facility: CLINIC | Age: 89
End: 2024-09-06
Payer: MEDICARE

## 2024-09-06 DIAGNOSIS — I47.19 ATRIAL TACHYCARDIA (CMS-HCC): ICD-10-CM

## 2024-09-06 NOTE — PROGRESS NOTES
Discharge Facility: [facility]  Discharge Diagnosis: Palpitations/A-fib  Admission Date: 9/3/24  Discharge Date: 9/5/24    PCP Appointment Date: 9/12/24  Specialist Appointment Date: Needs with Cardiology  Hospital Encounter and Summary Linked: Yes  See discharge assessment below for further details        Engagement  Call Start Time: 1101 (9/6/2024 11:11 AM)    Medications  Medications reviewed with patient/caregiver?: Yes (9/6/2024 11:11 AM)  Is the patient having any side effects they believe may be caused by any medication additions or changes?: No (9/6/2024 11:11 AM)  Does the patient have all medications ordered at discharge?: Yes (9/6/2024 11:11 AM)  Care Management Interventions: No intervention needed (9/6/2024 11:11 AM)  Prescription Comments: Patient was put on Eliquis for a-fib. (9/6/2024 11:11 AM)  Is the patient taking all medications as directed (includes completed medication regime)?: Yes (9/6/2024 11:11 AM)  Care Management Interventions: Provided patient education (9/6/2024 11:11 AM)    Appointments  Does the patient have a primary care provider?: Yes (9/6/2024 11:11 AM)  Care Management Interventions: Verified appointment date/time/provider (9/6/2024 11:11 AM)  Has the patient kept scheduled appointments due by today?: Yes (9/6/2024 11:11 AM)  Care Management Interventions: Advised patient to keep appointment (9/6/2024 11:11 AM)    Self Management  What is the home health agency?: None (9/6/2024 11:11 AM)  What Durable Medical Equipment (DME) was ordered?: None (9/6/2024 11:11 AM)    Patient Teaching  Does the patient have access to their discharge instructions?: Yes (9/6/2024 11:11 AM)  Care Management Interventions: Reviewed instructions with patient; Educated on MyChart (9/6/2024 11:11 AM)  What is the patient's perception of their health status since discharge?: Improving (9/6/2024 11:11 AM)  Is the patient/caregiver able to teach back the hierarchy of who to call/visit for  symptoms/problems? PCP, Specialist, Home Health nurse, Urgent Care, ED, 911: Yes (9/6/2024 11:11 AM)  Patient/Caregiver Education Comments: Patient says her son is coming to stay with her for a few days to help her get back on her feet. (9/6/2024 11:11 AM)    Wrap Up  Is the patient/caregiver familiar with Advance Care Planning?: Yes (9/6/2024 11:11 AM)  Would the patient like more information on Advance Care Planning?: No (9/6/2024 11:11 AM)  Wrap Up Additional Comments: Patient says she is feeling better but she is feeling weak. She did not get up much in the hospital so being in bed for 2 days straight has left her feeling weak. She is trying to get up and move as much as she can at home. (9/6/2024 11:11 AM)  Call End Time: 1111 (9/6/2024 11:11 AM)        Patient went to hospital for palpitations. They did an EKG which showed atrial fibrillation which converted to normal sinus rhythm spontaneously. Patient had an echo done which showed no significant abnormalities. Patient was started on Eliquis 2.5mg twice daily and her nebivolol was increased to 10mg once daily. She is going to follow up with cardiology and wear a Holter monitor for a week. Since being discharged, she feels weak but feels better than when she went to the hospital.

## 2024-09-10 ENCOUNTER — HOSPITAL ENCOUNTER (OUTPATIENT)
Dept: CARDIOLOGY | Facility: CLINIC | Age: 89
Discharge: HOME | End: 2024-09-10
Payer: MEDICARE

## 2024-09-10 DIAGNOSIS — I47.19 ATRIAL TACHYCARDIA (CMS-HCC): ICD-10-CM

## 2024-09-12 ENCOUNTER — LAB (OUTPATIENT)
Dept: LAB | Facility: LAB | Age: 89
End: 2024-09-12
Payer: MEDICARE

## 2024-09-12 ENCOUNTER — APPOINTMENT (OUTPATIENT)
Dept: PRIMARY CARE | Facility: CLINIC | Age: 89
End: 2024-09-12
Payer: MEDICARE

## 2024-09-12 VITALS
OXYGEN SATURATION: 96 % | DIASTOLIC BLOOD PRESSURE: 66 MMHG | HEART RATE: 67 BPM | SYSTOLIC BLOOD PRESSURE: 147 MMHG | WEIGHT: 119 LBS | HEIGHT: 62 IN | BODY MASS INDEX: 21.9 KG/M2

## 2024-09-12 DIAGNOSIS — I48.0 PAROXYSMAL ATRIAL FIBRILLATION (MULTI): ICD-10-CM

## 2024-09-12 DIAGNOSIS — Z23 FLU VACCINE NEED: ICD-10-CM

## 2024-09-12 DIAGNOSIS — Z09 HOSPITAL DISCHARGE FOLLOW-UP: ICD-10-CM

## 2024-09-12 DIAGNOSIS — I10 PRIMARY HYPERTENSION: ICD-10-CM

## 2024-09-12 DIAGNOSIS — I47.19 ATRIAL TACHYCARDIA (CMS-HCC): ICD-10-CM

## 2024-09-12 DIAGNOSIS — E87.6 HYPOKALEMIA: ICD-10-CM

## 2024-09-12 DIAGNOSIS — E87.1 HYPONATREMIA: ICD-10-CM

## 2024-09-12 DIAGNOSIS — K21.9 GASTROESOPHAGEAL REFLUX DISEASE WITHOUT ESOPHAGITIS: ICD-10-CM

## 2024-09-12 DIAGNOSIS — E87.1 HYPONATREMIA: Primary | ICD-10-CM

## 2024-09-12 PROBLEM — N39.0 URINARY TRACT INFECTION: Status: ACTIVE | Noted: 2024-09-03

## 2024-09-12 PROCEDURE — 80048 BASIC METABOLIC PNL TOTAL CA: CPT

## 2024-09-12 PROCEDURE — G0008 ADMIN INFLUENZA VIRUS VAC: HCPCS | Performed by: INTERNAL MEDICINE

## 2024-09-12 PROCEDURE — 99496 TRANSJ CARE MGMT HIGH F2F 7D: CPT | Performed by: INTERNAL MEDICINE

## 2024-09-12 PROCEDURE — 3077F SYST BP >= 140 MM HG: CPT | Performed by: INTERNAL MEDICINE

## 2024-09-12 PROCEDURE — 1036F TOBACCO NON-USER: CPT | Performed by: INTERNAL MEDICINE

## 2024-09-12 PROCEDURE — 36415 COLL VENOUS BLD VENIPUNCTURE: CPT

## 2024-09-12 PROCEDURE — 3078F DIAST BP <80 MM HG: CPT | Performed by: INTERNAL MEDICINE

## 2024-09-12 PROCEDURE — 1157F ADVNC CARE PLAN IN RCRD: CPT | Performed by: INTERNAL MEDICINE

## 2024-09-12 PROCEDURE — 90662 IIV NO PRSV INCREASED AG IM: CPT | Performed by: INTERNAL MEDICINE

## 2024-09-12 PROCEDURE — 1126F AMNT PAIN NOTED NONE PRSNT: CPT | Performed by: INTERNAL MEDICINE

## 2024-09-12 RX ORDER — NEBIVOLOL 10 MG/1
10 TABLET ORAL DAILY
Qty: 90 TABLET | Refills: 3 | Status: SHIPPED | OUTPATIENT
Start: 2024-09-12 | End: 2025-09-12

## 2024-09-12 ASSESSMENT — ENCOUNTER SYMPTOMS
DYSURIA: 0
COUGH: 0
LIGHT-HEADEDNESS: 0
PALPITATIONS: 0
FEVER: 0
VOMITING: 0
DIZZINESS: 0
TROUBLE SWALLOWING: 0
SHORTNESS OF BREATH: 0
ABDOMINAL PAIN: 0
CONSTIPATION: 0
NAUSEA: 0
DIARRHEA: 0
FREQUENCY: 0
SORE THROAT: 0
ARTHRALGIAS: 0
FATIGUE: 0

## 2024-09-12 ASSESSMENT — PATIENT HEALTH QUESTIONNAIRE - PHQ9
2. FEELING DOWN, DEPRESSED OR HOPELESS: NOT AT ALL
SUM OF ALL RESPONSES TO PHQ9 QUESTIONS 1 AND 2: 0
1. LITTLE INTEREST OR PLEASURE IN DOING THINGS: NOT AT ALL

## 2024-09-12 ASSESSMENT — PAIN SCALES - GENERAL: PAINLEVEL: 0-NO PAIN

## 2024-09-12 NOTE — ASSESSMENT & PLAN NOTE
Patient has a history of atrial tachycardia and now possibly A-fib with RVR.  She is currently wearing a 7-day monitor and will be followed up by her cardiologist Dr Ramicone

## 2024-09-12 NOTE — ASSESSMENT & PLAN NOTE
Patient is here for hospital follow-up.  She was hospitalized September 3 through 5 for A-fib with RVR.  She now is on Eliquis 2.5 mg twice daily and Bystolic was increased to 10 mg dosage.  While in the hospital she also had some abnormal electrolytes so I will repeat a BMP today to check her sodium and potassium    Overall patient says she feels fine except she is a little bit more tired and rundown which is to be expected at her advanced age of 89  Patient denies feeling lightheaded or faint    Patient will receive a flu shot today    Patient also has a follow-up with her gastroenterologist.  She has been having constipation issues but these may actually have improved since her pessary was decreased in size and her bowel movements are moving much more easily now    Finally she was scheduled for cardiology follow-up at the [facility] I told her she could cancel that appointment since she is already seeing 1 at

## 2024-09-12 NOTE — ASSESSMENT & PLAN NOTE
Blood pressure was also running high in the hospital therefore her Bystolic was increased to 10 mg dose to help both with the rapid heart rate as well as the blood pressure.  Med list was updated appropriately today

## 2024-09-12 NOTE — PATIENT INSTRUCTIONS
Get blood work today    OK to cxl appointment on 10/14 with Dr Felipe [facility] cardio  Call [facility] at [phone] and ask for his office      Keep appointment with Dr Coelho on 11/7/24

## 2024-09-12 NOTE — PROGRESS NOTES
Subjective   Patient ID: Gris Salcido is a 89 y.o. female who presents for   Patient is here for hospital follow-up.  She was in [facility] September 3 through September 5.  She was diagnosed with paroxysmal A-fib with RVR.  There is some question as to whether or not it was truly A-fib or not.  Patient is currently wearing a 7-day cardiac monitor ordered by her cardiologist Dr Ramicone    While in the hospital she had both hyponatremia and hypokalemia so we will repeat labs today    Finally patient is now on Eliquis 2.5 mg twice daily and her med list was updated appropriately.        Review of Systems   Constitutional:  Negative for fatigue and fever.   HENT:  Negative for sore throat and trouble swallowing.    Eyes:  Negative for visual disturbance.   Respiratory:  Negative for cough and shortness of breath.    Cardiovascular:  Negative for chest pain, palpitations and leg swelling.   Gastrointestinal:  Negative for abdominal pain, constipation, diarrhea, nausea and vomiting.   Genitourinary:  Negative for dysuria and frequency.   Musculoskeletal:  Negative for arthralgias.   Skin:  Negative for rash.   Neurological:  Negative for dizziness and light-headedness.       Objective   Medication Documentation Review Audit       Reviewed by Noa Coelho MD (Physician) on 07/16/24 at 1435      Medication Order Taking? Sig Documenting Provider Last Dose Status   acetaminophen (TylenoL) 325 mg tablet [id number] No Tylenol 325 MG Oral Tablet   Refills: 0       Active Historical Provider, MD Taking Active   ALPRAZolam (Xanax) 0.25 mg tablet [id number] No Take 1 tablet (0.25 mg) by mouth once daily as needed for anxiety (once daily as needed for anxiety). Noa Coelho MD Taking Active   amLODIPine (Norvasc) 5 mg tablet [id number]  TAKE 1 TABLET BY MOUTH TWICE  DAILY Noa Coelho MD  Active   amoxicillin (Amoxil) 500 mg capsule [id number] No TAKE FOUR CAPSULES BY MOUTH ONE HOUR BEFORE APPOINTMENT Historical Provider,  MD Taking Active   atorvastatin (Lipitor) 10 mg tablet [id number] No Take 0.5 tablets (5 mg) by mouth once daily. Noa Coelho MD Taking Active   bacitracin-polymyxin B (Polysporin) ophthalmic ointment [id number] No 1 Application 4 times a day. Historical Provider, MD Taking Active   benazepril (Lotensin) 40 mg tablet [id number]  TAKE 1 TABLET BY MOUTH TWICE  DAILY Noa Coelho MD  Active   calcium carbonate 600 mg calcium (1,500 mg) tablet [id number] No Take 1 tablet (1,500 mg) by mouth 3 times a day. Historical Provider, MD Taking Active   cholecalciferol (Vitamin D-3) 25 MCG (1000 UT) capsule [id number] No TAKE 1 CAPSULE M, W,Fri, Sat Historical Provider, MD Taking Active   clobetasol (Temovate) 0.05 % cream [id number] No Apply cream topically to affected areas on the trunk & extremities twice daily until clear, then as needed for flares cover with thick moisturizer, avoid groin, face Historical Provider, MD Taking Active   estradiol (Estrace) 0.01 % (0.1 mg/gram) vaginal cream [id number] No  Historical Provider, MD Taking Active   famotidine (Pepcid) 20 mg tablet [id number] No Take 1 tablet (20 mg) by mouth 2 times a day. Historical Provider, MD Taking Active   fluticasone (Flonase) 50 mcg/actuation nasal spray [id number] No USE 1 SPRAY IN BOTH NOSTRILS  ONCE DAILY Noa Coelho MD Taking Active   furosemide (Lasix) 20 mg tablet [id number] No TAKE ONE-HALF TABLET BY MOUTH  ONCE DAILY Noa Coelho MD Taking Active   ketoconazole (NIZOral) 2 % shampoo [id number] No WASH SCALP DAILY UNTIL CLEAR. THEN USE 2-3 TIMES A WEEK FOR MAINTENANCE, LET SIT FOR 3-5 MINUTES THEN RINSE Historical Provider, MD Taking Active   mometasone (Elocon) 0.1 % cream [id number] No APPLY CREAM TOPICALLY TWICE DAILY TO AFFECTED AREA ON LEG UNTIL RESOLVED, TAPER AS DIRECTED Historical Provider, MD Taking Active   nebivolol (Bystolic) 5 mg tablet [id number]  Take 1 tablet (5 mg) by mouth once daily. James C Ramicone,   Active  "  neomycin-polymyxin-dexAMETHasone (Maxitrol) 3.5mg/mL-10,000 unit/mL-0.1 % ophthalmic suspension [id number] No  Historical Provider, MD Taking Active   nitroglycerin (Nitrostat) 0.4 mg SL tablet [id number] No Place 1 tablet (0.4 mg) under the tongue every 5 minutes if needed for chest pain. For up to 3 doses. Call 911 if pain persists. Historical Provider, MD Taking Active   oxyquinoline-sod.lauryl sulfat (Trimo-Stovall Jelly) 0.025-0.01 % gel [id number] No Insert into the vagina. Historical Provider, MD Taking Active   potassium chloride CR 20 mEq ER tablet [id number]  TAKE 1 TABLET BY MOUTH ON  MONDAY, WEDNESDAY, AND FRIDAY Noa Coelho MD  Active   Premarin vaginal cream [id number] No USE 1G VAGINALLY ONCE DAILY AT BEDTIME AS DIRECTED Historical Provider, MD Taking Active   psyllium (Metamucil) powder [id number] No Take by mouth once daily. Historical Provider, MD Taking Active   psyllium husk, with sugar, (Metamucil, with sugar,) 3.4 gram/12 gram powder [id number] No Take by mouth. Historical Provider, MD Taking Active   sertraline (Zoloft) 25 mg tablet [id number] No Take 1 tablet (25 mg) by mouth once daily. Noa Coelho MD Taking  24 9075                  Allergies   Allergen Reactions    Erythromycin Diarrhea and Shortness of breath    Amlodipine Other     Ankle edema in higher doses    Cephalexin Unknown and Other     sore mouth    Hydralazine Unknown     Chest pain    Hydrochlorothiazide Unknown    Lisinopril-Hydrochlorothiazide Other    Nifedipine Unknown    Streptomycin Unknown    Tetracyclines Unknown    Valsartan Unknown       /66   Pulse 67   Ht 1.562 m (5' 1.5\")   Wt 54 kg (119 lb)   SpO2 96%   BMI 22.12 kg/m²     Physical Exam  Constitutional:       Appearance: Normal appearance.   HENT:      Head: Normocephalic and atraumatic.      Nose: Nose normal.   Eyes:      Extraocular Movements: Extraocular movements intact.      Pupils: Pupils are equal, round, and reactive to light. "   Cardiovascular:      Rate and Rhythm: Normal rate and regular rhythm.   Pulmonary:      Breath sounds: Normal breath sounds.   Abdominal:      General: Abdomen is flat. Bowel sounds are normal.      Palpations: Abdomen is soft.   Musculoskeletal:      Right lower leg: No edema.      Left lower leg: No edema.   Neurological:      Mental Status: She is alert.           Assessment/Plan   Problem List Items Addressed This Visit       Atrial tachycardia (CMS-HCC)     Patient has a history of atrial tachycardia and now possibly A-fib with RVR.  She is currently wearing a 7-day monitor and will be followed up by her cardiologist Dr Ramicone         Relevant Medications    nebivolol (Bystolic) 10 mg tablet    Esophageal reflux    Hypertension     Blood pressure was also running high in the hospital therefore her Bystolic was increased to 10 mg dose to help both with the rapid heart rate as well as the blood pressure.  Med list was updated appropriately today         Hospital discharge follow-up     Patient is here for hospital follow-up.  She was hospitalized September 3 through 5 for A-fib with RVR.  She now is on Eliquis 2.5 mg twice daily and Bystolic was increased to 10 mg dosage.  While in the hospital she also had some abnormal electrolytes so I will repeat a BMP today to check her sodium and potassium    Overall patient says she feels fine except she is a little bit more tired and rundown which is to be expected at her advanced age of 89  Patient denies feeling lightheaded or faint    Patient will receive a flu shot today    Patient also has a follow-up with her gastroenterologist.  She has been having constipation issues but these may actually have improved since her pessary was decreased in size and her bowel movements are moving much more easily now    Finally she was scheduled for cardiology follow-up at the [facility] I told her she could cancel that appointment since she is already seeing 1 at           Hypokalemia    Relevant Orders    Basic metabolic panel    Paroxysmal atrial fibrillation (Multi)    Relevant Medications    nebivolol (Bystolic) 10 mg tablet     Other Visit Diagnoses       Hyponatremia    -  Primary    Relevant Orders    Basic metabolic panel    Flu vaccine need        Relevant Orders    Flu vaccine, trivalent, preservative free, HIGH-DOSE, age 65y+ (Fluzone) (Completed)                   It has been a pleasure seeing you.  Noa Coelho MD

## 2024-09-13 ENCOUNTER — PATIENT OUTREACH (OUTPATIENT)
Dept: PRIMARY CARE | Facility: CLINIC | Age: 89
End: 2024-09-13
Payer: MEDICARE

## 2024-09-13 DIAGNOSIS — I10 PRIMARY HYPERTENSION: ICD-10-CM

## 2024-09-13 DIAGNOSIS — K21.9 GASTROESOPHAGEAL REFLUX DISEASE WITHOUT ESOPHAGITIS: ICD-10-CM

## 2024-09-13 LAB
ANION GAP SERPL CALC-SCNC: 14 MMOL/L (ref 10–20)
BUN SERPL-MCNC: 26 MG/DL (ref 6–23)
CALCIUM SERPL-MCNC: 9.9 MG/DL (ref 8.6–10.6)
CHLORIDE SERPL-SCNC: 94 MMOL/L (ref 98–107)
CO2 SERPL-SCNC: 27 MMOL/L (ref 21–32)
CREAT SERPL-MCNC: 1.03 MG/DL (ref 0.5–1.05)
EGFRCR SERPLBLD CKD-EPI 2021: 52 ML/MIN/1.73M*2
GLUCOSE SERPL-MCNC: 96 MG/DL (ref 74–99)
POTASSIUM SERPL-SCNC: 4.1 MMOL/L (ref 3.5–5.3)
SODIUM SERPL-SCNC: 131 MMOL/L (ref 136–145)

## 2024-09-13 NOTE — PROGRESS NOTES
This is a TCM follow-up call. Patient saw Dr. Coelho yesterday. Patient states she is feeling really weak still but does not feel she is having afib. Patient has a Holter monitor that she will wear until Tuesday. Her cardiologist is Ramicone. She will follow up with him. Patient states she is not in need of anything at this time.

## 2024-09-13 NOTE — PROGRESS NOTES
Discussed cardiology appointment and holter monitor. Patient will have the monitor on for 7 days and will get it off next Tuesday. Patient sees Dr. Ramicone for cardiology. Patient saw Dr. Coelho yesterday. She said the visit went well. No new medications. Patient was put on Eliquis when she was at the hospital with a-fib. Patient's nebivolol dose increased as well because blood pressure was elevated when in the hospital. She will see Dr. Coelho in November. Nothing needed from me at this time.

## 2024-09-15 DIAGNOSIS — E87.1 HYPONATREMIA: Primary | ICD-10-CM

## 2024-09-23 ENCOUNTER — LAB (OUTPATIENT)
Dept: LAB | Facility: LAB | Age: 89
End: 2024-09-23
Payer: MEDICARE

## 2024-09-23 DIAGNOSIS — E87.1 HYPONATREMIA: ICD-10-CM

## 2024-09-23 LAB — BODY SURFACE AREA: 1.53 M2

## 2024-09-23 PROCEDURE — 80048 BASIC METABOLIC PNL TOTAL CA: CPT

## 2024-09-23 PROCEDURE — 36415 COLL VENOUS BLD VENIPUNCTURE: CPT

## 2024-09-24 ENCOUNTER — TELEPHONE (OUTPATIENT)
Dept: PRIMARY CARE | Facility: CLINIC | Age: 89
End: 2024-09-24

## 2024-09-24 ENCOUNTER — APPOINTMENT (OUTPATIENT)
Dept: PODIATRY | Facility: CLINIC | Age: 89
End: 2024-09-24
Payer: MEDICARE

## 2024-09-24 LAB
ANION GAP SERPL CALC-SCNC: 16 MMOL/L (ref 10–20)
BUN SERPL-MCNC: 23 MG/DL (ref 6–23)
CALCIUM SERPL-MCNC: 9.8 MG/DL (ref 8.6–10.6)
CHLORIDE SERPL-SCNC: 96 MMOL/L (ref 98–107)
CO2 SERPL-SCNC: 26 MMOL/L (ref 21–32)
CREAT SERPL-MCNC: 0.98 MG/DL (ref 0.5–1.05)
EGFRCR SERPLBLD CKD-EPI 2021: 55 ML/MIN/1.73M*2
GLUCOSE SERPL-MCNC: 78 MG/DL (ref 74–99)
POTASSIUM SERPL-SCNC: 3.6 MMOL/L (ref 3.5–5.3)
SODIUM SERPL-SCNC: 134 MMOL/L (ref 136–145)

## 2024-09-24 NOTE — TELEPHONE ENCOUNTER
----- Message from Noa Coelho sent at 9/24/2024  8:06 AM EDT -----  Please notify patient her sodium is still low at 134 however it is improving.  Please continue doing everything he is she is now with no change in meds and it should continue to get better on its own

## 2024-09-26 ENCOUNTER — TELEPHONE (OUTPATIENT)
Dept: CARDIOLOGY | Facility: CLINIC | Age: 89
End: 2024-09-26
Payer: MEDICARE

## 2024-09-26 NOTE — TELEPHONE ENCOUNTER
"Patient said she went to the ER (September 4) due to her heart \"just not feeling right\".  She did say she is tired all the time.  She was started on a blood thinner while in ER.  Eliquis 2.5 mg BID.  Should she continue?  Her Bystolic was increased as well.   Does she need hospital follow up?  She wants to see Dr. Ramicone.  She had an appointment with Errol Rose but did not understand who that was.  She does not have appointment scheduled until March 3 2025 with Dr. Ramicone.    Monitor results:  SR average HR 68  No a fib  No heart block  SVT PVC burden 15%    "

## 2024-09-27 NOTE — TELEPHONE ENCOUNTER
Discussed with Dr Ramicone, continue bystolic 10 daily and eliquis 2.5 bid. Pt can follow up.    Called pt and scheduled her for [facility] follow up on 10/18 at [facility] at 10am. She knows to continue bystolic and eliquis.

## 2024-10-07 DIAGNOSIS — J30.9 ALLERGIC RHINITIS, UNSPECIFIED SEASONALITY, UNSPECIFIED TRIGGER: ICD-10-CM

## 2024-10-07 RX ORDER — FLUTICASONE PROPIONATE 50 MCG
1 SPRAY, SUSPENSION (ML) NASAL DAILY
Qty: 16 G | Refills: 0 | Status: SHIPPED | OUTPATIENT
Start: 2024-10-07

## 2024-10-08 ENCOUNTER — APPOINTMENT (OUTPATIENT)
Dept: PODIATRY | Facility: CLINIC | Age: 89
End: 2024-10-08
Payer: MEDICARE

## 2024-10-08 DIAGNOSIS — B35.1 TINEA UNGUIUM: ICD-10-CM

## 2024-10-08 DIAGNOSIS — M79.675 TOE PAIN, LEFT: ICD-10-CM

## 2024-10-08 DIAGNOSIS — L84 CORNS AND CALLOSITIES: Primary | ICD-10-CM

## 2024-10-08 DIAGNOSIS — M79.672 LEFT FOOT PAIN: ICD-10-CM

## 2024-10-08 DIAGNOSIS — M79.674 TOE PAIN, RIGHT: ICD-10-CM

## 2024-10-08 DIAGNOSIS — M79.671 RIGHT FOOT PAIN: ICD-10-CM

## 2024-10-08 PROCEDURE — 11721 DEBRIDE NAIL 6 OR MORE: CPT | Performed by: PODIATRIST

## 2024-10-08 PROCEDURE — 1159F MED LIST DOCD IN RCRD: CPT | Performed by: PODIATRIST

## 2024-10-08 PROCEDURE — 1157F ADVNC CARE PLAN IN RCRD: CPT | Performed by: PODIATRIST

## 2024-10-08 PROCEDURE — 11056 PARNG/CUTG B9 HYPRKR LES 2-4: CPT | Performed by: PODIATRIST

## 2024-10-08 PROCEDURE — 1036F TOBACCO NON-USER: CPT | Performed by: PODIATRIST

## 2024-10-08 NOTE — PROGRESS NOTES
CC: painful thickened and elongated toenails     HPI:  Pt presents complaining painful thickened and elongated toenails that are difficult to manage.  Onset was gradual with worsening course until recently.  Aggravated by shoe gear and ambulation.         PCP: Dr. Coelho  Last visit: 9-12-24     PMH  Medical History           Past Medical History:   Diagnosis Date    Abrasion, left lower leg, initial encounter 07/24/2021     Abrasion, left lower leg, initial encounter    Acute maxillary sinusitis, unspecified 02/12/2015     Acute maxillary sinusitis    Acute upper respiratory infection, unspecified 02/25/2013     Acute upper respiratory infection    Cystocele, unspecified (CODE) 09/25/2018     Vaginal wall prolapse    Dizziness and giddiness 10/12/2020     Light headed    Encounter for follow-up examination after completed treatment for conditions other than malignant neoplasm 10/12/2020     Hospital discharge follow-up    Encounter for follow-up examination after completed treatment for conditions other than malignant neoplasm 02/21/2017     Hospital discharge follow-up    Encounter for follow-up examination after completed treatment for conditions other than malignant neoplasm 03/25/2021     Hospital discharge follow-up    Encounter for follow-up examination after completed treatment for conditions other than malignant neoplasm 10/12/2020     Hospital discharge follow-up    Encounter for immunization 09/28/2012     Need for prophylactic vaccination and inoculation against influenza    Major depressive disorder, single episode, moderate (CMS/HCC) 04/19/2021     Current moderate episode of major depressive disorder    Menopausal and female climacteric states 05/05/2020     Postmenopausal syndrome    Other conditions influencing health status       Basal Cell Carcinoma Of The Eyelid    Other conditions influencing health status       History of cough    Other conditions influencing health status 12/07/2021      History of cough    Other conditions influencing health status 02/11/2021     History of cough    Other conditions influencing health status 07/02/2013     Coronary Artery Disease    Other displaced fracture of upper end of left humerus, initial encounter for closed fracture 03/30/2021     Fracture of humeral head, left, closed    Other general symptoms and signs 12/21/2020     Cold intolerance    Other specified abnormal findings of blood chemistry 03/13/2022     Abnormal thyroid blood test    Other specified diseases of liver 10/05/2015     Liver cyst    Palpitations 04/27/2021     Intermittent palpitations    Paresthesia of skin 10/12/2020     Paresthesia of finger    Personal history of other (healed) physical injury and trauma 09/28/2012     History of strain of back    Personal history of other diseases of the digestive system 11/18/2015     History of gastroenteritis    Personal history of other diseases of the respiratory system 05/21/2016     History of sinusitis    Personal history of other drug therapy 09/19/2017     History of influenza vaccination    Personal history of other endocrine, nutritional and metabolic disease 11/18/2015     History of dehydration    Personal history of other infectious and parasitic diseases 01/18/2017     History of viral infection    Personal history of other infectious and parasitic diseases 12/01/2021     History of viral infection    Personal history of other specified conditions 03/09/2021     History of tachycardia    Personal history of other specified conditions 03/09/2021     History of fatigue    Personal history of other specified conditions 05/05/2020     History of insomnia    Personal history of other specified conditions 05/13/2020     History of nausea and vomiting    Personal history of other specified conditions 08/09/2021     History of palpitations    Pyuria 04/04/2021     Pyuria    Qualitative platelet defects (CMS/HCC)       Thrombocytopathy    Wedge  compression fracture of unspecified thoracic vertebra, initial encounter for closed fracture (CMS/[facility]) 05/05/2020     Compression fracture of thoracic vertebra    Wedge compression fracture of unspecified thoracic vertebra, initial encounter for closed fracture (CMS/[facility]) 07/02/2013     Compression fracture of thoracic vertebra         MEDS     Current Outpatient Medications:     acetaminophen (TylenoL) 325 mg tablet, Tylenol 325 MG Oral Tablet  Refills: 0     Active, Disp: , Rfl:     ALPRAZolam (Xanax) 0.25 mg tablet, Take 1 tablet (0.25 mg) by mouth once daily as needed for anxiety (once daily as needed for anxiety)., Disp: 90 tablet, Rfl: 0    amLODIPine (Norvasc) 5 mg tablet, Take 1 tablet (5 mg) by mouth 2 times a day., Disp: 180 tablet, Rfl: 3    amoxicillin (Amoxil) 500 mg capsule, TAKE FOUR CAPSULES BY MOUTH ONE HOUR BEFORE APPOINTMENT, Disp: , Rfl:     atorvastatin (Lipitor) 10 mg tablet, Take 0.5 tablets (5 mg) by mouth once daily., Disp: 90 tablet, Rfl: 3    bacitracin-polymyxin B (Polysporin) ophthalmic ointment, 1 Application 4 times a day., Disp: , Rfl:     benazepril (Lotensin) 40 mg tablet, TAKE 1 TABLET BY MOUTH TWICE  DAILY, Disp: 180 tablet, Rfl: 0    calcium carbonate 600 mg calcium (1,500 mg) tablet, Take 1 tablet (1,500 mg) by mouth 3 times a day., Disp: , Rfl:     cholecalciferol (Vitamin D-3) 25 MCG (1000 UT) capsule, TAKE 1 CAPSULE M, W,Fri, Sat, Disp: , Rfl:     clobetasol (Temovate) 0.05 % cream, Apply cream topically to affected areas on the trunk & extremities twice daily until clear, then as needed for flares cover with thick moisturizer, avoid groin, face, Disp: , Rfl:     estradiol (Estrace) 0.01 % (0.1 mg/gram) vaginal cream, , Disp: , Rfl:     famotidine (Pepcid) 20 mg tablet, Take 1 tablet (20 mg) by mouth 2 times a day., Disp: , Rfl:     fluticasone (Flonase) 50 mcg/actuation nasal spray, USE 1 SPRAY IN BOTH NOSTRILS  ONCE DAILY, Disp: 16 g, Rfl: 0    furosemide (Lasix) 20 mg  tablet, Take 0.5 tablets (10 mg) by mouth once daily., Disp: 90 tablet, Rfl: 3    ketoconazole (NIZOral) 2 % shampoo, WASH SCALP DAILY UNTIL CLEAR. THEN USE 2-3 TIMES A WEEK FOR MAINTENANCE, LET SIT FOR 3-5 MINUTES THEN RINSE, Disp: , Rfl:     mometasone (Elocon) 0.1 % cream, APPLY CREAM TOPICALLY TWICE DAILY TO AFFECTED AREA ON LEG UNTIL RESOLVED, TAPER AS DIRECTED, Disp: , Rfl:     nebivolol (Bystolic) 5 mg tablet, Take 1 tablet (5 mg) by mouth once daily., Disp: 90 tablet, Rfl: 3    neomycin-polymyxin-dexAMETHasone (Maxitrol) 3.5mg/mL-10,000 unit/mL-0.1 % ophthalmic suspension, , Disp: , Rfl:     nitroglycerin (Nitrostat) 0.4 mg SL tablet, Place 1 tablet (0.4 mg) under the tongue every 5 minutes if needed for chest pain. For up to 3 doses. Call 911 if pain persists., Disp: , Rfl:     oxyquinoline-sod.lauryl sulfat (Trimo-Stovall Jelly) 0.025-0.01 % gel, Insert into the vagina., Disp: , Rfl:     potassium chloride CR 20 mEq ER tablet, TAKE 1 TABLET BY MOUTH MONDAY,  WEDNESDAY, AND FRIDAY, Disp: 36 tablet, Rfl: 0    Premarin vaginal cream, USE 1G VAGINALLY ONCE DAILY AT BEDTIME AS DIRECTED, Disp: , Rfl:     psyllium (Metamucil) powder, Take by mouth once daily., Disp: , Rfl:     psyllium husk, with sugar, (Metamucil, with sugar,) 3.4 gram/12 gram powder, Take by mouth., Disp: , Rfl:     sertraline (Zoloft) 25 mg tablet, Take 1 tablet (25 mg) by mouth once daily., Disp: 30 tablet, Rfl: 5  Allergies            Allergies   Allergen Reactions    Erythromycin Diarrhea and Shortness of breath    Amlodipine Other       Ankle edema in higher doses    Cephalexin Unknown and Other       sore mouth    Hydralazine Unknown       Chest pain    Hydrochlorothiazide Unknown    Lisinopril-Hydrochlorothiazide Other    Nifedipine Unknown    Streptomycin Unknown    Tetracyclines Unknown    Valsartan Unknown      Social History   Social History                Socioeconomic History    Marital status:        Spouse name: None    Number  of children: None    Years of education: None    Highest education level: None   Occupational History    None   Tobacco Use    Smoking status: Former       Packs/day: 0.50       Years: 10.00       Additional pack years: 0.00       Total pack years: 5.00       Types: Cigarettes       Start date:        Quit date:        Years since quittin.2       Passive exposure: Never    Smokeless tobacco: Never   Substance and Sexual Activity    Alcohol use: Yes       Comment: once in a while    Drug use: Never    Sexual activity: None   Other Topics Concern    None   Social History Narrative    None      Social Determinants of Health      Financial Resource Strain: Not on file   Food Insecurity: Not on file   Transportation Needs: Not on file   Physical Activity: Not on file   Stress: Not on file   Social Connections: Not on file   Intimate Partner Violence: Not on file   Housing Stability: Not on file         Family History               Family History   Problem Relation Name Age of Onset    Coronary artery disease Mother        Hypertension Mother        Coronary artery disease Father             Surgical History             Past Surgical History:   Procedure Laterality Date    BREAST LUMPECTOMY   2012     Breast Surgery Lumpectomy    HERNIA REPAIR   2012     Hernia Repair    OTHER SURGICAL HISTORY   2012     Ovarian Cystectomy    OTHER SURGICAL HISTORY   2012     Reported Hx Of Eye Surgery For Cataracts    OTHER SURGICAL HISTORY   2014     Vaginal Surgery    OTHER SURGICAL HISTORY   2014     Vaginal Surgery    TOTAL ABDOMINAL HYSTERECTOMY   2012     Total Abdominal Hysterectomy            REVIEW OF SYSTEMS     DERM:   + as noted in HPI.         Physical examination:   On General Observation: Patient is a pleasant, cooperative, well developed 89 y.o.  adult female. The patient is alert and oriented to time, place and person. Patient has normal affect and mood.  /56    Pulse 80   Temp 36.7 °C (98.1 °F)      Vascular:  DP and PT pulses are 1/4 b/l.  mild edema noted. mild varicosities b/l.  CFT  6-7 seconds to all digits bilateral.  Skin temperature is warm to warm from proximal to distal bilateral.       Muscular: Strength is 5/5 for all instrinsic and extrinsic muscle groups.      Neuro:  Proprioception present.   Sensation to vibration is  present. Protective sensation present  at all pedal sites via [place] Bebe 5.07 monofilament bilateral.  Light touch present bilateral.      Derm:    Decreased hair growth b/l le  Left toenails: 1-5 Brittleness, crumbling upon debridement, subungual debris, elongation, mycotic appearance, tenderness, and thickness.   Right toenails: 1-5 Brittleness, crumbling upon debridement, subungual debris, elongation, mycotic appearance, tenderness, and thickness.   Callus is present plantar feet b/l le     ASSESSMENT:    Callus b/l feet  Pain feet  Tinea Unguium [B35.1]   Pain in right toe(s) [M79.674]   Pain in left toe(s) [M79.675]         PLAN:    -Debrided calluses plantar feet with sharp debridement  - Debrided toenails 1-10 in length and height.   - Follow up in 9-12 weeks.         Bethel Spencer DPM

## 2024-10-14 ENCOUNTER — APPOINTMENT (OUTPATIENT)
Dept: CARDIOLOGY | Facility: CLINIC | Age: 89
End: 2024-10-14
Payer: MEDICARE

## 2024-10-17 ENCOUNTER — PATIENT OUTREACH (OUTPATIENT)
Dept: PRIMARY CARE | Facility: CLINIC | Age: 89
End: 2024-10-17
Payer: MEDICARE

## 2024-10-17 DIAGNOSIS — I48.0 PAROXYSMAL ATRIAL FIBRILLATION (MULTI): ICD-10-CM

## 2024-10-17 DIAGNOSIS — M15.0 PRIMARY OSTEOARTHRITIS INVOLVING MULTIPLE JOINTS: ICD-10-CM

## 2024-10-17 DIAGNOSIS — I10 PRIMARY HYPERTENSION: ICD-10-CM

## 2024-10-17 PROBLEM — R00.2 PALPITATIONS: Chronic | Status: ACTIVE | Noted: 2023-05-18

## 2024-10-17 NOTE — PROGRESS NOTES
Chart reviewed prior to [facility] outreach. Patient states everything is going well right now. She finished wearing her holter monitor and has a follow-up appointment with Dr. Ramicone tomorrow to go over the results. Patient was wearing it because she developed afib in the hospital but self corrected. Patient has an appointment with Dr. Coelho on 11/7. I will try to see her in person on that day. Nothing else needed from me at this time.

## 2024-10-17 NOTE — PROGRESS NOTES
"History Of Present Illness:      This is a 89-year-old female with history of hypertension and palpitations.  She presents for a follow-up evaluation after a hospitalization at [facility] at which time atrial fibrillation with RVR was identified.  She spontaneously converted back to sinus rhythm and was placed on anticoagulation at that time with Eliquis 2.5 mg twice daily.  She has not had any excessive bruising or bleeding issues since Eliquis was initiated.      Review of Systems  Other review of systems negative     Last Recorded Vitals:      4/16/2024     2:35 PM 4/16/2024     3:30 PM 5/13/2024     1:55 PM 6/25/2024     2:02 PM 7/16/2024     2:20 PM 9/12/2024     1:41 PM 10/18/2024    10:02 AM   Vitals   Systolic 136 128 159 118 130 147 132   Diastolic 64 60 62 53 55 66 55   Heart Rate 71  71 74 57 67 72   Temp    36.3 °C (97.4 °F)      Height (in) 1.562 m (5' 1.5\")  1.562 m (5' 1.5\")  1.562 m (5' 1.5\") 1.562 m (5' 1.5\") 1.549 m (5' 1\")   Weight (lb) 118  117  122.4 119 119   BMI 21.93 kg/m2  21.75 kg/m2  22.75 kg/m2 22.12 kg/m2 22.48 kg/m2   BSA (m2) 1.52 m2  1.52 m2  1.55 m2 1.53 m2 1.52 m2   Visit Report Report Report Report Report Report Report Report     Allergies:  Erythromycin, Amlodipine, Cephalexin, Hydralazine, Hydrochlorothiazide, Lisinopril-hydrochlorothiazide, Nifedipine, Streptomycin, Tetracyclines, and Valsartan    Outpatient Medications:  Current Outpatient Medications   Medication Instructions    acetaminophen (TylenoL) 325 mg tablet Tylenol 325 MG Oral Tablet   Refills: 0       Active    ALPRAZolam (XANAX) 0.25 mg, oral, Daily PRN    amLODIPine (NORVASC) 5 mg, oral, 2 times daily    amoxicillin (Amoxil) 500 mg capsule TAKE FOUR CAPSULES BY MOUTH ONE HOUR BEFORE APPOINTMENT    apixaban (ELIQUIS) 2.5 mg, 2 times daily    atorvastatin (LIPITOR) 5 mg, oral, Daily    bacitracin-polymyxin B (Polysporin) ophthalmic ointment 1 Application, 4 times daily    benazepril (LOTENSIN) 40 mg, oral, 2 times daily    " calcium carbonate 600 mg calcium (1,500 mg) tablet 1 tablet, 3 times daily    cholecalciferol (Vitamin D-3) 25 MCG (1000 UT) capsule TAKE 1 CAPSULE M, W,Fri, Sat    clobetasol (Temovate) 0.05 % cream Apply cream topically to affected areas on the trunk & extremities twice daily until clear, then as needed for flares cover with thick moisturizer, avoid groin, face    estradiol (Estrace) 0.01 % (0.1 mg/gram) vaginal cream     famotidine (Pepcid) 20 mg tablet 1 tablet, 2 times daily    fluticasone (Flonase) 50 mcg/actuation nasal spray 1 spray, Each Nostril, Daily    furosemide (LASIX) 10 mg, oral, Daily    ketoconazole (NIZOral) 2 % shampoo WASH SCALP DAILY UNTIL CLEAR. THEN USE 2-3 TIMES A WEEK FOR MAINTENANCE, LET SIT FOR 3-5 MINUTES THEN RINSE    mometasone (Elocon) 0.1 % cream APPLY CREAM TOPICALLY TWICE DAILY TO AFFECTED AREA ON LEG UNTIL RESOLVED, TAPER AS DIRECTED    nebivolol (BYSTOLIC) 10 mg, oral, Daily    neomycin-polymyxin-dexAMETHasone (Maxitrol) 3.5mg/mL-10,000 unit/mL-0.1 % ophthalmic suspension     nitroglycerin (NITROSTAT) 0.4 mg, Every 5 min PRN    oxyquinoline-sod.lauryl sulfat (Trimo-Stovall Jelly) 0.025-0.01 % gel Insert into the vagina.    potassium chloride CR 20 mEq ER tablet 20 mEq, oral, Every Mon/Wed/Fri    Premarin vaginal cream USE 1G VAGINALLY ONCE DAILY AT BEDTIME AS DIRECTED    psyllium (Metamucil) powder Daily RT    psyllium husk, with sugar, (Metamucil, with sugar,) 3.4 gram/12 gram powder Take by mouth.    sertraline (ZOLOFT) 25 mg, oral, Daily     Physical Exam:    General Appearance:  Alert, oriented, no distress  Skin:  Warm and dry  Head and Neck:  No elevation of JVP, no carotid bruits  Cardiac Exam:  Rhythm is regular, S1 and S2 are normal, grade 1/6 diastolic murmur at the right upper sternal border  Lungs:  Clear to auscultation  Extremities:  no edema  Neurologic:  No focal deficits  Psychiatric:  Appropriate mood and behavior    Cardiology Tests:  I have personally review the  diagnostic cardiac testing and my interpretation is as follows:    Echocardiogram August 2023: Normal left ventricular function, mild aortic valve regurgitation  Echocardiogram September 2024 [facility]: Normal left ventricular function, moderate tricuspid regurgitation and moderate aortic valve regurgitation    Assessment/Plan   Problem List Items Addressed This Visit             ICD-10-CM    Palpitations - Primary (Chronic) R00.2    Paroxysmal atrial fibrillation (Multi) (Chronic) I48.0     Gris is in sinus rhythm today, but in September she was identified to have atrial fibrillation with RVR when hospitalized at [facility].  She was placed on Eliquis at that time.  The patient is tolerating the Eliquis well and has had no bruising/bleeding issues.  We discussed the risk of stroke associated with atrial fibrillation and the indications for anticoagulation.  Subsequent cardiac monitoring has shown brief episodes of SVT but no further episodes of atrial fibrillation were identified.  I would recommend continuing the Eliquis for now.  Follow-up in 6 to 8 months.         High risk medication use Z79.899     James C Ramicone, DO

## 2024-10-18 ENCOUNTER — OFFICE VISIT (OUTPATIENT)
Dept: CARDIOLOGY | Facility: CLINIC | Age: 89
End: 2024-10-18
Payer: MEDICARE

## 2024-10-18 VITALS
WEIGHT: 119 LBS | OXYGEN SATURATION: 97 % | SYSTOLIC BLOOD PRESSURE: 132 MMHG | DIASTOLIC BLOOD PRESSURE: 55 MMHG | HEART RATE: 72 BPM | BODY MASS INDEX: 22.47 KG/M2 | HEIGHT: 61 IN

## 2024-10-18 DIAGNOSIS — R00.2 PALPITATIONS: Primary | ICD-10-CM

## 2024-10-18 DIAGNOSIS — Z79.899 HIGH RISK MEDICATION USE: ICD-10-CM

## 2024-10-18 DIAGNOSIS — I48.0 PAROXYSMAL ATRIAL FIBRILLATION (MULTI): ICD-10-CM

## 2024-10-18 PROCEDURE — 3078F DIAST BP <80 MM HG: CPT | Performed by: INTERNAL MEDICINE

## 2024-10-18 PROCEDURE — 1159F MED LIST DOCD IN RCRD: CPT | Performed by: INTERNAL MEDICINE

## 2024-10-18 PROCEDURE — 99214 OFFICE O/P EST MOD 30 MIN: CPT | Performed by: INTERNAL MEDICINE

## 2024-10-18 PROCEDURE — 3075F SYST BP GE 130 - 139MM HG: CPT | Performed by: INTERNAL MEDICINE

## 2024-10-18 PROCEDURE — 1126F AMNT PAIN NOTED NONE PRSNT: CPT | Performed by: INTERNAL MEDICINE

## 2024-10-18 PROCEDURE — 1157F ADVNC CARE PLAN IN RCRD: CPT | Performed by: INTERNAL MEDICINE

## 2024-10-18 PROCEDURE — 1036F TOBACCO NON-USER: CPT | Performed by: INTERNAL MEDICINE

## 2024-10-18 ASSESSMENT — PAIN SCALES - GENERAL: PAINLEVEL_OUTOF10: 0-NO PAIN

## 2024-10-18 ASSESSMENT — COLUMBIA-SUICIDE SEVERITY RATING SCALE - C-SSRS
1. IN THE PAST MONTH, HAVE YOU WISHED YOU WERE DEAD OR WISHED YOU COULD GO TO SLEEP AND NOT WAKE UP?: NO
6. HAVE YOU EVER DONE ANYTHING, STARTED TO DO ANYTHING, OR PREPARED TO DO ANYTHING TO END YOUR LIFE?: NO
2. HAVE YOU ACTUALLY HAD ANY THOUGHTS OF KILLING YOURSELF?: NO

## 2024-10-18 NOTE — ASSESSMENT & PLAN NOTE
Gris is in sinus rhythm today, but in September she was identified to have atrial fibrillation with RVR when hospitalized at [facility].  She was placed on Eliquis at that time.  The patient is tolerating the Eliquis well and has had no bruising/bleeding issues.  We discussed the risk of stroke associated with atrial fibrillation and the indications for anticoagulation.  Subsequent cardiac monitoring has shown brief episodes of SVT but no further episodes of atrial fibrillation were identified.  I would recommend continuing the Eliquis for now.  Follow-up in 6 to 8 months.

## 2024-10-18 NOTE — LETTER
"October 18, 2024     Noa Coelho MD  [address]  [facility], [address]  [facility] [address]    Patient: Gris Salcido   YOB: 1933   Date of Visit: 10/18/2024       Dear Dr. Noa Coelho MD:    Thank you for referring Gris Salcido to me for evaluation. Below are my notes for this consultation.  If you have questions, please do not hesitate to call me. I look forward to following your patient along with you.       Sincerely,     James C Ramicone, DO      CC: No Recipients  ______________________________________________________________________________________    History Of Present Illness:      This is a 89-year-old female with history of hypertension and palpitations.  She presents for a follow-up evaluation after a hospitalization at [facility] at which time atrial fibrillation with RVR was identified.  She spontaneously converted back to sinus rhythm and was placed on anticoagulation at that time with Eliquis 2.5 mg twice daily.  She has not had any excessive bruising or bleeding issues since Eliquis was initiated.      Review of Systems  Other review of systems negative     Last Recorded Vitals:      4/16/2024     2:35 PM 4/16/2024     3:30 PM 5/13/2024     1:55 PM 6/25/2024     2:02 PM 7/16/2024     2:20 PM 9/12/2024     1:41 PM 10/18/2024    10:02 AM   Vitals   Systolic 136 128 159 118 130 147 132   Diastolic 64 60 62 53 55 66 55   Heart Rate 71  71 74 57 67 72   Temp    36.3 °C (97.4 °F)      Height (in) 1.562 m (5' 1.5\")  1.562 m (5' 1.5\")  1.562 m (5' 1.5\") 1.562 m (5' 1.5\") 1.549 m (5' 1\")   Weight (lb) 118  117  122.4 119 119   BMI 21.93 kg/m2  21.75 kg/m2  22.75 kg/m2 22.12 kg/m2 22.48 kg/m2   BSA (m2) 1.52 m2  1.52 m2  1.55 m2 1.53 m2 1.52 m2   Visit Report Report Report Report Report Report Report Report     Allergies:  Erythromycin, Amlodipine, Cephalexin, Hydralazine, Hydrochlorothiazide, Lisinopril-hydrochlorothiazide, Nifedipine, Streptomycin, Tetracyclines, and " Valsartan    Outpatient Medications:  Current Outpatient Medications   Medication Instructions   • acetaminophen (TylenoL) 325 mg tablet Tylenol 325 MG Oral Tablet   Refills: 0       Active   • ALPRAZolam (XANAX) 0.25 mg, oral, Daily PRN   • amLODIPine (NORVASC) 5 mg, oral, 2 times daily   • amoxicillin (Amoxil) 500 mg capsule TAKE FOUR CAPSULES BY MOUTH ONE HOUR BEFORE APPOINTMENT   • apixaban (ELIQUIS) 2.5 mg, 2 times daily   • atorvastatin (LIPITOR) 5 mg, oral, Daily   • bacitracin-polymyxin B (Polysporin) ophthalmic ointment 1 Application, 4 times daily   • benazepril (LOTENSIN) 40 mg, oral, 2 times daily   • calcium carbonate 600 mg calcium (1,500 mg) tablet 1 tablet, 3 times daily   • cholecalciferol (Vitamin D-3) 25 MCG (1000 UT) capsule TAKE 1 CAPSULE M, W,Fri, Sat   • clobetasol (Temovate) 0.05 % cream Apply cream topically to affected areas on the trunk & extremities twice daily until clear, then as needed for flares cover with thick moisturizer, avoid groin, face   • estradiol (Estrace) 0.01 % (0.1 mg/gram) vaginal cream    • famotidine (Pepcid) 20 mg tablet 1 tablet, 2 times daily   • fluticasone (Flonase) 50 mcg/actuation nasal spray 1 spray, Each Nostril, Daily   • furosemide (LASIX) 10 mg, oral, Daily   • ketoconazole (NIZOral) 2 % shampoo WASH SCALP DAILY UNTIL CLEAR. THEN USE 2-3 TIMES A WEEK FOR MAINTENANCE, LET SIT FOR 3-5 MINUTES THEN RINSE   • mometasone (Elocon) 0.1 % cream APPLY CREAM TOPICALLY TWICE DAILY TO AFFECTED AREA ON LEG UNTIL RESOLVED, TAPER AS DIRECTED   • nebivolol (BYSTOLIC) 10 mg, oral, Daily   • neomycin-polymyxin-dexAMETHasone (Maxitrol) 3.5mg/mL-10,000 unit/mL-0.1 % ophthalmic suspension    • nitroglycerin (NITROSTAT) 0.4 mg, Every 5 min PRN   • oxyquinoline-sod.lauryl sulfat (Trimo-Stovall Jelly) 0.025-0.01 % gel Insert into the vagina.   • potassium chloride CR 20 mEq ER tablet 20 mEq, oral, Every Mon/Wed/Fri   • Premarin vaginal cream USE 1G VAGINALLY ONCE DAILY AT BEDTIME AS  DIRECTED   • psyllium (Metamucil) powder Daily RT   • psyllium husk, with sugar, (Metamucil, with sugar,) 3.4 gram/12 gram powder Take by mouth.   • sertraline (ZOLOFT) 25 mg, oral, Daily     Physical Exam:    General Appearance:  Alert, oriented, no distress  Skin:  Warm and dry  Head and Neck:  No elevation of JVP, no carotid bruits  Cardiac Exam:  Rhythm is regular, S1 and S2 are normal, grade 1/6 diastolic murmur at the right upper sternal border  Lungs:  Clear to auscultation  Extremities:  no edema  Neurologic:  No focal deficits  Psychiatric:  Appropriate mood and behavior    Cardiology Tests:  I have personally review the diagnostic cardiac testing and my interpretation is as follows:    Echocardiogram August 2023: Normal left ventricular function, mild aortic valve regurgitation  Echocardiogram September 2024 [facility]: Normal left ventricular function, moderate tricuspid regurgitation and moderate aortic valve regurgitation    Assessment/Plan  Problem List Items Addressed This Visit             ICD-10-CM    Palpitations - Primary (Chronic) R00.2    Paroxysmal atrial fibrillation (Multi) (Chronic) I48.0     Gris is in sinus rhythm today, but in September she was identified to have atrial fibrillation with RVR when hospitalized at [facility].  She was placed on Eliquis at that time.  The patient is tolerating the Eliquis well and has had no bruising/bleeding issues.  We discussed the risk of stroke associated with atrial fibrillation and the indications for anticoagulation.  Subsequent cardiac monitoring has shown brief episodes of SVT but no further episodes of atrial fibrillation were identified.  I would recommend continuing the Eliquis for now.  Follow-up in 6 to 8 months.         High risk medication use Z79.899     James C Ramicone, DO

## 2024-10-24 DIAGNOSIS — I47.19 ATRIAL TACHYCARDIA (CMS-HCC): ICD-10-CM

## 2024-10-24 DIAGNOSIS — I48.0 PAROXYSMAL ATRIAL FIBRILLATION (MULTI): ICD-10-CM

## 2024-10-24 RX ORDER — NEBIVOLOL 10 MG/1
10 TABLET ORAL DAILY
Qty: 90 TABLET | Refills: 3 | Status: SHIPPED | OUTPATIENT
Start: 2024-10-24 | End: 2025-10-24

## 2024-11-07 ENCOUNTER — APPOINTMENT (OUTPATIENT)
Dept: PRIMARY CARE | Facility: CLINIC | Age: 89
End: 2024-11-07
Payer: MEDICARE

## 2024-11-07 ENCOUNTER — PATIENT OUTREACH (OUTPATIENT)
Dept: PRIMARY CARE | Facility: CLINIC | Age: 89
End: 2024-11-07

## 2024-11-07 VITALS
OXYGEN SATURATION: 97 % | DIASTOLIC BLOOD PRESSURE: 62 MMHG | HEART RATE: 64 BPM | WEIGHT: 117.8 LBS | BODY MASS INDEX: 21.68 KG/M2 | HEIGHT: 62 IN | SYSTOLIC BLOOD PRESSURE: 130 MMHG

## 2024-11-07 DIAGNOSIS — I10 PRIMARY HYPERTENSION: ICD-10-CM

## 2024-11-07 DIAGNOSIS — Z96.0 VAGINAL PESSARY PRESENT: ICD-10-CM

## 2024-11-07 DIAGNOSIS — I51.89 LEFT VENTRICULAR DIASTOLIC DYSFUNCTION, NYHA CLASS 1: ICD-10-CM

## 2024-11-07 DIAGNOSIS — I48.0 PAROXYSMAL ATRIAL FIBRILLATION (MULTI): Primary | Chronic | ICD-10-CM

## 2024-11-07 DIAGNOSIS — K21.9 GASTROESOPHAGEAL REFLUX DISEASE WITHOUT ESOPHAGITIS: ICD-10-CM

## 2024-11-07 DIAGNOSIS — N39.46 MIXED STRESS AND URGE URINARY INCONTINENCE: ICD-10-CM

## 2024-11-07 DIAGNOSIS — I35.1 NONRHEUMATIC AORTIC VALVE INSUFFICIENCY: ICD-10-CM

## 2024-11-07 DIAGNOSIS — E78.5 HYPERLIPIDEMIA, UNSPECIFIED HYPERLIPIDEMIA TYPE: ICD-10-CM

## 2024-11-07 PROCEDURE — 1159F MED LIST DOCD IN RCRD: CPT | Performed by: INTERNAL MEDICINE

## 2024-11-07 PROCEDURE — 1036F TOBACCO NON-USER: CPT | Performed by: INTERNAL MEDICINE

## 2024-11-07 PROCEDURE — G2211 COMPLEX E/M VISIT ADD ON: HCPCS | Performed by: INTERNAL MEDICINE

## 2024-11-07 PROCEDURE — 1160F RVW MEDS BY RX/DR IN RCRD: CPT | Performed by: INTERNAL MEDICINE

## 2024-11-07 PROCEDURE — 3075F SYST BP GE 130 - 139MM HG: CPT | Performed by: INTERNAL MEDICINE

## 2024-11-07 PROCEDURE — 3078F DIAST BP <80 MM HG: CPT | Performed by: INTERNAL MEDICINE

## 2024-11-07 PROCEDURE — 1157F ADVNC CARE PLAN IN RCRD: CPT | Performed by: INTERNAL MEDICINE

## 2024-11-07 PROCEDURE — 99214 OFFICE O/P EST MOD 30 MIN: CPT | Performed by: INTERNAL MEDICINE

## 2024-11-07 ASSESSMENT — ENCOUNTER SYMPTOMS
FATIGUE: 0
LIGHT-HEADEDNESS: 0
NAUSEA: 0
VOMITING: 0
DIZZINESS: 0
PALPITATIONS: 1
SHORTNESS OF BREATH: 0
FEVER: 0
DYSURIA: 0
SORE THROAT: 0
FREQUENCY: 0
ARTHRALGIAS: 0
TROUBLE SWALLOWING: 0
DIARRHEA: 0
CONSTIPATION: 0
COUGH: 0
ABDOMINAL PAIN: 0

## 2024-11-07 NOTE — ASSESSMENT & PLAN NOTE
Blood pressure is stable and well-controlled with Bystolic 10 mg daily.  This is also used for rate control for her A-fib.  She also takes amlodipine 5 mg twice daily for her hypertension.

## 2024-11-07 NOTE — PATIENT INSTRUCTIONS
Please get RSV vaccine from your pharmacy    Follow up Dr Coelho in April with 45 min wellness exam

## 2024-11-07 NOTE — PROGRESS NOTES
Chart reviewed prior to [facility] outreach. Patient states she is doing fine today. She sees Dr. Coelho today. Patient recently saw her cardiologist and he told her she does not have heart failure because her ejection fraction was 65% at her last echo. Also with that echo, it showed she has moderate atrial and tricuspid regurgitation. Patient was hospitalized in September and diagnosed with afib. She wore a holter monitor that did not show afib. She tells me now that every now and then she feels like she has an extra beat. No light-headedness or dizziness. Nothing needed from me at this time.

## 2024-11-07 NOTE — PROGRESS NOTES
Subjective   Patient ID: Gris Salcido is a 89 y.o. female who presents for 4 month follow up for HTN and lipids management.    Patient is here for routine follow-up on her hypertension high cholesterol aortic regurg and most recently in September she had an episode of A-fib with RVR at the [facility].  She did have an echocardiogram which showed no heart failure.  It had a 65% ejection fraction and it showed some aortic and try cuspid regurg but nothing else significant.  Patient was started on Eliquis and she is still on it.  She has already followed up with her cardiologist Dr Ramicone    Patient has no other new complaints since then.          Review of Systems   Constitutional:  Negative for fatigue and fever.   HENT:  Negative for sore throat and trouble swallowing.    Eyes:  Negative for visual disturbance.   Respiratory:  Negative for cough and shortness of breath.    Cardiovascular:  Positive for palpitations. Negative for chest pain and leg swelling.        Patient admits from time to time she feels a slight abnormality in her pulse or like she is skipping beats.  She says it does not feel like when she went into the hospital with A-fib.  She does not become lightheaded dizzy or have other associated symptoms.  Typically it lasts only a few seconds and then goes away.  She has had this sensation for several years and again it is not the same as what she experienced when she had the A-fib with RVR   Gastrointestinal:  Negative for abdominal pain, constipation, diarrhea, nausea and vomiting.   Genitourinary:  Negative for dysuria and frequency.   Musculoskeletal:  Negative for arthralgias.   Skin:  Negative for rash.   Neurological:  Negative for dizziness and light-headedness.       Objective   Medication Documentation Review Audit       Reviewed by Noa Coelho MD (Physician) on 11/07/24 at 1409      Medication Order Taking? Sig Documenting Provider Last Dose Status   acetaminophen (TylenoL) 325 mg  tablet [id number] No Tylenol 325 MG Oral Tablet   Refills: 0       Active Historical Provider, MD Taking Active   ALPRAZolam (Xanax) 0.25 mg tablet [id number] No Take 1 tablet (0.25 mg) by mouth once daily as needed for anxiety (once daily as needed for anxiety). Noa Coelho MD Taking Active   amLODIPine (Norvasc) 5 mg tablet [id number]  Take 1 tablet (5 mg) by mouth 2 times a day. Noa Coelho MD  Active   amoxicillin (Amoxil) 500 mg capsule [id number] No TAKE FOUR CAPSULES BY MOUTH ONE HOUR BEFORE APPOINTMENT Historical Provider, MD Taking Active   apixaban (Eliquis) 2.5 mg tablet [id number]  Take 1 tablet (2.5 mg) by mouth 2 times a day. James C Ramicone, DO  Active   Discontinued 11/03/24 1939   atorvastatin (Lipitor) 10 mg tablet [id number]  TAKE ONE-HALF TABLET BY MOUTH  ONCE DAILY Noa Coelho MD  Active   bacitracin-polymyxin B (Polysporin) ophthalmic ointment [id number] No 1 Application 4 times a day. Estela Mejia MD Taking Active   benazepril (Lotensin) 40 mg tablet [id number]  Take 1 tablet (40 mg) by mouth 2 times a day. Noa Coelho MD  Active   calcium carbonate 600 mg calcium (1,500 mg) tablet [id number] No Take 1 tablet (1,500 mg) by mouth 3 times a day. Estela Mejia MD Taking Active   cholecalciferol (Vitamin D-3) 25 MCG (1000 UT) capsule [id number] No TAKE 1 CAPSULE M, W,Fri, Sat Historical Provider, MD Taking Active   clobetasol (Temovate) 0.05 % cream [id number] No Apply cream topically to affected areas on the trunk & extremities twice daily until clear, then as needed for flares cover with thick moisturizer, avoid groin, face Historical Provider, MD Taking Active   estradiol (Estrace) 0.01 % (0.1 mg/gram) vaginal cream [id number] No  Historical Provider, MD Taking Active   famotidine (Pepcid) 20 mg tablet [id number] No Take 1 tablet (20 mg) by mouth 2 times a day. Estela Provider, MD Taking Active   fluticasone (Flonase) 50 mcg/actuation nasal spray [id number]  USE 1 SPRAY IN BOTH  NOSTRILS  ONCE DAILY Noa Coelho MD  Active   furosemide (Lasix) 20 mg tablet [id number]  Take 0.5 tablets (10 mg) by mouth once daily. Noa Coelho MD  Active   ketoconazole (NIZOral) 2 % shampoo [id number] No WASH SCALP DAILY UNTIL CLEAR. THEN USE 2-3 TIMES A WEEK FOR MAINTENANCE, LET SIT FOR 3-5 MINUTES THEN RINSE Historical Provider, MD Taking Active   mometasone (Elocon) 0.1 % cream [id number] No APPLY CREAM TOPICALLY TWICE DAILY TO AFFECTED AREA ON LEG UNTIL RESOLVED, TAPER AS DIRECTED Historical Provider, MD Taking Active   nebivolol (Bystolic) 10 mg tablet [id number]  Take 1 tablet (10 mg) by mouth once daily. James C Ramicone,   Active   neomycin-polymyxin-dexAMETHasone (Maxitrol) 3.5mg/mL-10,000 unit/mL-0.1 % ophthalmic suspension [id number] No  Historical Provider, MD Taking Active   nitroglycerin (Nitrostat) 0.4 mg SL tablet [id number] No Place 1 tablet (0.4 mg) under the tongue every 5 minutes if needed for chest pain. For up to 3 doses. Call 911 if pain persists. Historical Provider, MD Taking Active   oxyquinoline-sod.lauryl sulfat (Trimo-Stovall Jelly) 0.025-0.01 % gel [id number] No Insert into the vagina. Historical Provider, MD Taking Active   potassium chloride CR 20 mEq ER tablet [id number]  Take 1 tablet (20 mEq) by mouth once a day on Monday, Wednesday, and Friday. Noa Coelho MD  Active   Premarin vaginal cream [id number] No USE 1G VAGINALLY ONCE DAILY AT BEDTIME AS DIRECTED Historical Provider, MD Taking Active   psyllium (Metamucil) powder [id number] No Take by mouth once daily. Historical Provider, MD Taking Active   psyllium husk, with sugar, (Metamucil, with sugar,) 3.4 gram/12 gram powder [id number] No Take by mouth. Historical Provider, MD Taking Active   sertraline (Zoloft) 25 mg tablet [id number] No Take 1 tablet (25 mg) by mouth once daily. Noa Coelho MD Taking  24 [id number]                  Allergies   Allergen Reactions    Erythromycin Diarrhea and Shortness of breath     "Amlodipine Other     Ankle edema in higher doses    Cephalexin Unknown and Other     sore mouth    Hydralazine Unknown     Chest pain    Hydrochlorothiazide Unknown    Lisinopril-Hydrochlorothiazide Other    Nifedipine Unknown    Streptomycin Unknown    Tetracyclines Unknown    Valsartan Unknown       /62   Pulse 64   Ht 1.562 m (5' 1.5\")   Wt 53.4 kg (117 lb 12.8 oz)   SpO2 97%   BMI 21.90 kg/m²     Physical Exam  Constitutional:       Appearance: Normal appearance.   HENT:      Head: Normocephalic and atraumatic.      Nose: Nose normal.   Eyes:      Extraocular Movements: Extraocular movements intact.      Pupils: Pupils are equal, round, and reactive to light.   Cardiovascular:      Rate and Rhythm: Normal rate and regular rhythm.   Pulmonary:      Breath sounds: Normal breath sounds.   Abdominal:      General: Abdomen is flat. Bowel sounds are normal.      Palpations: Abdomen is soft.   Musculoskeletal:      Right lower leg: No edema.      Left lower leg: No edema.   Neurological:      Mental Status: She is alert.           Assessment/Plan   Problem List Items Addressed This Visit       Esophageal reflux     Patient denies any heartburn or reflux symptoms recently.         Left ventricular diastolic dysfunction, NYHA class 1    Vaginal pessary present    Hypertension     Blood pressure is stable and well-controlled with Bystolic 10 mg daily.  This is also used for rate control for her A-fib.  She also takes amlodipine 5 mg twice daily for her hypertension.         Aortic regurgitation    Urinary incontinence    Paroxysmal atrial fibrillation (Multi) - Primary (Chronic)     Patient denies any more heart palpitations or fluttering like when she had in September.                     It has been a pleasure seeing you.  Noa Coelho MD    "

## 2024-11-18 ENCOUNTER — TELEPHONE (OUTPATIENT)
Dept: PRIMARY CARE | Facility: CLINIC | Age: 89
End: 2024-11-18
Payer: MEDICARE

## 2024-11-18 DIAGNOSIS — F41.1 ANXIETY STATE: ICD-10-CM

## 2024-11-18 RX ORDER — ALPRAZOLAM 0.25 MG/1
0.25 TABLET ORAL DAILY PRN
Qty: 90 TABLET | Refills: 0 | Status: CANCELLED | OUTPATIENT
Start: 2024-11-18

## 2024-11-18 NOTE — TELEPHONE ENCOUNTER
Pt wanted to make you aware that she is now on Eliquis. She also wanted to make sure that her appointment on 4/10/25 was okay and  you didn't want to see her sooner.

## 2024-12-02 ENCOUNTER — APPOINTMENT (OUTPATIENT)
Dept: PRIMARY CARE | Facility: CLINIC | Age: 89
End: 2024-12-02
Payer: MEDICARE

## 2024-12-03 ENCOUNTER — APPOINTMENT (OUTPATIENT)
Dept: PRIMARY CARE | Facility: CLINIC | Age: 89
End: 2024-12-03
Payer: MEDICARE

## 2024-12-10 ENCOUNTER — APPOINTMENT (OUTPATIENT)
Dept: PODIATRY | Facility: CLINIC | Age: 89
End: 2024-12-10
Payer: MEDICARE

## 2024-12-11 ENCOUNTER — PATIENT OUTREACH (OUTPATIENT)
Dept: PRIMARY CARE | Facility: CLINIC | Age: 89
End: 2024-12-11
Payer: MEDICARE

## 2024-12-11 DIAGNOSIS — E78.5 HYPERLIPIDEMIA, UNSPECIFIED HYPERLIPIDEMIA TYPE: ICD-10-CM

## 2024-12-11 DIAGNOSIS — I10 PRIMARY HYPERTENSION: ICD-10-CM

## 2024-12-11 NOTE — PROGRESS NOTES
Chart reviewed prior to [facility] outreach. Patient states she is doing well. She had to cancel her appointment last week but is doing fine and rescheduled it for 12/19. She says everything has been going fine and she is not in need of anything at this time. I will try to see her in person on the 19th. Nothing needed from me.

## 2024-12-19 ENCOUNTER — APPOINTMENT (OUTPATIENT)
Dept: PRIMARY CARE | Facility: CLINIC | Age: 89
End: 2024-12-19
Payer: MEDICARE

## 2024-12-19 VITALS
HEART RATE: 66 BPM | WEIGHT: 117.8 LBS | SYSTOLIC BLOOD PRESSURE: 161 MMHG | OXYGEN SATURATION: 98 % | HEIGHT: 62 IN | BODY MASS INDEX: 21.68 KG/M2 | DIASTOLIC BLOOD PRESSURE: 70 MMHG

## 2024-12-19 DIAGNOSIS — F41.1 GAD (GENERALIZED ANXIETY DISORDER): Primary | ICD-10-CM

## 2024-12-19 PROBLEM — Z13.31 DEPRESSION SCREEN: Status: RESOLVED | Noted: 2024-04-16 | Resolved: 2024-12-19

## 2024-12-19 PROBLEM — Z13.39 ALCOHOL SCREENING: Status: RESOLVED | Noted: 2024-04-16 | Resolved: 2024-12-19

## 2024-12-19 PROBLEM — Z13.89 SCREENING FOR MULTIPLE CONDITIONS: Status: RESOLVED | Noted: 2024-04-16 | Resolved: 2024-12-19

## 2024-12-19 PROBLEM — Z09 HOSPITAL DISCHARGE FOLLOW-UP: Status: RESOLVED | Noted: 2024-03-12 | Resolved: 2024-12-19

## 2024-12-19 PROCEDURE — 1157F ADVNC CARE PLAN IN RCRD: CPT | Performed by: INTERNAL MEDICINE

## 2024-12-19 PROCEDURE — 1159F MED LIST DOCD IN RCRD: CPT | Performed by: INTERNAL MEDICINE

## 2024-12-19 PROCEDURE — 1160F RVW MEDS BY RX/DR IN RCRD: CPT | Performed by: INTERNAL MEDICINE

## 2024-12-19 PROCEDURE — 99213 OFFICE O/P EST LOW 20 MIN: CPT | Performed by: INTERNAL MEDICINE

## 2024-12-19 PROCEDURE — 1036F TOBACCO NON-USER: CPT | Performed by: INTERNAL MEDICINE

## 2024-12-19 PROCEDURE — 1126F AMNT PAIN NOTED NONE PRSNT: CPT | Performed by: INTERNAL MEDICINE

## 2024-12-19 PROCEDURE — G2211 COMPLEX E/M VISIT ADD ON: HCPCS | Performed by: INTERNAL MEDICINE

## 2024-12-19 PROCEDURE — 3077F SYST BP >= 140 MM HG: CPT | Performed by: INTERNAL MEDICINE

## 2024-12-19 PROCEDURE — 3078F DIAST BP <80 MM HG: CPT | Performed by: INTERNAL MEDICINE

## 2024-12-19 RX ORDER — ALPRAZOLAM 0.25 MG/1
0.25 TABLET ORAL DAILY PRN
Qty: 30 TABLET | Refills: 0 | Status: SHIPPED | OUTPATIENT
Start: 2024-12-19 | End: 2025-08-16

## 2024-12-19 RX ORDER — DULOXETIN HYDROCHLORIDE 20 MG/1
20 CAPSULE, DELAYED RELEASE ORAL DAILY
Qty: 30 CAPSULE | Refills: 1 | Status: SHIPPED | OUTPATIENT
Start: 2024-12-19 | End: 2025-06-17

## 2024-12-19 ASSESSMENT — ENCOUNTER SYMPTOMS
TROUBLE SWALLOWING: 0
DIZZINESS: 0
ARTHRALGIAS: 0
FREQUENCY: 0
DIARRHEA: 0
NERVOUS/ANXIOUS: 1
PALPITATIONS: 0
COUGH: 0
DYSURIA: 0
ABDOMINAL PAIN: 0
FATIGUE: 0
NAUSEA: 0
SORE THROAT: 0
SLEEP DISTURBANCE: 0
SHORTNESS OF BREATH: 0
FEVER: 0
CONSTIPATION: 0
VOMITING: 0
LIGHT-HEADEDNESS: 0

## 2024-12-19 ASSESSMENT — PAIN SCALES - GENERAL: PAINLEVEL_OUTOF10: 0-NO PAIN

## 2024-12-19 NOTE — PROGRESS NOTES
Subjective   Patient ID: Gris Salcido is a 89 y.o. female who presents for medication refill and general health management.    Patient is here today for anxiety issues.  She has noticed that she ages that her anxiety just seems to be getting slowly worse over time.  Last year she was given Xanax or alprazolam 0.25 mg to take as needed but she would like to know if there is something she can take every day so she feels [place] in general.  We did try her on sertraline but she complained of side effects and did not like it at this point I believe an SNRI may be more appropriate.        Review of Systems   Constitutional:  Negative for fatigue and fever.   HENT:  Negative for sore throat and trouble swallowing.    Eyes:  Negative for visual disturbance.   Respiratory:  Negative for cough and shortness of breath.    Cardiovascular:  Negative for chest pain, palpitations and leg swelling.   Gastrointestinal:  Negative for abdominal pain, constipation, diarrhea, nausea and vomiting.   Genitourinary:  Negative for dysuria and frequency.   Musculoskeletal:  Negative for arthralgias.   Skin:  Negative for rash.   Neurological:  Negative for dizziness and light-headedness.   Psychiatric/Behavioral:  Negative for self-injury, sleep disturbance and suicidal ideas. The patient is nervous/anxious.        Objective   Medication Documentation Review Audit       Reviewed by Noa Coelho MD (Physician) on 12/19/24 at 1518      Medication Order Taking? Sig Documenting Provider Last Dose Status   acetaminophen (TylenoL) 325 mg tablet [id number] No Tylenol 325 MG Oral Tablet   Refills: 0       Active Historical Provider, MD Taking Active   ALPRAZolam (Xanax) 0.25 mg tablet [id number] No Take 1 tablet (0.25 mg) by mouth once daily as needed for anxiety (once daily as needed for anxiety). Noa Coelho MD Taking Active   amLODIPine (Norvasc) 5 mg tablet [id number]  Take 1 tablet (5 mg) by mouth 2 times a day. Noa Coelho MD  Active    amoxicillin (Amoxil) 500 mg capsule [id number] No TAKE FOUR CAPSULES BY MOUTH ONE HOUR BEFORE APPOINTMENT Historical Provider, MD Taking Active   apixaban (Eliquis) 2.5 mg tablet [id number]  Take 1 tablet (2.5 mg) by mouth 2 times a day. James C Ramicone, DO  Active   atorvastatin (Lipitor) 10 mg tablet [id number]  TAKE ONE-HALF TABLET BY MOUTH  ONCE DAILY Noa Coelho MD  Active   bacitracin-polymyxin B (Polysporin) ophthalmic ointment [id number] No 1 Application 4 times a day. Historical Provider, MD Taking Active   benazepril (Lotensin) 40 mg tablet [id number]  Take 1 tablet (40 mg) by mouth 2 times a day. Noa Coelho MD  Active   calcium carbonate 600 mg calcium (1,500 mg) tablet [id number] No Take 1 tablet (1,500 mg) by mouth 3 times a day. Historical Provider, MD Taking Active   cholecalciferol (Vitamin D-3) 25 MCG (1000 UT) capsule [id number] No TAKE 1 CAPSULE M, W,Fri, Sat Historical Provider, MD Taking Active   clobetasol (Temovate) 0.05 % cream [id number] No Apply cream topically to affected areas on the trunk & extremities twice daily until clear, then as needed for flares cover with thick moisturizer, avoid groin, face Historical Provider, MD Taking Active   estradiol (Estrace) 0.01 % (0.1 mg/gram) vaginal cream [id number] No  Historical Provider, MD Taking Active   famotidine (Pepcid) 20 mg tablet [id number] No Take 1 tablet (20 mg) by mouth 2 times a day. Historical Provider, MD Taking Active   fluticasone (Flonase) 50 mcg/actuation nasal spray [id number]  USE 1 SPRAY IN BOTH NOSTRILS  ONCE DAILY Noa Coelho MD  Active   furosemide (Lasix) 20 mg tablet [id number]  Take 0.5 tablets (10 mg) by mouth once daily. Noa Coelho MD  Active   ketoconazole (NIZOral) 2 % shampoo [id number] No WASH SCALP DAILY UNTIL CLEAR. THEN USE 2-3 TIMES A WEEK FOR MAINTENANCE, LET SIT FOR 3-5 MINUTES THEN RINSE Historical Provider, MD Taking Active   mometasone (Elocon) 0.1 % cream [id number] No APPLY CREAM TOPICALLY TWICE DAILY  "TO AFFECTED AREA ON LEG UNTIL RESOLVED, TAPER AS DIRECTED Historical Provider, MD Taking Active   nebivolol (Bystolic) 10 mg tablet [id number]  Take 1 tablet (10 mg) by mouth once daily. James C Ramicone,   Active   neomycin-polymyxin-dexAMETHasone (Maxitrol) 3.5mg/mL-10,000 unit/mL-0.1 % ophthalmic suspension [id number] No  Historical Provider, MD Taking Active   nitroglycerin (Nitrostat) 0.4 mg SL tablet [id number] No Place 1 tablet (0.4 mg) under the tongue every 5 minutes if needed for chest pain. For up to 3 doses. Call 911 if pain persists. Historical Provider, MD Taking Active   oxyquinoline-sod.lauryl sulfat (Trimo-Stovall Jelly) 0.025-0.01 % gel [id number] No Insert into the vagina. Historical Provider, MD Taking Active   potassium chloride CR 20 mEq ER tablet [id number]  Take 1 tablet (20 mEq) by mouth once a day on Monday, Wednesday, and Friday. Noa Coelho MD  Active   Premarin vaginal cream [id number] No USE 1G VAGINALLY ONCE DAILY AT BEDTIME AS DIRECTED Historical Provider, MD Taking Active   psyllium (Metamucil) powder [id number] No Take by mouth once daily. Historical Provider, MD Taking Active   psyllium husk, with sugar, (Metamucil, with sugar,) 3.4 gram/12 gram powder [id number] No Take by mouth. Historical Provider, MD Taking Active   sertraline (Zoloft) 25 mg tablet [id number] No Take 1 tablet (25 mg) by mouth once daily. Noa Coelho MD Taking Active                  Allergies   Allergen Reactions    Erythromycin Diarrhea and Shortness of breath    Amlodipine Other     Ankle edema in higher doses    Cephalexin Unknown and Other     sore mouth    Hydralazine Unknown     Chest pain    Hydrochlorothiazide Unknown    Lisinopril-Hydrochlorothiazide Other    Nifedipine Unknown    Streptomycin Unknown    Tetracyclines Unknown    Valsartan Unknown       /70   Pulse 66   Ht 1.562 m (5' 1.5\")   Wt 53.4 kg (117 lb 12.8 oz)   SpO2 98%   BMI 21.90 kg/m²     Physical Exam  Constitutional:       " Appearance: Normal appearance.   HENT:      Head: Normocephalic and atraumatic.      Nose: Nose normal.   Eyes:      Extraocular Movements: Extraocular movements intact.      Pupils: Pupils are equal, round, and reactive to light.   Cardiovascular:      Rate and Rhythm: Normal rate and regular rhythm.   Pulmonary:      Breath sounds: Normal breath sounds.   Abdominal:      General: Abdomen is flat. Bowel sounds are normal.      Palpations: Abdomen is soft.   Musculoskeletal:      Right lower leg: No edema.      Left lower leg: No edema.      Comments: Patient's back has significant scoliosis and kyphosis   Neurological:      Mental Status: She is alert.           Assessment/Plan   Problem List Items Addressed This Visit       [place] (generalized anxiety disorder) - Primary     Patient has noticed that she is aging that her anxiety just seems to be getting worse over time.  She starts worrying and worrying about things until she cannot get sleep and cannot get her mind around it.  She did not tolerate the sertraline.  Xanax works well but she would like something to take on a daily basis and since this is an addictive substance not recommended in the elderly we decided not to use Xanax.    At this time we are going to try her on duloxetine 20 mg daily and see her back in 1 month.  She was given a limited supply of Xanax or alprazolam just to use during severe anxiety episodes.  Patient states understanding and will comply and follow-up in 1 month         Relevant Medications    DULoxetine (Cymbalta) 20 mg DR capsule    ALPRAZolam (Xanax) 0.25 mg tablet              It has been a pleasure seeing you.  Noa Coelho MD

## 2024-12-19 NOTE — ASSESSMENT & PLAN NOTE
Patient has noticed that she is aging that her anxiety just seems to be getting worse over time.  She starts worrying and worrying about things until she cannot get sleep and cannot get her mind around it.  She did not tolerate the sertraline.  Xanax works well but she would like something to take on a daily basis and since this is an addictive substance not recommended in the elderly we decided not to use Xanax.    At this time we are going to try her on duloxetine 20 mg daily and see her back in 1 month.  She was given a limited supply of Xanax or alprazolam just to use during severe anxiety episodes.  Patient states understanding and will comply and follow-up in 1 month

## 2024-12-19 NOTE — PATIENT INSTRUCTIONS
Take alprazolam as needed for severe anxiety attacks    Take the duloxetine every day    Put a humidifier in bedroom    Follow up Dr Coelho in 1 month with 30 min appointment

## 2024-12-20 ENCOUNTER — TELEPHONE (OUTPATIENT)
Dept: CARDIOLOGY | Facility: CLINIC | Age: 89
End: 2024-12-20
Payer: MEDICARE

## 2024-12-20 NOTE — TELEPHONE ENCOUNTER
"Pt called on 12/19/24, states that she \"spit up a little blood\" early morning. She has had no more bleeding since. She is on eliquis 2.5 bid. She is asking if she should stop eliquis.     Discussed with Dr Ramicone, continue eliquis. I notified pt and asked her to let us know if she has any further issue.   "

## 2025-01-02 ENCOUNTER — TELEPHONE (OUTPATIENT)
Dept: PRIMARY CARE | Facility: CLINIC | Age: OVER 89
End: 2025-01-02
Payer: MEDICARE

## 2025-01-02 NOTE — TELEPHONE ENCOUNTER
Patient state she spoke with the pharmacy to stop the Cymbalta because she was becoming very tired.    Patient is no longer taking the Cymbalta  She said she will continue to take the Xanax as needed.    Patient also want to know if you know about the new Xanax XR (she said it is a time release)    Please advise

## 2025-01-21 ENCOUNTER — APPOINTMENT (OUTPATIENT)
Dept: PODIATRY | Facility: CLINIC | Age: OVER 89
End: 2025-01-21
Payer: MEDICARE

## 2025-01-23 ENCOUNTER — PATIENT OUTREACH (OUTPATIENT)
Dept: PRIMARY CARE | Facility: CLINIC | Age: OVER 89
End: 2025-01-23

## 2025-01-23 ENCOUNTER — APPOINTMENT (OUTPATIENT)
Dept: PRIMARY CARE | Facility: CLINIC | Age: OVER 89
End: 2025-01-23
Payer: MEDICARE

## 2025-01-23 VITALS
OXYGEN SATURATION: 96 % | HEIGHT: 62 IN | DIASTOLIC BLOOD PRESSURE: 66 MMHG | BODY MASS INDEX: 22.08 KG/M2 | SYSTOLIC BLOOD PRESSURE: 132 MMHG | WEIGHT: 120 LBS | HEART RATE: 64 BPM

## 2025-01-23 DIAGNOSIS — I10 PRIMARY HYPERTENSION: ICD-10-CM

## 2025-01-23 DIAGNOSIS — E78.5 HYPERLIPIDEMIA, UNSPECIFIED HYPERLIPIDEMIA TYPE: ICD-10-CM

## 2025-01-23 DIAGNOSIS — M15.0 PRIMARY OSTEOARTHRITIS INVOLVING MULTIPLE JOINTS: Primary | ICD-10-CM

## 2025-01-23 DIAGNOSIS — F41.1 GAD (GENERALIZED ANXIETY DISORDER): ICD-10-CM

## 2025-01-23 DIAGNOSIS — E55.9 VITAMIN D DEFICIENCY: ICD-10-CM

## 2025-01-23 DIAGNOSIS — I48.0 PAROXYSMAL ATRIAL FIBRILLATION (MULTI): Chronic | ICD-10-CM

## 2025-01-23 DIAGNOSIS — I50.9 CHRONIC HEART FAILURE, UNSPECIFIED HEART FAILURE TYPE: ICD-10-CM

## 2025-01-23 PROBLEM — N30.00 ACUTE CYSTITIS WITHOUT HEMATURIA: Status: RESOLVED | Noted: 2024-03-12 | Resolved: 2025-01-23

## 2025-01-23 PROBLEM — N39.0 URINARY TRACT INFECTION: Status: RESOLVED | Noted: 2024-09-03 | Resolved: 2025-01-23

## 2025-01-23 ASSESSMENT — ENCOUNTER SYMPTOMS
ABDOMINAL PAIN: 0
ARTHRALGIAS: 0
NAUSEA: 0
DYSURIA: 0
CONSTIPATION: 0
COUGH: 0
DIZZINESS: 0
SHORTNESS OF BREATH: 0
FATIGUE: 0
VOMITING: 0
TROUBLE SWALLOWING: 0
DIARRHEA: 0
SORE THROAT: 0
PALPITATIONS: 0
LIGHT-HEADEDNESS: 0
FEVER: 0
FREQUENCY: 0

## 2025-01-23 ASSESSMENT — PAIN SCALES - GENERAL: PAINLEVEL_OUTOF10: 0-NO PAIN

## 2025-01-23 NOTE — ASSESSMENT & PLAN NOTE
Patient has some low-level chronic heart failure however she has no ankle edema and no crackles in the lungs and no active failure at this time

## 2025-01-23 NOTE — ASSESSMENT & PLAN NOTE
Patient is now on duloxetine and tolerating the 20 mg dosage.  She will continue using as needed Xanax and if she needs a refill was reminded she must come in in person.

## 2025-01-23 NOTE — PATIENT INSTRUCTIONS
Follow up Dr Coelho in April with 45 min wellness exam    Get fasting labs the week before April appointment

## 2025-01-23 NOTE — ASSESSMENT & PLAN NOTE
Patient had some questions about her either atrial fibrillation.  I explained that she was on a beta-blocker to slow her heart rate down and blood thinner specifically at Eliquis to prevent clots and side effects like stroke or pulmonary emboli.  Patient appreciated the information

## 2025-01-23 NOTE — ASSESSMENT & PLAN NOTE
Blood pressure is mildly elevated at 132/66 however because of her advanced age we do not want tight control and will not make any changes at this time

## 2025-01-23 NOTE — PROGRESS NOTES
Subjective   Patient ID: Gris Salcido is a 89 y.o. female who presents for 1 month follow up for Afib, lipid, and HTN management.    Patient is here for 1 month follow-up on her anxiety disorder.  She started duloxetine but really did not like it.  She prefers the Xanax so we again reviewed its addictive potential and as long as she is using it in a limited fashion we will allow her to continue taking the Xanax as needed            Review of Systems   Constitutional:  Negative for fatigue and fever.   HENT:  Negative for sore throat and trouble swallowing.    Eyes:  Negative for visual disturbance.   Respiratory:  Negative for cough and shortness of breath.    Cardiovascular:  Negative for chest pain, palpitations and leg swelling.   Gastrointestinal:  Negative for abdominal pain, constipation, diarrhea, nausea and vomiting.   Genitourinary:  Negative for dysuria and frequency.   Musculoskeletal:  Negative for arthralgias.   Skin:  Negative for rash.   Neurological:  Negative for dizziness and light-headedness.       Objective   Medication Documentation Review Audit       Reviewed by Noa Coelho MD (Physician) on 01/23/25 at 1411      Medication Order Taking? Sig Documenting Provider Last Dose Status   acetaminophen (TylenoL) 325 mg tablet [id number] No Tylenol 325 MG Oral Tablet   Refills: 0       Active Historical Provider, MD Taking Active   ALPRAZolam (Xanax) 0.25 mg tablet [id number] No Take 1 tablet (0.25 mg) by mouth once daily as needed for anxiety (once daily as needed for anxiety). Noa Coelho MD Taking Active   ALPRAZolam (Xanax) 0.25 mg tablet [id number]  Take 1 tablet (0.25 mg) by mouth once daily as needed for anxiety (anxiety). Noa Coelho MD  Active   amLODIPine (Norvasc) 5 mg tablet [id number]  Take 1 tablet (5 mg) by mouth 2 times a day. Noa Coelho MD  Active   amoxicillin (Amoxil) 500 mg capsule [id number] No TAKE FOUR CAPSULES BY MOUTH ONE HOUR BEFORE APPOINTMENT Historical Provider, MD  Taking Active   apixaban (Eliquis) 2.5 mg tablet [id number]  Take 1 tablet (2.5 mg) by mouth 2 times a day. James C Ramicone,   Active   atorvastatin (Lipitor) 10 mg tablet [id number]  TAKE ONE-HALF TABLET BY MOUTH  ONCE DAILY Noa Coelho MD  Active   bacitracin-polymyxin B (Polysporin) ophthalmic ointment [id number] No 1 Application 4 times a day. Historical Provider, MD Taking Active   benazepril (Lotensin) 40 mg tablet [id number]  Take 1 tablet (40 mg) by mouth 2 times a day. Noa Coelho MD  Active   calcium carbonate 600 mg calcium (1,500 mg) tablet [id number] No Take 1 tablet (1,500 mg) by mouth 3 times a day. Historical Provider, MD Taking Active   cholecalciferol (Vitamin D-3) 25 MCG (1000 UT) capsule [id number] No TAKE 1 CAPSULE M, W,Fri, Sat Historical Provider, MD Taking Active   clobetasol (Temovate) 0.05 % cream [id number] No Apply cream topically to affected areas on the trunk & extremities twice daily until clear, then as needed for flares cover with thick moisturizer, avoid groin, face Historical Provider, MD Taking Active   DULoxetine (Cymbalta) 20 mg DR capsule [id number]  Take 1 capsule (20 mg) by mouth once daily. Do not crush or chew. Noa Coelho MD  Active   estradiol (Estrace) 0.01 % (0.1 mg/gram) vaginal cream [id number] No  Historical Provider, MD Taking Active   famotidine (Pepcid) 20 mg tablet [id number] No Take 1 tablet (20 mg) by mouth 2 times a day. Historical Provider, MD Taking Active   fluticasone (Flonase) 50 mcg/actuation nasal spray [id number]  USE 1 SPRAY IN BOTH NOSTRILS  ONCE DAILY Noa Coelho MD  Active   furosemide (Lasix) 20 mg tablet [id number]  Take 0.5 tablets (10 mg) by mouth once daily. Noa Coelho MD  Active   ketoconazole (NIZOral) 2 % shampoo [id number] No WASH SCALP DAILY UNTIL CLEAR. THEN USE 2-3 TIMES A WEEK FOR MAINTENANCE, LET SIT FOR 3-5 MINUTES THEN RINSE Historical Provider, MD Taking Active   mometasone (Elocon) 0.1 % cream [id number] No APPLY CREAM  "TOPICALLY TWICE DAILY TO AFFECTED AREA ON LEG UNTIL RESOLVED, TAPER AS DIRECTED Historical Provider, MD Taking Active   nebivolol (Bystolic) 10 mg tablet [id number]  Take 1 tablet (10 mg) by mouth once daily. James C Ramicone,   Active   neomycin-polymyxin-dexAMETHasone (Maxitrol) 3.5mg/mL-10,000 unit/mL-0.1 % ophthalmic suspension [id number] No  Historical Provider, MD Taking Active   nitroglycerin (Nitrostat) 0.4 mg SL tablet [id number] No Place 1 tablet (0.4 mg) under the tongue every 5 minutes if needed for chest pain. For up to 3 doses. Call 911 if pain persists. Historical Provider, MD Taking Active   oxyquinoline-sod.lauryl sulfat (Trimo-Stovall Jelly) 0.025-0.01 % gel [id number] No Insert into the vagina. Historical Provider, MD Taking Active   potassium chloride CR 20 mEq ER tablet [id number]  Take 1 tablet (20 mEq) by mouth once a day on Monday, Wednesday, and Friday. Noa Coelho MD  Active   Premarin vaginal cream [id number] No USE 1G VAGINALLY ONCE DAILY AT BEDTIME AS DIRECTED Historical Provider, MD Taking Active   psyllium (Metamucil) powder [id number] No Take by mouth once daily. Historical Provider, MD Taking Active   psyllium husk, with sugar, (Metamucil, with sugar,) 3.4 gram/12 gram powder [id number] No Take by mouth. Historical Provider, MD Taking Active                  Allergies   Allergen Reactions    Erythromycin Diarrhea and Shortness of breath    Amlodipine Other     Ankle edema in higher doses    Cephalexin Unknown and Other     sore mouth    Hydralazine Unknown     Chest pain    Hydrochlorothiazide Unknown    Lisinopril-Hydrochlorothiazide Other    Nifedipine Unknown    Streptomycin Unknown    Tetracyclines Unknown    Valsartan Unknown       /66   Pulse 64   Ht 1.562 m (5' 1.5\")   Wt 54.4 kg (120 lb)   SpO2 96%   BMI 22.31 kg/m²     Physical Exam  Constitutional:       Appearance: Normal appearance.   HENT:      Head: Normocephalic and atraumatic.      Nose: Nose normal.   Eyes:      " Extraocular Movements: Extraocular movements intact.      Pupils: Pupils are equal, round, and reactive to light.   Cardiovascular:      Rate and Rhythm: Normal rate and regular rhythm.   Pulmonary:      Breath sounds: Normal breath sounds.   Abdominal:      General: Abdomen is flat. Bowel sounds are normal.      Palpations: Abdomen is soft.   Musculoskeletal:      Right lower leg: No edema.      Left lower leg: No edema.   Neurological:      Mental Status: She is alert.           Assessment/Plan   Problem List Items Addressed This Visit       Osteoarthritis - Primary    Relevant Orders    TSH with reflex to Free T4 if abnormal    Vitamin D 25-Hydroxy,Total (for eval of Vitamin D levels)    Lipid Panel    Comprehensive Metabolic Panel    CBC    Hyperlipidemia    Relevant Orders    TSH with reflex to Free T4 if abnormal    Vitamin D 25-Hydroxy,Total (for eval of Vitamin D levels)    Lipid Panel    Comprehensive Metabolic Panel    CBC    Vitamin D deficiency    Relevant Orders    TSH with reflex to Free T4 if abnormal    Vitamin D 25-Hydroxy,Total (for eval of Vitamin D levels)    Lipid Panel    Comprehensive Metabolic Panel    CBC    Hypertension     Blood pressure is mildly elevated at 132/66 however because of her advanced age we do not want tight control and will not make any changes at this time         Relevant Orders    TSH with reflex to Free T4 if abnormal    Vitamin D 25-Hydroxy,Total (for eval of Vitamin D levels)    Lipid Panel    Comprehensive Metabolic Panel    CBC    Paroxysmal atrial fibrillation (Multi) (Chronic)     Patient had some questions about her either atrial fibrillation.  I explained that she was on a beta-blocker to slow her heart rate down and blood thinner specifically at [facility] to prevent clots and side effects like stroke or pulmonary emboli.  Patient appreciated the information         Relevant Orders    TSH with reflex to Free T4 if abnormal    Vitamin D 25-Hydroxy,Total (for eval of  Vitamin D levels)    Lipid Panel    Comprehensive Metabolic Panel    CBC    [place] (generalized anxiety disorder)     Patient is now on duloxetine and tolerating the 20 mg dosage.  She will continue using as needed Xanax and if she needs a refill was reminded she must come in in person.         Relevant Orders    TSH with reflex to Free T4 if abnormal    Vitamin D 25-Hydroxy,Total (for eval of Vitamin D levels)    Lipid Panel    Comprehensive Metabolic Panel    CBC    Chronic heart failure, unspecified heart failure type     Patient has some low-level chronic heart failure however she has no ankle edema and no crackles in the lungs and no active failure at this time                   It has been a pleasure seeing you.  Noa Coelho MD

## 2025-01-23 NOTE — PROGRESS NOTES
Chart reviewed prior to [facility] outreach. Patient states she is doing well right now. She has not been going outside very much lately because it has been so cold. She said she did have an episode the end of December where she coughed up a little blood so she called her cardiologist and asked if she should stop Eliquis. They wanted her to remain on it so she remained on Eliquis and has not had any bleeding since then. She thinks she might have had a cut in her mouth or something like that. Overall, she says she has been doing well. She has not noticed any problems with her heart/heart rate, she is getting around fine using her cane, and she said she has felt well. Patient saw Dr. Coelho today in the office. Nothing needed from me. Patient will see Dr. Coelho again in April.

## 2025-01-30 ENCOUNTER — TELEPHONE (OUTPATIENT)
Dept: PRIMARY CARE | Facility: CLINIC | Age: OVER 89
End: 2025-01-30
Payer: MEDICARE

## 2025-01-30 NOTE — TELEPHONE ENCOUNTER
Patient calling for an er follow up ,diagnosis was vertigo. She asked for 2/6/25 at 1:30 pm due to transportation issues. Her caretaker leaves at 3pm, so this time works. She also has a 2pm apt across the street on same day. Can she have the 1:30pm.

## 2025-02-06 ENCOUNTER — APPOINTMENT (OUTPATIENT)
Dept: PRIMARY CARE | Facility: CLINIC | Age: OVER 89
End: 2025-02-06
Payer: MEDICARE

## 2025-02-06 VITALS
DIASTOLIC BLOOD PRESSURE: 42 MMHG | WEIGHT: 119.2 LBS | SYSTOLIC BLOOD PRESSURE: 130 MMHG | BODY MASS INDEX: 21.94 KG/M2 | HEART RATE: 55 BPM | OXYGEN SATURATION: 96 % | HEIGHT: 62 IN

## 2025-02-06 DIAGNOSIS — R42 VERTIGO: ICD-10-CM

## 2025-02-06 DIAGNOSIS — I35.1 NONRHEUMATIC AORTIC VALVE INSUFFICIENCY: ICD-10-CM

## 2025-02-06 DIAGNOSIS — E87.1 HYPONATREMIA: Primary | ICD-10-CM

## 2025-02-06 DIAGNOSIS — E87.6 HYPOKALEMIA: ICD-10-CM

## 2025-02-06 DIAGNOSIS — I10 PRIMARY HYPERTENSION: ICD-10-CM

## 2025-02-06 DIAGNOSIS — E83.42 HYPOMAGNESEMIA: ICD-10-CM

## 2025-02-06 PROCEDURE — 1159F MED LIST DOCD IN RCRD: CPT | Performed by: INTERNAL MEDICINE

## 2025-02-06 PROCEDURE — 1126F AMNT PAIN NOTED NONE PRSNT: CPT | Performed by: INTERNAL MEDICINE

## 2025-02-06 PROCEDURE — 1157F ADVNC CARE PLAN IN RCRD: CPT | Performed by: INTERNAL MEDICINE

## 2025-02-06 PROCEDURE — 1036F TOBACCO NON-USER: CPT | Performed by: INTERNAL MEDICINE

## 2025-02-06 PROCEDURE — 3078F DIAST BP <80 MM HG: CPT | Performed by: INTERNAL MEDICINE

## 2025-02-06 PROCEDURE — 1160F RVW MEDS BY RX/DR IN RCRD: CPT | Performed by: INTERNAL MEDICINE

## 2025-02-06 PROCEDURE — 3075F SYST BP GE 130 - 139MM HG: CPT | Performed by: INTERNAL MEDICINE

## 2025-02-06 PROCEDURE — 99214 OFFICE O/P EST MOD 30 MIN: CPT | Performed by: INTERNAL MEDICINE

## 2025-02-06 RX ORDER — MECLIZINE HCL 25MG 25 MG/1
TABLET, CHEWABLE ORAL
COMMUNITY

## 2025-02-06 ASSESSMENT — ENCOUNTER SYMPTOMS
HEADACHES: 0
APPETITE CHANGE: 0
FEVER: 0
DIZZINESS: 0
DYSURIA: 0
BLOOD IN STOOL: 0
SHORTNESS OF BREATH: 0
DIFFICULTY URINATING: 0
FATIGUE: 1
CHILLS: 0
HEMATURIA: 0
NUMBNESS: 0
PALPITATIONS: 0
ACTIVITY CHANGE: 0
SPEECH DIFFICULTY: 0
CHEST TIGHTNESS: 0

## 2025-02-06 ASSESSMENT — PAIN SCALES - GENERAL: PAINLEVEL_OUTOF10: 0-NO PAIN

## 2025-02-06 NOTE — PATIENT INSTRUCTIONS
Please follow up with Dr. Coelho in April for 45 min wellness visit    Get blood work today

## 2025-02-06 NOTE — PROGRESS NOTES
"Subjective   Patient ID: Gris Salcido is a 89 y.o. female who presents for No chief complaint on file..  Patient presents for follow up s/p ED visit on 1/26/25. Patient states she woke up that morning with dizziness which continued even when she attempted to lay back down. She was able to walk to the bathroom and back and called EMS transport. Patient denies any fall while at home. At the ED, CT head was negative for acute bleed, but found to be hyponatremic and hypokalemic. Pt states her dizziness began to improve at the ED after receiving a dose of Antivert. Patient states she still has about 14 pills of Antivert left over and was advised to use it again if she feels dizzy. Patient counseled on importance of maintaining adequate hydration levels but in moderation so that she avoids becoming fluid overloaded.     Patient denies any episodes of dizziness since her ED visit. However, she does report she's feels like she's been \"dragging\" since the ED visit. Patient believes it's related to her BP regimen. In office her BP was 145/49 and 130/42 on repeat. Patient has upcoming appt in March with her cardiologist and was advised to discuss adjusting her regimen then. Patient denies any chest pain, shortness of breath, vision changes, palpitations currently.         Review of Systems   Constitutional:  Positive for fatigue. Negative for activity change, appetite change, chills and fever.   HENT:  Positive for hearing loss. Negative for ear discharge and ear pain.    Eyes:  Negative for visual disturbance.   Respiratory:  Negative for chest tightness and shortness of breath.    Cardiovascular:  Negative for chest pain and palpitations.   Gastrointestinal:  Negative for blood in stool.   Genitourinary:  Positive for vaginal discharge. Negative for difficulty urinating, dysuria and hematuria.        Admits to vaginal spotting, believes related to her pessary.   Neurological:  Negative for dizziness, speech difficulty, " numbness and headaches.       Objective   Medication Documentation Review Audit       Reviewed by Noa Coelho MD (Physician) on 02/06/25 at 1649      Medication Order Taking? Sig Documenting Provider Last Dose Status   acetaminophen (TylenoL) 325 mg tablet [id number]  Tylenol 325 MG Oral Tablet   Refills: 0       Active Estela Mejia MD  Active   ALPRAZolam (Xanax) 0.25 mg tablet [id number]  Take 1 tablet (0.25 mg) by mouth once daily as needed for anxiety (once daily as needed for anxiety). Noa Coelho MD  Active   ALPRAZolam (Xanax) 0.25 mg tablet [id number]  Take 1 tablet (0.25 mg) by mouth once daily as needed for anxiety (anxiety). Noa Coelho MD  Active   amLODIPine (Norvasc) 5 mg tablet [id number]  Take 1 tablet (5 mg) by mouth 2 times a day. Noa Coelho MD  Active   amoxicillin (Amoxil) 500 mg capsule [id number]  TAKE FOUR CAPSULES BY MOUTH ONE HOUR BEFORE APPOINTMENT Estela Provider, MD  Active   apixaban (Eliquis) 2.5 mg tablet [id number]  Take 1 tablet (2.5 mg) by mouth 2 times a day. James C Ramicone, DO  Active   atorvastatin (Lipitor) 10 mg tablet [id number]  TAKE ONE-HALF TABLET BY MOUTH  ONCE DAILY Noa Coelho MD  Active   bacitracin-polymyxin B (Polysporin) ophthalmic ointment [id number]  1 Application 4 times a day. Estela Mejia MD  Active   benazepril (Lotensin) 40 mg tablet [id number]  Take 1 tablet (40 mg) by mouth 2 times a day. Noa Coelho MD  Active   calcium carbonate 600 mg calcium (1,500 mg) tablet [id number]  Take 1 tablet (1,500 mg) by mouth 3 times a day. Estela Mejia MD  Active   cholecalciferol (Vitamin D-3) 25 MCG (1000 UT) capsule [id number]  TAKE 1 CAPSULE M, W,Fri, Sat Estela Mejia MD  Active   clobetasol (Temovate) 0.05 % cream [id number]  Apply cream topically to affected areas on the trunk & extremities twice daily until clear, then as needed for flares cover with thick moisturizer, avoid groin, face Historical MD Jackie  Active    DULoxetine (Cymbalta) 20 mg DR capsule [id number]  Take 1 capsule (20 mg) by mouth once daily. Do not crush or chew. Noa Coelho MD  Active   estradiol (Estrace) 0.01 % (0.1 mg/gram) vaginal cream [id number]   Historical Provider, MD  Active   famotidine (Pepcid) 20 mg tablet [id number]  Take 1 tablet (20 mg) by mouth 2 times a day. Historical Provider, MD  Active   fluticasone (Flonase) 50 mcg/actuation nasal spray [id number]  USE 1 SPRAY IN BOTH NOSTRILS  ONCE DAILY Noa Coelho MD  Active   furosemide (Lasix) 20 mg tablet [id number]  Take 0.5 tablets (10 mg) by mouth once daily. Noa Coelho MD  Active   ketoconazole (NIZOral) 2 % shampoo [id number]  WASH SCALP DAILY UNTIL CLEAR. THEN USE 2-3 TIMES A WEEK FOR MAINTENANCE, LET SIT FOR 3-5 MINUTES THEN RINSE Historical Provider, MD  Active   meclizine (Antivert) 25 mg tablet,chewable [id number]  CHEW AND SWALLOW 1 TABLET THREE TIMES DAILY AS NEEDED Historical Provider, MD  Active   mometasone (Elocon) 0.1 % cream [id number]  APPLY CREAM TOPICALLY TWICE DAILY TO AFFECTED AREA ON LEG UNTIL RESOLVED, TAPER AS DIRECTED Historical Provider, MD  Active   nebivolol (Bystolic) 10 mg tablet [id number]  Take 1 tablet (10 mg) by mouth once daily. James C Ramicone,   Active   neomycin-polymyxin-dexAMETHasone (Maxitrol) 3.5mg/mL-10,000 unit/mL-0.1 % ophthalmic suspension [id number]   Historical Provider, MD  Active   nitroglycerin (Nitrostat) 0.4 mg SL tablet [id number]  Place 1 tablet (0.4 mg) under the tongue every 5 minutes if needed for chest pain. For up to 3 doses. Call 911 if pain persists. Historical Provider, MD  Active   oxyquinoline-sod.lauryl sulfat (Trimo-Stovall Jelly) 0.025-0.01 % gel [id number]  Insert into the vagina. Historical Provider, MD  Active   potassium chloride CR 20 mEq ER tablet [id number]  Take 1 tablet (20 mEq) by mouth once a day on Monday, Wednesday, and Friday. Noa Coelho MD  Active   Premarin vaginal cream [id number]  USE 1G VAGINALLY ONCE DAILY AT  "BEDTIME AS DIRECTED Historical Provider, MD  Active   psyllium (Metamucil) powder [id number]  Take by mouth once daily. Historical Provider, MD  Active   psyllium husk, with sugar, (Metamucil, with sugar,) 3.4 gram/12 gram powder [id number]  Take by mouth. Historical Provider, MD  Active                  Allergies   Allergen Reactions    Erythromycin Diarrhea and Shortness of breath    Amlodipine Other     Ankle edema in higher doses    Cephalexin Unknown and Other     sore mouth    Hydralazine Unknown     Chest pain    Hydrochlorothiazide Unknown    Lisinopril-Hydrochlorothiazide Other    Nifedipine Unknown    Streptomycin Unknown    Tetracyclines Unknown    Valsartan Unknown       BP (!) 130/42   Pulse 55   Ht 1.562 m (5' 1.5\")   Wt 54.1 kg (119 lb 3.2 oz)   SpO2 96%   BMI 22.16 kg/m²     Physical Exam  Constitutional:       General: She is not in acute distress.     Appearance: Normal appearance. She is normal weight. She is not ill-appearing or toxic-appearing.   HENT:      Head: Normocephalic and atraumatic.      Mouth/Throat:      Mouth: Mucous membranes are moist.      Pharynx: Oropharynx is clear.   Eyes:      Pupils: Pupils are equal, round, and reactive to light.   Cardiovascular:      Rate and Rhythm: Bradycardia present. Rhythm irregular.      Heart sounds: Murmur heard.      Comments: Abnormal pulse via radial artery reported by student. On reassessment by Dr. Coelho, patient has normal rhythm.    1/6-2/6 systolic murmur.  Pulmonary:      Effort: Pulmonary effort is normal. No respiratory distress.      Breath sounds: Rhonchi present. No wheezing.      Comments: Rhonchi in right lower lobe.  Skin:     General: Skin is warm and dry.   Neurological:      General: No focal deficit present.      Mental Status: She is alert and oriented to person, place, and time. Mental status is at baseline.      Cranial Nerves: No cranial nerve deficit.      Coordination: Coordination normal.      Comments: CN VII " intact, no facial asymmetry.  Cerebellar: Finger-nose and heel-shin intact.   Psychiatric:         Mood and Affect: Mood normal.         Behavior: Behavior normal.         Thought Content: Thought content normal.         Judgment: Judgment normal.           Assessment/Plan   Problem List Items Addressed This Visit       Nonrheumatic aortic valve insufficiency    Hypertension     /49 and 130/42 today in office    -Continue BP regimen as prescribed, follow up with cardiologist scheduled for 3/3/25         Hypokalemia     ED labs 1/27: 3.4    -Repeat lab today, continue to monitor         Vertigo     Pt called EMS for new onset dizziness (see HPI)    -Continue to monitor fluid intake   -Take Antivert PRN for repeat episodes          Hypomagnesemia     ED labs 1/27: 1.8    -Repeat lab today, continue to monitor         Relevant Orders    Magnesium    Hyponatremia - Primary     ED visit 1/27: 133    -Repeat lab today, continue to monitor  -Continue to monitor fluid intake, no more than 2 of [facility] bottles per day         Relevant Orders    Comprehensive metabolic panel     This note or encounter for this patient visit included a student.  I have independently interviewed the patient, performed a physical exam and performed my own assessment and plan and orders.           It has been a pleasure seeing you.  Noa Coelho MD

## 2025-02-06 NOTE — ASSESSMENT & PLAN NOTE
/49 and 130/42 today in office    -Continue BP regimen as prescribed, follow up with cardiologist scheduled for 3/3/25

## 2025-02-06 NOTE — ASSESSMENT & PLAN NOTE
Pt called EMS for new onset dizziness (see HPI)    -Continue to monitor fluid intake   -Take Antivert PRN for repeat episodes     By the time of this office visit patient's vertigo was almost completely resolved.

## 2025-02-06 NOTE — ASSESSMENT & PLAN NOTE
ED visit 1/27: 133    -Repeat lab today, continue to monitor  -Continue to monitor fluid intake, no more than 2 of [facility] bottles per day

## 2025-02-07 LAB
ALBUMIN SERPL-MCNC: 4.5 G/DL (ref 3.6–5.1)
ALP SERPL-CCNC: 75 U/L (ref 37–153)
ALT SERPL-CCNC: 10 U/L (ref 6–29)
ANION GAP SERPL CALCULATED.4IONS-SCNC: 9 MMOL/L (CALC) (ref 7–17)
AST SERPL-CCNC: 18 U/L (ref 10–35)
BILIRUB SERPL-MCNC: 0.7 MG/DL (ref 0.2–1.2)
BUN SERPL-MCNC: 27 MG/DL (ref 7–25)
CALCIUM SERPL-MCNC: 10.1 MG/DL (ref 8.6–10.4)
CHLORIDE SERPL-SCNC: 97 MMOL/L (ref 98–110)
CO2 SERPL-SCNC: 28 MMOL/L (ref 20–32)
CREAT SERPL-MCNC: 1.01 MG/DL (ref 0.6–0.95)
EGFRCR SERPLBLD CKD-EPI 2021: 53 ML/MIN/1.73M2
GLUCOSE SERPL-MCNC: 101 MG/DL (ref 65–99)
MAGNESIUM SERPL-MCNC: 2.1 MG/DL (ref 1.5–2.5)
POTASSIUM SERPL-SCNC: 3.9 MMOL/L (ref 3.5–5.3)
PROT SERPL-MCNC: 7.5 G/DL (ref 6.1–8.1)
SODIUM SERPL-SCNC: 134 MMOL/L (ref 135–146)

## 2025-02-10 ENCOUNTER — TELEPHONE (OUTPATIENT)
Dept: PRIMARY CARE | Facility: CLINIC | Age: OVER 89
End: 2025-02-10
Payer: MEDICARE

## 2025-02-10 NOTE — TELEPHONE ENCOUNTER
----- Message from Noa Coelho sent at 2/9/2025  7:43 PM EST -----  Please notify patient her Potassium and Magnesium levels were normal.  Her other electrolyes like sodium were a little off BUT were better or the same as in the hospital.  Her kidney function was a little off as well but again this is chronic for her.  Nothing else needs to be done at this time.

## 2025-02-11 ENCOUNTER — APPOINTMENT (OUTPATIENT)
Dept: PODIATRY | Facility: CLINIC | Age: OVER 89
End: 2025-02-11
Payer: MEDICARE

## 2025-02-24 ENCOUNTER — PATIENT OUTREACH (OUTPATIENT)
Dept: PRIMARY CARE | Facility: CLINIC | Age: OVER 89
End: 2025-02-24
Payer: MEDICARE

## 2025-02-24 DIAGNOSIS — I10 PRIMARY HYPERTENSION: ICD-10-CM

## 2025-02-24 DIAGNOSIS — I50.9 CHRONIC HEART FAILURE, UNSPECIFIED HEART FAILURE TYPE: ICD-10-CM

## 2025-02-24 PROCEDURE — 99490 CHRNC CARE MGMT STAFF 1ST 20: CPT | Performed by: INTERNAL MEDICINE

## 2025-02-24 NOTE — PROGRESS NOTES
Chart reviewed prior to [facility] outreach. Patient states she is doing well right now. She met with Dr. Coelho the beginning of the month to follow up after her Emergency Room visit for dizziness. Patient states she has not had any dizziness since going to the ER. Dr. Coelho did some repeat blood work when she saw the patient because she had some electrolyte abnormalities while in the ER. Most of them improved since being in the ER and Dr. Coelho was no longer concerned about them. Patient has an appointment with Cardiology next week and then an appointment with Dr. Coelho in May. Nothing needed at this time.

## 2025-03-01 NOTE — PROGRESS NOTES
"    History Of Present Illness:      This is a 89-year-old female with history of hypertension and palpitations.  She presents for a follow-up evaluation.  The patient reports feeling more fatigued lately.  She has been taking nebivolol 10 mg daily.  No falls or syncopal episodes.  In the past she had a hospitalization at [facility] because of atrial fibrillation with RVR, and was placed on Eliquis 2.5 mg twice daily.    Review of Systems  Other review of systems negative  Last Recorded Vitals:      11/7/2024     2:02 PM 11/7/2024     2:15 PM 12/19/2024     3:04 PM 1/23/2025     1:54 PM 1/23/2025     4:19 PM 2/6/2025     1:35 PM 2/6/2025     2:29 PM   Vitals   Systolic 152 130 161 148 132 145 130   Diastolic 66 62 70 71 66 49 42   Heart Rate 64  66 64  55    Height 1.562 m (5' 1.5\")  1.562 m (5' 1.5\") 1.562 m (5' 1.5\")  1.562 m (5' 1.5\")    Weight (lb) 117.8  117.8 120  119.2    BMI 21.9 kg/m2  21.9 kg/m2 22.31 kg/m2  22.16 kg/m2    BSA (m2) 1.52 m2  1.52 m2 1.54 m2  1.53 m2    Visit Report Report Report Report Report Report Report Report     Allergies:  Erythromycin, Amlodipine, Cephalexin, Hydralazine, Hydrochlorothiazide, Lisinopril-hydrochlorothiazide, Nifedipine, Streptomycin, Tetracyclines, and Valsartan  Outpatient Medications:  Current Outpatient Medications   Medication Instructions    acetaminophen (TylenoL) 325 mg tablet Tylenol 325 MG Oral Tablet   Refills: 0       Active    ALPRAZolam (XANAX) 0.25 mg, oral, Daily PRN    ALPRAZolam (XANAX) 0.25 mg, oral, Daily PRN    amLODIPine (NORVASC) 5 mg, oral, 2 times daily    amoxicillin (Amoxil) 500 mg capsule TAKE FOUR CAPSULES BY MOUTH ONE HOUR BEFORE APPOINTMENT    apixaban (ELIQUIS) 2.5 mg, oral, 2 times daily    atorvastatin (LIPITOR) 5 mg, oral, Daily    bacitracin-polymyxin B (Polysporin) ophthalmic ointment 1 Application, 4 times daily    benazepril (LOTENSIN) 40 mg, oral, 2 times daily    calcium carbonate 600 mg calcium (1,500 mg) tablet 1 tablet, 3 times " daily    cholecalciferol (Vitamin D-3) 25 MCG (1000 UT) capsule TAKE 1 CAPSULE M, W,Fri, Sat    clobetasol (Temovate) 0.05 % cream Apply cream topically to affected areas on the trunk & extremities twice daily until clear, then as needed for flares cover with thick moisturizer, avoid groin, face    DULoxetine (CYMBALTA) 20 mg, oral, Daily, Do not crush or chew.    estradiol (Estrace) 0.01 % (0.1 mg/gram) vaginal cream     famotidine (Pepcid) 20 mg tablet 1 tablet, 2 times daily    fluticasone (Flonase) 50 mcg/actuation nasal spray 1 spray, Each Nostril, Daily    furosemide (LASIX) 10 mg, oral, Daily    ketoconazole (NIZOral) 2 % shampoo WASH SCALP DAILY UNTIL CLEAR. THEN USE 2-3 TIMES A WEEK FOR MAINTENANCE, LET SIT FOR 3-5 MINUTES THEN RINSE    meclizine (Antivert) 25 mg tablet,chewable CHEW AND SWALLOW 1 TABLET THREE TIMES DAILY AS NEEDED    mometasone (Elocon) 0.1 % cream APPLY CREAM TOPICALLY TWICE DAILY TO AFFECTED AREA ON LEG UNTIL RESOLVED, TAPER AS DIRECTED    nebivolol (BYSTOLIC) 10 mg, oral, Daily    neomycin-polymyxin-dexAMETHasone (Maxitrol) 3.5mg/mL-10,000 unit/mL-0.1 % ophthalmic suspension     nitroglycerin (NITROSTAT) 0.4 mg, Every 5 min PRN    oxyquinoline-sod.lauryl sulfat (Trimo-Stovall Jelly) 0.025-0.01 % gel Insert into the vagina.    potassium chloride CR 20 mEq ER tablet 20 mEq, oral, Every Mon/Wed/Fri    Premarin vaginal cream USE 1G VAGINALLY ONCE DAILY AT BEDTIME AS DIRECTED    psyllium (Metamucil) powder Daily RT    psyllium husk, with sugar, (Metamucil, with sugar,) 3.4 gram/12 gram powder Take by mouth.       Physical Exam:    General Appearance:  Alert, oriented, no distress  Skin:  Warm and dry  Head and Neck:  No elevation of JVP, no carotid bruits  Cardiac Exam:  Rhythm is regular, S1 and S2 are normal, no murmur S3 or S4  Lungs:  Clear to auscultation  Extremities:  no edema  Neurologic:  No focal deficits  Psychiatric:  Appropriate mood and behavior    Lab Results:    CMP:  Recent Labs  "    02/06/25  1441 09/23/24  1507 09/12/24  1422 05/16/24  0821 06/01/23  0906 09/21/22  1440 08/09/22  1150 05/18/22  0812 06/07/21  1109   * 134* 131* 136 136 136  --  138 135*   K 3.9 3.6 4.1 3.7 4.1 3.7 4.2 3.5 3.7   CL 97* 96* 94* 97* 99 97*  --  100 97*   CO2 28 26 27 28 28 27  --  30 29   ANIONGAP 9 16 14 15 13 16  --  12 13   BUN 27* 23 26* 13 14 12  --  13 13   CREATININE 1.01* 0.98 1.03 0.81 0.89 0.88  --  0.91 0.82   EGFR 53* 55* 52* 69  --   --   --   --   --    MG 2.1  --   --   --   --  1.93 1.90  --   --      Recent Labs     02/06/25  1441 05/16/24  0821 06/01/23  0906 05/18/22  0812 05/27/21  0825 05/06/20  0918 01/09/20  1148   ALBUMIN 4.5 4.1 4.3 4.1 4.3   < > 4.6   ALKPHOS 75 76 70 70 75   < > 71   ALT 10 12 9 12 13   < > 10   AST 18 18 18 16 18   < > 17   BILITOT 0.7 0.6 0.7 0.8 0.8   < > 1.1   LIPASE  --   --   --   --   --   --  38    < > = values in this interval not displayed.     CBC:  Recent Labs     05/16/24  0821 07/19/23  1357 07/07/23  1505 06/20/23  1457 06/01/23  0906 05/18/22  0812 05/27/21  0825 10/12/20  1122   WBC 7.3 10.8 10.0 10.3 6.6 7.9 6.4 9.3   HGB 12.0 12.6 11.6* 11.6* 11.3* 13.4 12.9 13.5   HCT 37.2 38.0 36.1 35.0* 35.4* 40.3 40.1 40.9    215 217 240 221 215 192 295   MCV 94 95 97 95 97 95 100 94     COAG: No results for input(s): \"PTT\", \"INR\", \"HAUF\", \"DDIMERVTE\", \"HAPTOGLOBIN\", \"FIBRINOGEN\" in the last 83624 hours.  Cardiology Tests (personally reviewed):    Echocardiogram August 2023: Normal left ventricular function, mild aortic valve regurgitation  Echocardiogram September 2024 [facility]: Normal left ventricular function, moderate tricuspid regurgitation and moderate aortic valve regurgitation    Assessment/Plan   Problem List Items Addressed This Visit             ICD-10-CM    Atrial tachycardia (CMS-HCC) I47.19    Relevant Medications    nebivolol (Bystolic) 10 mg tablet    Paroxysmal atrial fibrillation (Multi) (Chronic) I48.0     Gris is in sinus rhythm " today.  She has been feeling very fatigued which may be related to beta-blocker usage.  The nebivolol will be decreased to 5 mg daily.  Prior Holter monitoring showed an average heart rate of 68 bpm.  I do not feel that there is a pacemaker indication at this time.  EP follow-up in 8 to 10 months.         Relevant Medications    nebivolol (Bystolic) 10 mg tablet    High risk medication use - Primary Z79.899     No bleeding problems on Eliquis.            James C Ramicone, DO

## 2025-03-03 ENCOUNTER — OFFICE VISIT (OUTPATIENT)
Dept: CARDIOLOGY | Facility: CLINIC | Age: OVER 89
End: 2025-03-03
Payer: MEDICARE

## 2025-03-03 VITALS
BODY MASS INDEX: 21.53 KG/M2 | OXYGEN SATURATION: 98 % | WEIGHT: 117 LBS | SYSTOLIC BLOOD PRESSURE: 142 MMHG | DIASTOLIC BLOOD PRESSURE: 50 MMHG | HEART RATE: 36 BPM | HEIGHT: 62 IN

## 2025-03-03 DIAGNOSIS — I48.0 PAROXYSMAL ATRIAL FIBRILLATION (MULTI): Chronic | ICD-10-CM

## 2025-03-03 DIAGNOSIS — Z79.899 HIGH RISK MEDICATION USE: Primary | ICD-10-CM

## 2025-03-03 DIAGNOSIS — I47.19 ATRIAL TACHYCARDIA (CMS-HCC): ICD-10-CM

## 2025-03-03 PROCEDURE — 99214 OFFICE O/P EST MOD 30 MIN: CPT | Performed by: INTERNAL MEDICINE

## 2025-03-03 PROCEDURE — 3077F SYST BP >= 140 MM HG: CPT | Performed by: INTERNAL MEDICINE

## 2025-03-03 PROCEDURE — 1036F TOBACCO NON-USER: CPT | Performed by: INTERNAL MEDICINE

## 2025-03-03 PROCEDURE — 1157F ADVNC CARE PLAN IN RCRD: CPT | Performed by: INTERNAL MEDICINE

## 2025-03-03 PROCEDURE — 3078F DIAST BP <80 MM HG: CPT | Performed by: INTERNAL MEDICINE

## 2025-03-03 RX ORDER — NEBIVOLOL 10 MG/1
5 TABLET ORAL DAILY
Status: SHIPPED
Start: 2025-03-03 | End: 2026-03-03

## 2025-03-03 NOTE — ASSESSMENT & PLAN NOTE
Gris is in sinus rhythm today.  She has been feeling very fatigued which may be related to beta-blocker usage.  The nebivolol will be decreased to 5 mg daily.  Prior Holter monitoring showed an average heart rate of 68 bpm.  I do not feel that there is a pacemaker indication at this time.  EP follow-up in 8 to 10 months.

## 2025-03-03 NOTE — PATIENT INSTRUCTIONS
Decrease Nebivolol 10 mg tabs to 1/2 tab daily (5 mg daily).    Follow up with Dr Ramicone in 8-10 months.

## 2025-03-11 ENCOUNTER — APPOINTMENT (OUTPATIENT)
Dept: PODIATRY | Facility: CLINIC | Age: OVER 89
End: 2025-03-11
Payer: MEDICARE

## 2025-03-19 ENCOUNTER — TELEPHONE (OUTPATIENT)
Dept: CARDIOLOGY | Facility: CLINIC | Age: OVER 89
End: 2025-03-19
Payer: MEDICARE

## 2025-03-19 NOTE — TELEPHONE ENCOUNTER
Good morning,  Patient called office, she needs a appointment with Dr. Ramicone for a ER follow up in [place]. She would prefer in the afternoon. I offer with the PA and NP with like always she denied the offer and she would prefer see her Dr since she was in ED for palpitations. Please call patient for advise and for any appt available for her. Or I will call her if something is available.  Thanks  Althea

## 2025-03-27 ENCOUNTER — APPOINTMENT (OUTPATIENT)
Dept: PRIMARY CARE | Facility: CLINIC | Age: OVER 89
End: 2025-03-27
Payer: MEDICARE

## 2025-03-27 ENCOUNTER — PATIENT OUTREACH (OUTPATIENT)
Dept: PRIMARY CARE | Facility: CLINIC | Age: OVER 89
End: 2025-03-27

## 2025-03-27 VITALS
BODY MASS INDEX: 22.05 KG/M2 | SYSTOLIC BLOOD PRESSURE: 134 MMHG | OXYGEN SATURATION: 96 % | HEIGHT: 62 IN | DIASTOLIC BLOOD PRESSURE: 70 MMHG | WEIGHT: 119.8 LBS | HEART RATE: 66 BPM

## 2025-03-27 DIAGNOSIS — N30.00 ACUTE CYSTITIS WITHOUT HEMATURIA: ICD-10-CM

## 2025-03-27 DIAGNOSIS — I50.9 CHRONIC HEART FAILURE, UNSPECIFIED HEART FAILURE TYPE: ICD-10-CM

## 2025-03-27 DIAGNOSIS — I48.0 PAROXYSMAL ATRIAL FIBRILLATION (MULTI): ICD-10-CM

## 2025-03-27 DIAGNOSIS — R00.2 PALPITATIONS: Primary | Chronic | ICD-10-CM

## 2025-03-27 DIAGNOSIS — F41.1 GAD (GENERALIZED ANXIETY DISORDER): ICD-10-CM

## 2025-03-27 DIAGNOSIS — I10 PRIMARY HYPERTENSION: ICD-10-CM

## 2025-03-27 DIAGNOSIS — F41.1 ANXIETY STATE: ICD-10-CM

## 2025-03-27 PROBLEM — I51.89 GRADE I DIASTOLIC DYSFUNCTION: Status: ACTIVE | Noted: 2025-03-09

## 2025-03-27 RX ORDER — ALPRAZOLAM 0.25 MG/1
0.25 TABLET ORAL DAILY PRN
Qty: 90 TABLET | Refills: 0 | Status: SHIPPED | OUTPATIENT
Start: 2025-03-27

## 2025-03-27 ASSESSMENT — ENCOUNTER SYMPTOMS
PALPITATIONS: 0
FATIGUE: 0
DYSURIA: 0
NAUSEA: 0
DIZZINESS: 0
FEVER: 0
CONSTIPATION: 0
ARTHRALGIAS: 0
SHORTNESS OF BREATH: 0
FREQUENCY: 0
DIARRHEA: 0
SORE THROAT: 0
LIGHT-HEADEDNESS: 0
VOMITING: 0
COUGH: 0
ABDOMINAL PAIN: 0
TROUBLE SWALLOWING: 0

## 2025-03-27 ASSESSMENT — PATIENT HEALTH QUESTIONNAIRE - PHQ9
2. FEELING DOWN, DEPRESSED OR HOPELESS: NOT AT ALL
1. LITTLE INTEREST OR PLEASURE IN DOING THINGS: NOT AT ALL
SUM OF ALL RESPONSES TO PHQ9 QUESTIONS 1 AND 2: 0

## 2025-03-27 ASSESSMENT — PAIN SCALES - GENERAL: PAINLEVEL_OUTOF10: 0-NO PAIN

## 2025-03-27 NOTE — PROGRESS NOTES
"Care Management Monthly Outreach.   Chart reviewed prior to patient outreach.  Last Office Visit: 3/27/25  Next Office Visit: 5/14/25  Next Specialist Appt: Podiatry 4/8       Are there medication changes since last visit? No  Refills needed? No    Subjective     Patient states she is doing well. She was in the hospital 3/8-3/10 for UTI and weakness. Patient went home on an antibiotic. She has since finished the course of antibiotics and is having no urinary symptoms associated with UTI's.     Patient states she is feeling very weak. She said being in the hospital for 2 days just laying in bed really weakened her. She is still trying to recover from that. Besides feeling weak, patient is feeling well.    She saw Dr. Coelho today and patient had a caregiver with her from \"[employer]". This caregiver comes over every Thursday and will help with shopping and other necessary things around the house.    Care Gaps   You are overdue or due soon for the following Care Gaps:    Health Maintenance Due   Topic Date Due    Screening for Diabetes  10/05/2021         Objective     Last filed Vital Signs:    BP Readings from Last 6 Encounters:   03/27/25 134/70   03/03/25 142/50   02/06/25 (!) 130/42   01/23/25 132/66   12/19/24 161/70   11/07/24 130/62        Wt Readings from Last 6 Encounters:   03/27/25 54.3 kg (119 lb 12.8 oz)   03/03/25 53.1 kg (117 lb)   02/06/25 54.1 kg (119 lb 3.2 oz)   01/23/25 54.4 kg (120 lb)   12/19/24 53.4 kg (117 lb 12.8 oz)   11/07/24 53.4 kg (117 lb 12.8 oz)       BMI Readings from Last 6 Encounters:   03/27/25 22.27 kg/m²   03/03/25 21.75 kg/m²   02/06/25 22.16 kg/m²   01/23/25 22.31 kg/m²   12/19/24 21.90 kg/m²   11/07/24 21.90 kg/m²       Lab Review  Lab Results   Component Value Date    BILITOT 0.7 02/06/2025    CALCIUM 10.1 02/06/2025    CO2 28 02/06/2025    CL 97 (L) 02/06/2025    CREATININE 1.01 (H) 02/06/2025    GLUCOSE 101 (H) 02/06/2025    ALKPHOS 75 02/06/2025    K 3.9 02/06/2025    " PROT 7.5 02/06/2025     (L) 02/06/2025    AST 18 02/06/2025    ALT 10 02/06/2025    BUN 27 (H) 02/06/2025    ANIONGAP 9 02/06/2025    MG 2.1 02/06/2025    PHOS 3.5 04/16/2021    ALBUMIN 4.5 02/06/2025    AMYLASE 45 01/09/2020    LIPASE 38 01/09/2020    GFRF 62 06/01/2023     Lab Results   Component Value Date    TRIG 59 05/16/2024    CHOL 123 05/16/2024    LDLCALC 59 05/16/2024    HDL 52.4 05/16/2024     Lab Results   Component Value Date    HGBA1C 4.8 10/05/2020         Nothing else needed at this time.    Austin BASSN, RN, [facility]  Chronic Care Management  [phone]

## 2025-03-27 NOTE — ASSESSMENT & PLAN NOTE
Patient has completed the antibiotics and is now asymptomatic.  We can find no culture from [facility] only the urinalysis.  At this point since she is asymptomatic we will monitor her.

## 2025-03-27 NOTE — ASSESSMENT & PLAN NOTE
Patient did have some mild heart failure in the hospital but she has no clinical evidence of it at this time.  She has no shortness of breath.  Pulse ox was 96% on room air even after ambulating into the office.  She has no ankle edema and no crackles in the lungs.

## 2025-03-27 NOTE — PROGRESS NOTES
Subjective   Patient ID: Gris Salcido is a 89 y.o. female who presents for hospital follow up.    Patient is here for hospital follow-up and management of her atrial fibrillation and recent UTI.  Patient was feeling abnormal heart rhythm in her wrist and felt problems with infection and possible UTI so she went to the ER.  Once there she was admitted treated for urinary tract infection with an antibiotic which appears to be cefdinir and cardiac markers were followed.  Patient was not found to be in atrial fibrillation in the hospital.  She has completed the antibiotics and now has no symptoms.  While reviewing the records I see a urinalysis but do not see a urine culture.  Patient requests a refill on her Eliquis which we will be happy to send for her    Patient rarely uses rare alprazolam and sometimes only half a pill for when she is anxious or worked up.  Typically she uses it only a few times a week.  At this time I am willing to give her 90 pills without an agreement to see how long it lasts.  If she can make it last for the entire year she may not need any controlled substance agreement.  I reminded the patient that this is a controlled substance could be addictive and could cause lethargy or confusion especially in the elderly.  Patient is aware of it and will use it judiciously.        Review of Systems   Constitutional:  Negative for fatigue and fever.   HENT:  Negative for sore throat and trouble swallowing.    Eyes:  Negative for visual disturbance.   Respiratory:  Negative for cough and shortness of breath.    Cardiovascular:  Negative for chest pain, palpitations and leg swelling.   Gastrointestinal:  Negative for abdominal pain, constipation, diarrhea, nausea and vomiting.   Genitourinary:  Negative for dysuria and frequency.   Musculoskeletal:  Negative for arthralgias.   Skin:  Negative for rash.   Neurological:  Negative for dizziness and light-headedness.       Objective   Medication Documentation  Review Audit       Reviewed by Noa Coelho MD (Physician) on 02/06/25 at 1649      Medication Order Taking? Sig Documenting Provider Last Dose Status   acetaminophen (TylenoL) 325 mg tablet [id number]  Tylenol 325 MG Oral Tablet   Refills: 0       Active Estela Mejia MD  Active   ALPRAZolam (Xanax) 0.25 mg tablet [id number]  Take 1 tablet (0.25 mg) by mouth once daily as needed for anxiety (once daily as needed for anxiety). Noa Coelho MD  Active   ALPRAZolam (Xanax) 0.25 mg tablet [id number]  Take 1 tablet (0.25 mg) by mouth once daily as needed for anxiety (anxiety). Noa Coelho MD  Active   amLODIPine (Norvasc) 5 mg tablet [id number]  Take 1 tablet (5 mg) by mouth 2 times a day. Noa Coelho MD  Active   amoxicillin (Amoxil) 500 mg capsule [id number]  TAKE FOUR CAPSULES BY MOUTH ONE HOUR BEFORE APPOINTMENT Historical Provider, MD  Active   apixaban (Eliquis) 2.5 mg tablet [id number]  Take 1 tablet (2.5 mg) by mouth 2 times a day. James C Ramicone, DO  Active   atorvastatin (Lipitor) 10 mg tablet [id number]  TAKE ONE-HALF TABLET BY MOUTH  ONCE DAILY Noa Coelho MD  Active   bacitracin-polymyxin B (Polysporin) ophthalmic ointment [id number]  1 Application 4 times a day. Estela Mejia MD  Active   benazepril (Lotensin) 40 mg tablet [id number]  Take 1 tablet (40 mg) by mouth 2 times a day. Noa Coelho MD  Active   calcium carbonate 600 mg calcium (1,500 mg) tablet [id number]  Take 1 tablet (1,500 mg) by mouth 3 times a day. Estela Provider, MD  Active   cholecalciferol (Vitamin D-3) 25 MCG (1000 UT) capsule [id number]  TAKE 1 CAPSULE M, W,Fri, Sat Historical Provider, MD  Active   clobetasol (Temovate) 0.05 % cream [id number]  Apply cream topically to affected areas on the trunk & extremities twice daily until clear, then as needed for flares cover with thick moisturizer, avoid groin, face Historical Provider, MD  Active   DULoxetine (Cymbalta) 20 mg DR capsule [id number]  Take 1 capsule (20  mg) by mouth once daily. Do not crush or chew. Noa Coelho MD  Active   estradiol (Estrace) 0.01 % (0.1 mg/gram) vaginal cream [id number]   Historical Provider, MD  Active   famotidine (Pepcid) 20 mg tablet [id number]  Take 1 tablet (20 mg) by mouth 2 times a day. Historical Provider, MD  Active   fluticasone (Flonase) 50 mcg/actuation nasal spray [id number]  USE 1 SPRAY IN BOTH NOSTRILS  ONCE DAILY Noa Coelho MD  Active   furosemide (Lasix) 20 mg tablet [id number]  Take 0.5 tablets (10 mg) by mouth once daily. Noa Coelho MD  Active   ketoconazole (NIZOral) 2 % shampoo [id number]  WASH SCALP DAILY UNTIL CLEAR. THEN USE 2-3 TIMES A WEEK FOR MAINTENANCE, LET SIT FOR 3-5 MINUTES THEN RINSE Historical Provider, MD  Active   meclizine (Antivert) 25 mg tablet,chewable [id number]  CHEW AND SWALLOW 1 TABLET THREE TIMES DAILY AS NEEDED Historical Provider, MD  Active   mometasone (Elocon) 0.1 % cream [id number]  APPLY CREAM TOPICALLY TWICE DAILY TO AFFECTED AREA ON LEG UNTIL RESOLVED, TAPER AS DIRECTED Historical Provider, MD  Active   nebivolol (Bystolic) 10 mg tablet [id number]  Take 1 tablet (10 mg) by mouth once daily. James C Ramicone,   Active   neomycin-polymyxin-dexAMETHasone (Maxitrol) 3.5mg/mL-10,000 unit/mL-0.1 % ophthalmic suspension [id number]   Historical Provider, MD  Active   nitroglycerin (Nitrostat) 0.4 mg SL tablet [id number]  Place 1 tablet (0.4 mg) under the tongue every 5 minutes if needed for chest pain. For up to 3 doses. Call 911 if pain persists. Historical Provider, MD  Active   oxyquinoline-sod.lauryl sulfat (Trimo-Stovall Jelly) 0.025-0.01 % gel [id number]  Insert into the vagina. Historical Provider, MD  Active   potassium chloride CR 20 mEq ER tablet [id number]  Take 1 tablet (20 mEq) by mouth once a day on Monday, Wednesday, and Friday. Noa Coelho MD  Active   Premarin vaginal cream [id number]  USE 1G VAGINALLY ONCE DAILY AT BEDTIME AS DIRECTED Historical Provider, MD  Active   psyllium  "(Metamucil) powder [id number]  Take by mouth once daily. Historical Provider, MD  Active   psyllium husk, with sugar, (Metamucil, with sugar,) 3.4 gram/12 gram powder [id number]  Take by mouth. Historical Provider, MD  Active                  Allergies   Allergen Reactions    Erythromycin Diarrhea and Shortness of breath    Amlodipine Other     Ankle edema in higher doses    Cephalexin Unknown and Other     sore mouth    Hydralazine Unknown     Chest pain    Hydrochlorothiazide Unknown    Lisinopril-Hydrochlorothiazide Other    Nifedipine Unknown    Streptomycin Unknown    Tetracyclines Unknown    Valsartan Unknown       /70   Pulse 66   Ht 1.562 m (5' 1.5\")   Wt 54.3 kg (119 lb 12.8 oz)   SpO2 96%   BMI 22.27 kg/m²     Physical Exam  Constitutional:       Appearance: Normal appearance.   HENT:      Head: Normocephalic and atraumatic.      Nose: Nose normal.   Eyes:      Extraocular Movements: Extraocular movements intact.      Pupils: Pupils are equal, round, and reactive to light.   Cardiovascular:      Rate and Rhythm: Normal rate and regular rhythm.      Comments: Heart rate was regular and while I listen for extended period of time I did hear 2 extra beats presumed to be PVCs but otherwise it was regular rhythm  Pulmonary:      Breath sounds: Normal breath sounds.   Abdominal:      General: Abdomen is flat. Bowel sounds are normal.      Palpations: Abdomen is soft.   Musculoskeletal:      Right lower leg: No edema.      Left lower leg: No edema.   Neurological:      Mental Status: She is alert.           Assessment/Plan   Problem List Items Addressed This Visit       Hypertension     Pressure stable and well-controlled.         Palpitations - Primary (Chronic)    Acute cystitis without hematuria     Patient has completed the antibiotics and is now asymptomatic.  We can find no culture from [facility] only the urinalysis.  At this point since she is asymptomatic we will monitor her.         Paroxysmal " atrial fibrillation (Multi) (Chronic)     EKG will be performed in the office today since she is concerned.  This will give us a good baseline but I believe she is in sinus rhythm today.  She was given a refill on her Eliquis for anticoagulation and her heart rate was only 66 today.         Relevant Medications    apixaban (Eliquis) 2.5 mg tablet    Other Relevant Orders    ECG 12 lead (Clinic Performed) (Completed)    Chronic heart failure, unspecified heart failure type     Patient did have some mild heart failure in the hospital but she has no clinical evidence of it at this time.  She has no shortness of breath.  Pulse ox was 96% on room air even after ambulating into the office.  She has no ankle edema and no crackles in the lungs.          Other Visit Diagnoses       Anxiety state        Relevant Medications    ALPRAZolam (Xanax) 0.25 mg tablet                   It has been a pleasure seeing you.  Noa Coelho MD

## 2025-03-27 NOTE — ASSESSMENT & PLAN NOTE
EKG will be performed in the office today since she is concerned.  This will give us a good baseline but I believe she is in sinus rhythm today.  She was given a refill on her Eliquis for anticoagulation and her heart rate was only 66 today.

## 2025-04-08 ENCOUNTER — APPOINTMENT (OUTPATIENT)
Dept: PODIATRY | Facility: CLINIC | Age: OVER 89
End: 2025-04-08
Payer: MEDICARE

## 2025-04-08 DIAGNOSIS — M79.674 TOE PAIN, RIGHT: ICD-10-CM

## 2025-04-08 DIAGNOSIS — L84 CORNS AND CALLOSITIES: Primary | ICD-10-CM

## 2025-04-08 DIAGNOSIS — M79.672 LEFT FOOT PAIN: ICD-10-CM

## 2025-04-08 DIAGNOSIS — M79.671 RIGHT FOOT PAIN: ICD-10-CM

## 2025-04-08 DIAGNOSIS — B35.1 TINEA UNGUIUM: ICD-10-CM

## 2025-04-08 DIAGNOSIS — M79.675 TOE PAIN, LEFT: ICD-10-CM

## 2025-04-08 PROCEDURE — 11721 DEBRIDE NAIL 6 OR MORE: CPT | Performed by: PODIATRIST

## 2025-04-08 PROCEDURE — 1036F TOBACCO NON-USER: CPT | Performed by: PODIATRIST

## 2025-04-08 PROCEDURE — 11056 PARNG/CUTG B9 HYPRKR LES 2-4: CPT | Performed by: PODIATRIST

## 2025-04-08 PROCEDURE — 1157F ADVNC CARE PLAN IN RCRD: CPT | Performed by: PODIATRIST

## 2025-04-08 PROCEDURE — 1159F MED LIST DOCD IN RCRD: CPT | Performed by: PODIATRIST

## 2025-04-08 NOTE — PROGRESS NOTES
CC: painful thickened and elongated toenails     HPI:  Pt presents complaining painful thickened and elongated toenails that are difficult to manage.  Onset was gradual with worsening course until recently.  Aggravated by shoe gear and ambulation.         PCP: Dr. Coelho  Last visit: 3-27-25     PMH  Medical History           Past Medical History:   Diagnosis Date    Abrasion, left lower leg, initial encounter 07/24/2021     Abrasion, left lower leg, initial encounter    Acute maxillary sinusitis, unspecified 02/12/2015     Acute maxillary sinusitis    Acute upper respiratory infection, unspecified 02/25/2013     Acute upper respiratory infection    Cystocele, unspecified (CODE) 09/25/2018     Vaginal wall prolapse    Dizziness and giddiness 10/12/2020     Light headed    Encounter for follow-up examination after completed treatment for conditions other than malignant neoplasm 10/12/2020     Hospital discharge follow-up    Encounter for follow-up examination after completed treatment for conditions other than malignant neoplasm 02/21/2017     Hospital discharge follow-up    Encounter for follow-up examination after completed treatment for conditions other than malignant neoplasm 03/25/2021     Hospital discharge follow-up    Encounter for follow-up examination after completed treatment for conditions other than malignant neoplasm 10/12/2020     Hospital discharge follow-up    Encounter for immunization 09/28/2012     Need for prophylactic vaccination and inoculation against influenza    Major depressive disorder, single episode, moderate (CMS/HCC) 04/19/2021     Current moderate episode of major depressive disorder    Menopausal and female climacteric states 05/05/2020     Postmenopausal syndrome    Other conditions influencing health status       Basal Cell Carcinoma Of The Eyelid    Other conditions influencing health status       History of cough    Other conditions influencing health status 12/07/2021      History of cough    Other conditions influencing health status 02/11/2021     History of cough    Other conditions influencing health status 07/02/2013     Coronary Artery Disease    Other displaced fracture of upper end of left humerus, initial encounter for closed fracture 03/30/2021     Fracture of humeral head, left, closed    Other general symptoms and signs 12/21/2020     Cold intolerance    Other specified abnormal findings of blood chemistry 03/13/2022     Abnormal thyroid blood test    Other specified diseases of liver 10/05/2015     Liver cyst    Palpitations 04/27/2021     Intermittent palpitations    Paresthesia of skin 10/12/2020     Paresthesia of finger    Personal history of other (healed) physical injury and trauma 09/28/2012     History of strain of back    Personal history of other diseases of the digestive system 11/18/2015     History of gastroenteritis    Personal history of other diseases of the respiratory system 05/21/2016     History of sinusitis    Personal history of other drug therapy 09/19/2017     History of influenza vaccination    Personal history of other endocrine, nutritional and metabolic disease 11/18/2015     History of dehydration    Personal history of other infectious and parasitic diseases 01/18/2017     History of viral infection    Personal history of other infectious and parasitic diseases 12/01/2021     History of viral infection    Personal history of other specified conditions 03/09/2021     History of tachycardia    Personal history of other specified conditions 03/09/2021     History of fatigue    Personal history of other specified conditions 05/05/2020     History of insomnia    Personal history of other specified conditions 05/13/2020     History of nausea and vomiting    Personal history of other specified conditions 08/09/2021     History of palpitations    Pyuria 04/04/2021     Pyuria    Qualitative platelet defects (CMS/HCC)       Thrombocytopathy    Wedge  compression fracture of unspecified thoracic vertebra, initial encounter for closed fracture (CMS/[facility]) 05/05/2020     Compression fracture of thoracic vertebra    Wedge compression fracture of unspecified thoracic vertebra, initial encounter for closed fracture (CMS/[facility]) 07/02/2013     Compression fracture of thoracic vertebra         MEDS     Current Outpatient Medications:     acetaminophen (TylenoL) 325 mg tablet, Tylenol 325 MG Oral Tablet  Refills: 0     Active, Disp: , Rfl:     ALPRAZolam (Xanax) 0.25 mg tablet, Take 1 tablet (0.25 mg) by mouth once daily as needed for anxiety (once daily as needed for anxiety)., Disp: 90 tablet, Rfl: 0    amLODIPine (Norvasc) 5 mg tablet, Take 1 tablet (5 mg) by mouth 2 times a day., Disp: 180 tablet, Rfl: 3    amoxicillin (Amoxil) 500 mg capsule, TAKE FOUR CAPSULES BY MOUTH ONE HOUR BEFORE APPOINTMENT, Disp: , Rfl:     atorvastatin (Lipitor) 10 mg tablet, Take 0.5 tablets (5 mg) by mouth once daily., Disp: 90 tablet, Rfl: 3    bacitracin-polymyxin B (Polysporin) ophthalmic ointment, 1 Application 4 times a day., Disp: , Rfl:     benazepril (Lotensin) 40 mg tablet, TAKE 1 TABLET BY MOUTH TWICE  DAILY, Disp: 180 tablet, Rfl: 0    calcium carbonate 600 mg calcium (1,500 mg) tablet, Take 1 tablet (1,500 mg) by mouth 3 times a day., Disp: , Rfl:     cholecalciferol (Vitamin D-3) 25 MCG (1000 UT) capsule, TAKE 1 CAPSULE M, W,Fri, Sat, Disp: , Rfl:     clobetasol (Temovate) 0.05 % cream, Apply cream topically to affected areas on the trunk & extremities twice daily until clear, then as needed for flares cover with thick moisturizer, avoid groin, face, Disp: , Rfl:     estradiol (Estrace) 0.01 % (0.1 mg/gram) vaginal cream, , Disp: , Rfl:     famotidine (Pepcid) 20 mg tablet, Take 1 tablet (20 mg) by mouth 2 times a day., Disp: , Rfl:     fluticasone (Flonase) 50 mcg/actuation nasal spray, USE 1 SPRAY IN BOTH NOSTRILS  ONCE DAILY, Disp: 16 g, Rfl: 0    furosemide (Lasix) 20 mg  tablet, Take 0.5 tablets (10 mg) by mouth once daily., Disp: 90 tablet, Rfl: 3    ketoconazole (NIZOral) 2 % shampoo, WASH SCALP DAILY UNTIL CLEAR. THEN USE 2-3 TIMES A WEEK FOR MAINTENANCE, LET SIT FOR 3-5 MINUTES THEN RINSE, Disp: , Rfl:     mometasone (Elocon) 0.1 % cream, APPLY CREAM TOPICALLY TWICE DAILY TO AFFECTED AREA ON LEG UNTIL RESOLVED, TAPER AS DIRECTED, Disp: , Rfl:     nebivolol (Bystolic) 5 mg tablet, Take 1 tablet (5 mg) by mouth once daily., Disp: 90 tablet, Rfl: 3    neomycin-polymyxin-dexAMETHasone (Maxitrol) 3.5mg/mL-10,000 unit/mL-0.1 % ophthalmic suspension, , Disp: , Rfl:     nitroglycerin (Nitrostat) 0.4 mg SL tablet, Place 1 tablet (0.4 mg) under the tongue every 5 minutes if needed for chest pain. For up to 3 doses. Call 911 if pain persists., Disp: , Rfl:     oxyquinoline-sod.lauryl sulfat (Trimo-Stovall Jelly) 0.025-0.01 % gel, Insert into the vagina., Disp: , Rfl:     potassium chloride CR 20 mEq ER tablet, TAKE 1 TABLET BY MOUTH MONDAY,  WEDNESDAY, AND FRIDAY, Disp: 36 tablet, Rfl: 0    Premarin vaginal cream, USE 1G VAGINALLY ONCE DAILY AT BEDTIME AS DIRECTED, Disp: , Rfl:     psyllium (Metamucil) powder, Take by mouth once daily., Disp: , Rfl:     psyllium husk, with sugar, (Metamucil, with sugar,) 3.4 gram/12 gram powder, Take by mouth., Disp: , Rfl:     sertraline (Zoloft) 25 mg tablet, Take 1 tablet (25 mg) by mouth once daily., Disp: 30 tablet, Rfl: 5  Allergies            Allergies   Allergen Reactions    Erythromycin Diarrhea and Shortness of breath    Amlodipine Other       Ankle edema in higher doses    Cephalexin Unknown and Other       sore mouth    Hydralazine Unknown       Chest pain    Hydrochlorothiazide Unknown    Lisinopril-Hydrochlorothiazide Other    Nifedipine Unknown    Streptomycin Unknown    Tetracyclines Unknown    Valsartan Unknown      Social History   Social History                Socioeconomic History    Marital status:        Spouse name: None    Number  of children: None    Years of education: None    Highest education level: None   Occupational History    None   Tobacco Use    Smoking status: Former       Packs/day: 0.50       Years: 10.00       Additional pack years: 0.00       Total pack years: 5.00       Types: Cigarettes       Start date:        Quit date:        Years since quittin.2       Passive exposure: Never    Smokeless tobacco: Never   Substance and Sexual Activity    Alcohol use: Yes       Comment: once in a while    Drug use: Never    Sexual activity: None   Other Topics Concern    None   Social History Narrative    None      Social Determinants of Health      Financial Resource Strain: Not on file   Food Insecurity: Not on file   Transportation Needs: Not on file   Physical Activity: Not on file   Stress: Not on file   Social Connections: Not on file   Intimate Partner Violence: Not on file   Housing Stability: Not on file         Family History               Family History   Problem Relation Name Age of Onset    Coronary artery disease Mother        Hypertension Mother        Coronary artery disease Father             Surgical History             Past Surgical History:   Procedure Laterality Date    BREAST LUMPECTOMY   2012     Breast Surgery Lumpectomy    HERNIA REPAIR   2012     Hernia Repair    OTHER SURGICAL HISTORY   2012     Ovarian Cystectomy    OTHER SURGICAL HISTORY   2012     Reported Hx Of Eye Surgery For Cataracts    OTHER SURGICAL HISTORY   2014     Vaginal Surgery    OTHER SURGICAL HISTORY   2014     Vaginal Surgery    TOTAL ABDOMINAL HYSTERECTOMY   2012     Total Abdominal Hysterectomy            REVIEW OF SYSTEMS     DERM:   + as noted in HPI.         Physical examination:   On General Observation: Patient is a pleasant, cooperative, well developed 89 y.o.  adult female. The patient is alert and oriented to time, place and person. Patient has normal affect and mood.  /56    Pulse 80   Temp 36.7 °C (98.1 °F)      Vascular:  DP and PT pulses are 1/4 b/l.  mild edema noted. mild varicosities b/l.  CFT  6-7 seconds to all digits bilateral.  Skin temperature is warm to warm from proximal to distal bilateral.       Muscular: Strength is 5/5 for all instrinsic and extrinsic muscle groups.      Neuro:  Proprioception present.   Sensation to vibration is  present. Protective sensation present  at all pedal sites via [place] 5.07 monofilament bilateral.  Light touch present bilateral.      Derm:    Decreased hair growth b/l le  Left toenails: 1-5 Brittleness, crumbling upon debridement, subungual debris, elongation, mycotic appearance, tenderness, and thickness.   Right toenails: 1-5 Brittleness, crumbling upon debridement, subungual debris, elongation, mycotic appearance, tenderness, and thickness.   Callus is present plantar feet b/l le     ASSESSMENT:    Callus b/l feet  Pain feet  Tinea Unguium [B35.1]   Pain in right toe(s) [M79.674]   Pain in left toe(s) [M79.675]         PLAN:    -Debrided calluses plantar feet with sharp debridement  - Debrided toenails 1-10 in length and height.   - Follow up in 9-12 weeks.         Bethel Spencer DPM

## 2025-04-10 ENCOUNTER — APPOINTMENT (OUTPATIENT)
Dept: PRIMARY CARE | Facility: CLINIC | Age: OVER 89
End: 2025-04-10
Payer: MEDICARE

## 2025-04-14 ENCOUNTER — TELEPHONE (OUTPATIENT)
Dept: CARDIOLOGY | Facility: CLINIC | Age: OVER 89
End: 2025-04-14
Payer: MEDICARE

## 2025-04-14 NOTE — TELEPHONE ENCOUNTER
Pt called today requesting a short supply of eliquis. She only has enough pills to get her to Thursday. She is waiting on refill from [facility] pharmacy to arrive.    I called in 2 weeks worth of Eliquis. Pt notified.

## 2025-04-28 ENCOUNTER — PATIENT OUTREACH (OUTPATIENT)
Dept: PRIMARY CARE | Facility: CLINIC | Age: OVER 89
End: 2025-04-28
Payer: MEDICARE

## 2025-04-28 DIAGNOSIS — I10 PRIMARY HYPERTENSION: ICD-10-CM

## 2025-04-28 DIAGNOSIS — I50.9 CHRONIC HEART FAILURE, UNSPECIFIED HEART FAILURE TYPE: ICD-10-CM

## 2025-04-28 PROCEDURE — 99490 CHRNC CARE MGMT STAFF 1ST 20: CPT | Performed by: INTERNAL MEDICINE

## 2025-04-28 NOTE — PROGRESS NOTES
Care Management Monthly Outreach  Chart review completed  Confirmation of at least 2 patient identifiers  Change in insurance? No    Has patient been to ER/Urgent Care since last outreach? No    Last Office Visit with PCP: 3/27/2025   Next Office Visit with PCP: 5/14/2025   APC Collaboration: n/a    Chronic Conditions and Outreach Summary:   Primary hypertension    Chronic heart failure, unspecified heart failure type    Patient states she is doing well right now. She spent a couple days in the hospital last month for a UTI and she said since then, she has felt increased weakness. She said she is able to get around her house just fine and does not use the cane, but she feels weak. She had physical therapy come out to the house a few times but the physical therapist said she met all her goals so she stopped coming by. Patient is supposed to be doing the exercises on her own at home but she has not been doing them. She said they are not difficult but she lacks the ambition to get up and do them.    She has an appointment on 5/14 with Dr. Coelho so I will see her that day. She is currently not in need of anything.    Medications:   Are there medication changes since last visit? No  Refills needed? No    Social Drivers of Health: Complete  Care Gaps Addressed? Complete  Care Plan addressed: Yes    Upcoming Appointments:   Future Appointments       Date / Time Provider Department Dept Phone    5/14/2025 2:15 PM (Arrive by 2:00 PM) Noa Coelho MD  [facility] [phone]    6/10/2025 1:40 PM Bethel Spencer DPM [facility] [phone]    11/17/2025 2:15 PM James C Ramicone, DO [facility] [phone]            Blood Pressures Reviewed  BP Readings from Last 3 Encounters:   03/27/25 134/70   03/03/25 142/50   02/06/25 (!) 130/42       Labs Reviewed:  Lab Results   Component Value Date    CREATININE 1.01 (H) 02/06/2025    GLUCOSE 101 (H) 02/06/2025    ALKPHOS  75 02/06/2025    K 3.9 02/06/2025    PROT 7.5 02/06/2025     (L) 02/06/2025    CALCIUM 10.1 02/06/2025    AST 18 02/06/2025    ALT 10 02/06/2025    BUN 27 (H) 02/06/2025    MG 2.1 02/06/2025    PHOS 3.5 04/16/2021    GFRF 62 06/01/2023     Lab Results   Component Value Date    TRIG 59 05/16/2024    CHOL 123 05/16/2024    LDLCALC 59 05/16/2024    HDL 52.4 05/16/2024     Lab Results   Component Value Date    HGBA1C 4.8 10/05/2020     Lab Results   Component Value Date    WBC 7.3 05/16/2024    RBC 3.98 (L) 05/16/2024    HGB 12.0 05/16/2024     05/16/2024       No other concerns at this time.  Agreeable to continue monthly outreaches.  Encouraged to call if questions or concerns arise.    Austin BASSN, RN, [facility]  [phone]

## 2025-05-08 ENCOUNTER — APPOINTMENT (OUTPATIENT)
Dept: PRIMARY CARE | Facility: CLINIC | Age: OVER 89
End: 2025-05-08
Payer: MEDICARE

## 2025-05-14 ENCOUNTER — PATIENT OUTREACH (OUTPATIENT)
Dept: PRIMARY CARE | Facility: CLINIC | Age: OVER 89
End: 2025-05-14

## 2025-05-14 ENCOUNTER — APPOINTMENT (OUTPATIENT)
Dept: PRIMARY CARE | Facility: CLINIC | Age: OVER 89
End: 2025-05-14
Payer: MEDICARE

## 2025-05-14 VITALS
BODY MASS INDEX: 21.75 KG/M2 | SYSTOLIC BLOOD PRESSURE: 113 MMHG | WEIGHT: 118.2 LBS | HEIGHT: 62 IN | OXYGEN SATURATION: 93 % | HEART RATE: 65 BPM | DIASTOLIC BLOOD PRESSURE: 65 MMHG

## 2025-05-14 DIAGNOSIS — Z66 DNR (DO NOT RESUSCITATE): Primary | ICD-10-CM

## 2025-05-14 DIAGNOSIS — Z71.89 ACP (ADVANCE CARE PLANNING): ICD-10-CM

## 2025-05-14 DIAGNOSIS — I10 PRIMARY HYPERTENSION: ICD-10-CM

## 2025-05-14 DIAGNOSIS — F41.1 GAD (GENERALIZED ANXIETY DISORDER): ICD-10-CM

## 2025-05-14 DIAGNOSIS — E78.5 HYPERLIPIDEMIA, UNSPECIFIED HYPERLIPIDEMIA TYPE: ICD-10-CM

## 2025-05-14 DIAGNOSIS — I48.0 PAROXYSMAL ATRIAL FIBRILLATION (MULTI): Chronic | ICD-10-CM

## 2025-05-14 DIAGNOSIS — K59.09 CONSTIPATION, CHRONIC: ICD-10-CM

## 2025-05-14 DIAGNOSIS — I50.9 CHRONIC HEART FAILURE, UNSPECIFIED HEART FAILURE TYPE: ICD-10-CM

## 2025-05-14 DIAGNOSIS — Z00.00 WELLNESS EXAMINATION: ICD-10-CM

## 2025-05-14 DIAGNOSIS — N39.46 MIXED STRESS AND URGE URINARY INCONTINENCE: ICD-10-CM

## 2025-05-14 PROBLEM — N30.00 ACUTE CYSTITIS WITHOUT HEMATURIA: Status: RESOLVED | Noted: 2024-03-12 | Resolved: 2025-05-14

## 2025-05-14 PROBLEM — R93.89 ABNORMAL CHEST X-RAY: Status: RESOLVED | Noted: 2024-03-12 | Resolved: 2025-05-14

## 2025-05-14 PROBLEM — E86.0 DEHYDRATION: Status: RESOLVED | Noted: 2024-03-12 | Resolved: 2025-05-14

## 2025-05-14 SDOH — HEALTH STABILITY: PHYSICAL HEALTH: ON AVERAGE, HOW MANY DAYS PER WEEK DO YOU ENGAGE IN MODERATE TO STRENUOUS EXERCISE (LIKE A BRISK WALK)?: 0 DAYS

## 2025-05-14 SDOH — ECONOMIC STABILITY: GENERAL
WHICH OF THE FOLLOWING WOULD YOU LIKE TO GET CONNECTED TO IN ORDER TO RECEIVE A DISCOUNT OR FOR FREE? (CHOOSE ALL THAT APPLY): NO ASSISTANCE NEEDED

## 2025-05-14 SDOH — HEALTH STABILITY: PHYSICAL HEALTH: ON AVERAGE, HOW MANY MINUTES DO YOU ENGAGE IN EXERCISE AT THIS LEVEL?: 0 MIN

## 2025-05-14 SDOH — ECONOMIC STABILITY: FOOD INSECURITY: WITHIN THE PAST 12 MONTHS, YOU WORRIED THAT YOUR FOOD WOULD RUN OUT BEFORE YOU GOT MONEY TO BUY MORE.: NEVER TRUE

## 2025-05-14 SDOH — ECONOMIC STABILITY: FOOD INSECURITY: WITHIN THE PAST 12 MONTHS, THE FOOD YOU BOUGHT JUST DIDN'T LAST AND YOU DIDN'T HAVE MONEY TO GET MORE.: NEVER TRUE

## 2025-05-14 SDOH — ECONOMIC STABILITY: INCOME INSECURITY: IN THE LAST 12 MONTHS, WAS THERE A TIME WHEN YOU WERE NOT ABLE TO PAY THE MORTGAGE OR RENT ON TIME?: NO

## 2025-05-14 SDOH — ECONOMIC STABILITY: GENERAL
WHICH OF THE FOLLOWING DO YOU KNOW HOW TO USE AND HAVE ACCESS TO EVERY DAY? (CHOOSE ALL THAT APPLY): DESKTOP COMPUTER, LAPTOP COMPUTER, OR TABLET WITH BROADBAND INTERNET CONNECTION;SMARTPHONE WITH CELLULAR DATA PLAN

## 2025-05-14 ASSESSMENT — ACTIVITIES OF DAILY LIVING (ADL)
BATHING: INDEPENDENT
USING TELEPHONE: INDEPENDENT
ADEQUATE_TO_COMPLETE_ADL: YES
DRESSING: INDEPENDENT
WALKS IN HOME: INDEPENDENT
FEEDING YOURSELF: INDEPENDENT
FEEDING: INDEPENDENT
GROOMING: INDEPENDENT
JUDGMENT_ADEQUATE_SAFELY_COMPLETE_DAILY_ACTIVITIES: YES
HEARING - LEFT EAR: FUNCTIONAL
EATING: INDEPENDENT
HEARING - RIGHT EAR: FUNCTIONAL
MANAGING FINANCES: NEEDS ASSISTANCE
DOING HOUSEWORK: NEEDS ASSISTANCE
TOILETING: INDEPENDENT
PILL BOX USED: YES
USING TRANSPORTATION: NEEDS ASSISTANCE
JUDGMENT_ADEQUATE_SAFELY_COMPLETE_DAILY_ACTIVITIES: YES
STIL DRIVING: YES
DRESSING YOURSELF: INDEPENDENT
PATIENT'S MEMORY ADEQUATE TO SAFELY COMPLETE DAILY ACTIVITIES?: YES
NEEDS ASSISTANCE WITH FOOD: INDEPENDENT
ADEQUATE_TO_COMPLETE_ADL: YES
BATHING: INDEPENDENT
GROCERY SHOPPING: NEEDS ASSISTANCE
PREPARING MEALS: NEEDS ASSISTANCE
TOILETING: INDEPENDENT
TAKING MEDICATION: INDEPENDENT

## 2025-05-14 ASSESSMENT — SOCIAL DETERMINANTS OF HEALTH (SDOH)
IN A TYPICAL WEEK, HOW MANY TIMES DO YOU TALK ON THE PHONE WITH FAMILY, FRIENDS, OR NEIGHBORS?: THREE TIMES A WEEK
HOW OFTEN DO YOU ATTENT MEETINGS OF THE CLUB OR ORGANIZATION YOU BELONG TO?: NEVER
WITHIN THE LAST YEAR, HAVE YOU BEEN HUMILIATED OR EMOTIONALLY ABUSED IN OTHER WAYS BY YOUR PARTNER OR EX-PARTNER?: NO
DO YOU BELONG TO ANY CLUBS OR ORGANIZATIONS SUCH AS CHURCH GROUPS UNIONS, FRATERNAL OR ATHLETIC GROUPS, OR SCHOOL GROUPS?: NO
HOW OFTEN DO YOU GET TOGETHER WITH FRIENDS OR RELATIVES?: THREE TIMES A WEEK
HOW HARD IS IT FOR YOU TO PAY FOR THE VERY BASICS LIKE FOOD, HOUSING, MEDICAL CARE, AND HEATING?: NOT HARD AT ALL
WITHIN THE LAST YEAR, HAVE YOU BEEN KICKED, HIT, SLAPPED, OR OTHERWISE PHYSICALLY HURT BY YOUR PARTNER OR EX-PARTNER?: NO
HOW HARD IS IT FOR YOU TO PAY FOR THE VERY BASICS LIKE FOOD, HOUSING, MEDICAL CARE, AND HEATING?: NOT HARD AT ALL
WITHIN THE LAST YEAR, HAVE YOU BEEN AFRAID OF YOUR PARTNER OR EX-PARTNER?: NO
HOW OFTEN DO YOU ATTEND CHURCH OR RELIGIOUS SERVICES?: NEVER
IN THE PAST 12 MONTHS, HAS THE ELECTRIC, GAS, OIL, OR WATER COMPANY THREATENED TO SHUT OFF SERVICE IN YOUR HOME?: NO
WITHIN THE LAST YEAR, HAVE TO BEEN RAPED OR FORCED TO HAVE ANY KIND OF SEXUAL ACTIVITY BY YOUR PARTNER OR EX-PARTNER?: NO

## 2025-05-14 ASSESSMENT — ENCOUNTER SYMPTOMS
VOMITING: 0
SHORTNESS OF BREATH: 0
NAUSEA: 0
TROUBLE SWALLOWING: 0
FREQUENCY: 0
FATIGUE: 1
DYSURIA: 0
FEVER: 0
LIGHT-HEADEDNESS: 0
SORE THROAT: 0
ABDOMINAL PAIN: 0
PALPITATIONS: 0
COUGH: 0
ARTHRALGIAS: 0
CONSTIPATION: 0
DIZZINESS: 0
DIARRHEA: 0

## 2025-05-14 ASSESSMENT — PATIENT HEALTH QUESTIONNAIRE - PHQ9
1. LITTLE INTEREST OR PLEASURE IN DOING THINGS: NOT AT ALL
SUM OF ALL RESPONSES TO PHQ9 QUESTIONS 1 AND 2: 0
2. FEELING DOWN, DEPRESSED OR HOPELESS: NOT AT ALL

## 2025-05-14 ASSESSMENT — LIFESTYLE VARIABLES
HOW MANY STANDARD DRINKS CONTAINING ALCOHOL DO YOU HAVE ON A TYPICAL DAY: 1 OR 2
SKIP TO QUESTIONS 9-10: 1
HOW OFTEN DO YOU HAVE A DRINK CONTAINING ALCOHOL: MONTHLY OR LESS
AUDIT-C TOTAL SCORE: 1
HOW OFTEN DO YOU HAVE SIX OR MORE DRINKS ON ONE OCCASION: NEVER

## 2025-05-14 ASSESSMENT — PAIN SCALES - GENERAL: PAINLEVEL_OUTOF10: 0-NO PAIN

## 2025-05-14 NOTE — ASSESSMENT & PLAN NOTE
Annual wellness completed today.  Patient remains independent in most of her ADLs and IADLs however her sons now help her with her finances and making sure everything gets paid every month.  Patient still drives but only short distances to a local store to get ice cream.  My office is 2 miles from her home and even 2 miles away she has her granddaughter drive her.  She is able to take a shower and do all her own personal hygiene still.    Advance care planning was discussed in depth today.  Patient's son's first Serafin and then Kaleb are power of  for healthcare and patient wishes to remain DNR Comfort Care arrest    Safety issues including a grab bar in the shower bathroom have all been met.

## 2025-05-14 NOTE — ASSESSMENT & PLAN NOTE
Patient is still having issues with chronic constipation.  She was instructed today to take Colace 100 mg twice a day scheduled every day no matter what.  She can then start taking MiraLAX on an as-needed basis if she does not have a bowel movement in more than 24 hours.  This was given to the patient in writing and hopefully she will stick to it so that she can relieve the constipation.

## 2025-05-14 NOTE — PROGRESS NOTES
"Subjective   Reason for Visit: Gris Salcido is an 89 y.o. female here for a Medicare Wellness visit.     Past Medical, Surgical, and Family History reviewed and updated in chart.    Reviewed all medications by prescribing practitioner or clinical pharmacist (such as prescriptions, OTCs, herbal therapies and supplements) and documented in the medical record.    Patient is here for annual wellness visit as well as management of her medical problems including hypertension GERD A-fib diastolic dysfunction and chronic constipation.  Overall patient complains of fatigue but is doing well    Extra time was spent today reviewing advance care planning and her DNR status what it meant and whether or not she wanted to keep it.          Patient Care Team:  Noa Coelho MD as PCP - General  Noa Coelho MD as PCP - [facility]  Austin Barillas as Care Manager (Case Management)  James C Ramicone, DO as Consulting Physician (Cardiology)  Bethel Spencer DPM as Consulting Physician (Podiatry)  Fatoumata Fish MD as Consulting Physician (Obstetrics and Gynecology)     Review of Systems   Constitutional:  Positive for fatigue. Negative for fever.   HENT:  Negative for sore throat and trouble swallowing.    Eyes:  Negative for visual disturbance.   Respiratory:  Negative for cough and shortness of breath.    Cardiovascular:  Negative for chest pain, palpitations and leg swelling.   Gastrointestinal:  Negative for abdominal pain, constipation, diarrhea, nausea and vomiting.   Genitourinary:  Negative for dysuria and frequency.   Musculoskeletal:  Negative for arthralgias.   Skin:  Negative for rash.   Neurological:  Negative for dizziness and light-headedness.       Objective   Vitals:  /65   Pulse 65   Ht 1.562 m (5' 1.5\")   Wt 53.6 kg (118 lb 3.2 oz)   SpO2 93%   BMI 21.97 kg/m²       Physical Exam  Constitutional:       Appearance: Normal appearance.   HENT:      Head: Normocephalic and " atraumatic.      Nose: Nose normal.   Eyes:      Extraocular Movements: Extraocular movements intact.      Pupils: Pupils are equal, round, and reactive to light.   Cardiovascular:      Rate and Rhythm: Normal rate and regular rhythm.   Pulmonary:      Breath sounds: Normal breath sounds.   Abdominal:      General: Abdomen is flat. Bowel sounds are normal.      Palpations: Abdomen is soft.   Musculoskeletal:      Right lower leg: No edema.      Left lower leg: No edema.      Comments: Patient has significant kyphosis around a buffalo hump in her upper back   Neurological:      Mental Status: She is alert.         Assessment & Plan  DNR (do not resuscitate)  Patient and I reviewed her DNR Comfort Care arrest status today and what it meant.  She still wants regular medical care but agrees to remain DNR Comfort Care arrest as she does not want CPR cardioversion or aggressive measures taken.  She also has a living will stating she does not want heroics.  She is willing to have CPAP or oxygen but does not want to be ventilated or resuscitated because of her advanced age and her current medical problems.         Paroxysmal atrial fibrillation (Multi)  Patient remains on Eliquis for her A-fib which she is not happy about but is taking         Primary hypertension         Hyperlipidemia, unspecified hyperlipidemia type         Wellness examination  Annual wellness completed today.  Patient remains independent in most of her ADLs and IADLs however her sons now help her with her finances and making sure everything gets paid every month.  Patient still drives but only short distances to a local store to get ice cream.  My office is 2 miles from her home and even 2 miles away she has her granddaughter drive her.  She is able to take a shower and do all her own personal hygiene still.    Advance care planning was discussed in depth today.  Patient's son's first Serafin and then Kaleb are power of  for healthcare and  patient wishes to remain DNR Comfort Care arrest    Safety issues including a grab bar in the shower bathroom have all been met.           ACP (advance care planning)  Approximately 18 to 19 minutes was spent reviewing her CODE STATUS and advance care planning.  She does have a living will and power of  on record with us.  Her son Serafin Salcido is her power of  for healthcare initially followed by her son Kaleb as first alternate.  Patient is currently DNR Comfort Care arrest and would wishes to remain DNR Comfort Care arrest.  She does not want to be on a ventilator but would be okay with CPAP, or oxygen.  She expects to be treated for medical problems such as pneumonia with antibiotics but does not want aggressive measures such as a tracheotomy tube feeds or ventilator.  Patient is aware that because of her advanced age and increasing medical problems like A-fib and heart failure that she may have a bad outcome and she really does not want to be a burden to others or dependent on others.         Mixed stress and urge urinary incontinence  Patient, incontinence issues are generally taking care of by her vaginal pessary.           WESTON (generalized anxiety disorder)  Patient notes her anxiety stable at this time.         Constipation, chronic  Patient is still having issues with chronic constipation.  She was instructed today to take Colace 100 mg twice a day scheduled every day no matter what.  She can then start taking MiraLAX on an as-needed basis if she does not have a bowel movement in more than 24 hours.  This was given to the patient in writing and hopefully she will stick to it so that she can relieve the constipation.

## 2025-05-14 NOTE — ASSESSMENT & PLAN NOTE
Patient and I reviewed her DNR Comfort Care arrest status today and what it meant.  She still wants regular medical care but agrees to remain DNR Comfort Care arrest as she does not want CPR cardioversion or aggressive measures taken.  She also has a living will stating she does not want heroics.  She is willing to have CPAP or oxygen but does not want to be ventilated or resuscitated because of her advanced age and her current medical problems.

## 2025-05-14 NOTE — PATIENT INSTRUCTIONS
Take colace 100mg twice a day, every day  Take Miralax if you go 24 hours without a bowel movement    Follow up Dr Coelho in 3 months with 30 min appointment for HTN etc

## 2025-05-14 NOTE — PROGRESS NOTES
Care Management Monthly Outreach  Chart review completed  Confirmation of at least 2 patient identifiers  Change in insurance? No    Has patient been to ER/Urgent Care since last outreach? No    Last Office Visit with PCP: 5/14/2025   Next Office Visit with PCP: 8/14/2025   APC Collaboration: n/a    Chronic Conditions and Outreach Summary:   Primary hypertension    Chronic heart failure, unspecified heart failure type    Patient states she is doing fine right now. She says she is still fatigued from being in the hospital. The last time I talked to her which was just a couple weeks ago, she said she is lacking the motivation/ambition to get up and do her physical therapy exercises which she said is still the case. I was talking to her in the room with Dr. Coelho and we pushed further and asked if she was lacking motivation to get out of the house and she said she has really been wanting to get out of the house to get [employer]. Dr. Coelho was going to do her Medicare Wellness visit and go over the PHQ-9 questions with her but it does not seem like patient is depressed. She told us she is not fond of aging.    Patient is still driving but she said when she drives she avoids busy roads and she does not drive at night.    She is still having problems with constipation. Dr. Coelho wrote in the patient instructions (per patient's request) instructions to take colace daily and add miralax when she does not have a bowel movement in 24 hours.    Medications:   Are there medication changes since last visit? No  Refills needed? No    Social Drivers of Health: Addressed in the last 6 months  Care Gaps Addressed? Yes  Care Plan addressed: Yes    Upcoming Appointments:   Future Appointments       Date / Time Provider Department Dept Phone    6/10/2025 1:40 PM Bethel Spencer DPM [facility] [phone]    8/14/2025 11:30 AM (Arrive by 11:15 AM) Noa Coelho MD  [facility] [phone]     11/17/2025 2:15 PM James C Ramicone, DO [facility] [phone]            Blood Pressures Reviewed  BP Readings from Last 3 Encounters:   05/14/25 113/65   03/27/25 134/70   03/03/25 142/50       Labs Reviewed:  Lab Results   Component Value Date    CREATININE 1.01 (H) 02/06/2025    GLUCOSE 101 (H) 02/06/2025    ALKPHOS 75 02/06/2025    K 3.9 02/06/2025    PROT 7.5 02/06/2025     (L) 02/06/2025    CALCIUM 10.1 02/06/2025    AST 18 02/06/2025    ALT 10 02/06/2025    BUN 27 (H) 02/06/2025    MG 2.1 02/06/2025    PHOS 3.5 04/16/2021    GFRF 62 06/01/2023     Lab Results   Component Value Date    TRIG 59 05/16/2024    CHOL 123 05/16/2024    LDLCALC 59 05/16/2024    HDL 52.4 05/16/2024     Lab Results   Component Value Date    HGBA1C 4.8 10/05/2020     Lab Results   Component Value Date    WBC 7.3 05/16/2024    RBC 3.98 (L) 05/16/2024    HGB 12.0 05/16/2024     05/16/2024       No other concerns at this time.  Agreeable to continue monthly outreaches.  Encouraged to call if questions or concerns arise.    Austin BASSN, RN, [facility]  [phone]

## 2025-05-14 NOTE — ASSESSMENT & PLAN NOTE
Approximately 18 to 19 minutes was spent reviewing her CODE STATUS and advance care planning.  She does have a living will and power of  on record with us.  Her son Serafin Salcido is her power of  for healthcare initially followed by her son Kaleb as first alternate.  Patient is currently DNR Comfort Care arrest and would wishes to remain DNR Comfort Care arrest.  She does not want to be on a ventilator but would be okay with CPAP, or oxygen.  She expects to be treated for medical problems such as pneumonia with antibiotics but does not want aggressive measures such as a tracheotomy tube feeds or ventilator.  Patient is aware that because of her advanced age and increasing medical problems like A-fib and heart failure that she may have a bad outcome and she really does not want to be a burden to others or dependent on others.

## 2025-05-20 DIAGNOSIS — I10 ESSENTIAL HYPERTENSION: ICD-10-CM

## 2025-05-22 ENCOUNTER — DOCUMENTATION (OUTPATIENT)
Dept: PRIMARY CARE | Facility: CLINIC | Age: OVER 89
End: 2025-05-22
Payer: MEDICARE

## 2025-05-22 ENCOUNTER — PATIENT OUTREACH (OUTPATIENT)
Dept: PRIMARY CARE | Facility: CLINIC | Age: OVER 89
End: 2025-05-22
Payer: MEDICARE

## 2025-05-22 RX ORDER — FUROSEMIDE 20 MG/1
10 TABLET ORAL DAILY
Qty: 45 TABLET | Refills: 3 | OUTPATIENT
Start: 2025-05-22

## 2025-05-22 NOTE — PROGRESS NOTES
Discharge Facility: [facility]  Discharge Diagnosis: Afib with RVR  Admission Date: 5/18/25  Discharge Date: 5/20/25    PCP Appointment Date: 5/29/25  Specialist Appointment Date: None  Hospital Encounter and Summary Linked: Yes Hospital Encounter   See discharge assessment below for further details        Wrap Up  Is the patient/caregiver familiar with Advance Care Planning?: Yes (5/22/2025 11:12 AM)  Would the patient like more information on Advance Care Planning?: No (5/22/2025 11:12 AM)  Wrap Up Additional Comments: Patient is feeling fine. She says she is trying to get up and move around as much as possible. Luli is going to get her medications today from [facility]. Patient will see Dr. Coelho on 5/29. (5/22/2025 11:12 AM)  Call End Time: 1126 (5/22/2025 11:12 AM)    Engagement  Call Start Time: 1112 (5/22/2025 11:12 AM)    Medications  Medications reviewed with patient/caregiver?: Yes (5/22/2025 11:12 AM)  Is the patient having any side effects they believe may be caused by any medication additions or changes?: No (5/22/2025 11:12 AM)  Does the patient have all medications ordered at discharge?: No (She is picking them up from [facility] today.) (5/22/2025 11:12 AM)  Care Management Interventions: No intervention needed (5/22/2025 11:12 AM)  Prescription Comments: Patient was prescribed zoloft, and magnesium oxide. They have her listed to start potassium chloride but it is the same dose she was already taking (5/22/2025 11:12 AM)  Is the patient taking all medications as directed (includes completed medication regime)?: Yes (5/22/2025 11:12 AM)  Care Management Interventions: Provided patient education (5/22/2025 11:12 AM)  Medication Comments: Patient was not sure if she should be taking an increased dose of potassium chloride or not. Her last potassium level was 3.7. I will ask Dr. Coelho. (5/22/2025 11:12 AM)    Appointments  Does the patient have a primary care provider?: Yes (5/22/2025 11:12 AM)  Care  Management Interventions: Verified appointment date/time/provider (5/22/2025 11:12 AM)  Has the patient kept scheduled appointments due by today?: Not applicable (5/22/2025 11:12 AM)  Care Management Interventions: Advised patient to keep appointment (5/22/2025 11:12 AM)    Self Management  What is the home health agency?: [facility] (5/22/2025 11:12 AM)  Has home health visited the patient within 72 hours of discharge?: Yes (5/22/2025 11:12 AM)    Patient Teaching  Does the patient have access to their discharge instructions?: Yes (5/22/2025 11:12 AM)  Care Management Interventions: Reviewed instructions with patient (5/22/2025 11:12 AM)  What is the patient's perception of their health status since discharge?: Improving (5/22/2025 11:12 AM)  Is the patient/caregiver able to teach back the hierarchy of who to call/visit for symptoms/problems? PCP, Specialist, Home Health nurse, Urgent Care, ED, 911: Yes (5/22/2025 11:12 AM)  Patient/Caregiver Education Comments: Patient has Luli (mark) there at her house helping her now and she will come help her daily like she did before she was admitted (5/22/2025 11:12 AM)

## 2025-05-28 ENCOUNTER — APPOINTMENT (OUTPATIENT)
Dept: PRIMARY CARE | Facility: CLINIC | Age: OVER 89
End: 2025-05-28
Payer: MEDICARE

## 2025-05-29 ENCOUNTER — APPOINTMENT (OUTPATIENT)
Dept: PRIMARY CARE | Facility: CLINIC | Age: OVER 89
End: 2025-05-29
Payer: MEDICARE

## 2025-05-29 VITALS
HEIGHT: 62 IN | HEART RATE: 66 BPM | WEIGHT: 117.4 LBS | DIASTOLIC BLOOD PRESSURE: 67 MMHG | BODY MASS INDEX: 21.6 KG/M2 | OXYGEN SATURATION: 95 % | SYSTOLIC BLOOD PRESSURE: 120 MMHG

## 2025-05-29 DIAGNOSIS — E83.42 HYPOMAGNESEMIA: ICD-10-CM

## 2025-05-29 DIAGNOSIS — I11.0 HYPERTENSIVE HEART DISEASE WITH HEART FAILURE: ICD-10-CM

## 2025-05-29 DIAGNOSIS — N18.31 CHRONIC KIDNEY DISEASE, STAGE 3A (MULTI): ICD-10-CM

## 2025-05-29 DIAGNOSIS — I47.19 ATRIAL TACHYCARDIA: ICD-10-CM

## 2025-05-29 DIAGNOSIS — I48.0 PAROXYSMAL ATRIAL FIBRILLATION (MULTI): Chronic | ICD-10-CM

## 2025-05-29 DIAGNOSIS — F41.1 GAD (GENERALIZED ANXIETY DISORDER): Primary | ICD-10-CM

## 2025-05-29 DIAGNOSIS — I50.9 HEART FAILURE, UNSPECIFIED HF CHRONICITY, UNSPECIFIED HEART FAILURE TYPE: ICD-10-CM

## 2025-05-29 DIAGNOSIS — Z09 HOSPITAL DISCHARGE FOLLOW-UP: ICD-10-CM

## 2025-05-29 DIAGNOSIS — E87.6 HYPOKALEMIA: ICD-10-CM

## 2025-05-29 PROCEDURE — 1160F RVW MEDS BY RX/DR IN RCRD: CPT | Performed by: INTERNAL MEDICINE

## 2025-05-29 PROCEDURE — 99495 TRANSJ CARE MGMT MOD F2F 14D: CPT | Performed by: INTERNAL MEDICINE

## 2025-05-29 PROCEDURE — 1159F MED LIST DOCD IN RCRD: CPT | Performed by: INTERNAL MEDICINE

## 2025-05-29 PROCEDURE — 3078F DIAST BP <80 MM HG: CPT | Performed by: INTERNAL MEDICINE

## 2025-05-29 PROCEDURE — 3074F SYST BP LT 130 MM HG: CPT | Performed by: INTERNAL MEDICINE

## 2025-05-29 PROCEDURE — G2211 COMPLEX E/M VISIT ADD ON: HCPCS | Performed by: INTERNAL MEDICINE

## 2025-05-29 PROCEDURE — 1036F TOBACCO NON-USER: CPT | Performed by: INTERNAL MEDICINE

## 2025-05-29 PROCEDURE — 1126F AMNT PAIN NOTED NONE PRSNT: CPT | Performed by: INTERNAL MEDICINE

## 2025-05-29 RX ORDER — NEBIVOLOL 2.5 MG/1
7.5 TABLET ORAL DAILY
Qty: 270 TABLET | Refills: 3 | Status: SHIPPED | OUTPATIENT
Start: 2025-05-29

## 2025-05-29 RX ORDER — LANOLIN ALCOHOL/MO/W.PET/CERES
400 CREAM (GRAM) TOPICAL 2 TIMES DAILY
COMMUNITY
Start: 2025-05-20 | End: 2025-06-19

## 2025-05-29 RX ORDER — SERTRALINE HYDROCHLORIDE 25 MG/1
25 TABLET, FILM COATED ORAL DAILY
Qty: 90 TABLET | Refills: 3 | Status: SHIPPED | OUTPATIENT
Start: 2025-05-29

## 2025-05-29 ASSESSMENT — ENCOUNTER SYMPTOMS
FREQUENCY: 0
LIGHT-HEADEDNESS: 0
PALPITATIONS: 0
COUGH: 0
ABDOMINAL PAIN: 0
ARTHRALGIAS: 0
DYSURIA: 0
TROUBLE SWALLOWING: 0
FATIGUE: 0
FEVER: 0
CONSTIPATION: 0
SHORTNESS OF BREATH: 0
DIZZINESS: 0
NAUSEA: 0
VOMITING: 0
SORE THROAT: 0
DIARRHEA: 0

## 2025-05-29 ASSESSMENT — PAIN SCALES - GENERAL: PAINLEVEL_OUTOF10: 0-NO PAIN

## 2025-05-29 NOTE — ASSESSMENT & PLAN NOTE
Patient is here for hospital follow-up.  She was admitted the 18th through the 20th for A-fib with RVR.  While in the hospital they tried to restart her on sertraline which she has not started taking it.  Everybody agrees that she has some depression and would benefit from it and after discussion with me today reviewing the benefits of both sertraline versus duloxetine she is going to restart sertraline.  I will see how she is doing on it and if she has any problems she is to call us

## 2025-05-29 NOTE — PROGRESS NOTES
Subjective   Patient ID: Gris Salcido is a 89 y.o. female who presents for No chief complaint on file..  Patient is here for hospital follow up for new onset A fib with RVR.  Patient 's medication list was updated and reviewed with her in detail.  We discussed her sertraline verses duloxetine.  Since she is on sertraline now she will continue it.        Review of Systems   Constitutional:  Negative for fatigue and fever.   HENT:  Negative for sore throat and trouble swallowing.    Eyes:  Negative for visual disturbance.   Respiratory:  Negative for cough and shortness of breath.    Cardiovascular:  Negative for chest pain, palpitations and leg swelling.   Gastrointestinal:  Negative for abdominal pain, constipation, diarrhea, nausea and vomiting.   Genitourinary:  Negative for dysuria and frequency.   Musculoskeletal:  Negative for arthralgias.   Skin:  Negative for rash.   Neurological:  Negative for dizziness and light-headedness.       Objective   Medication Documentation Review Audit       Reviewed by Noa Coelho MD (Physician) on 05/29/25 at 1409      Medication Order Taking? Sig Documenting Provider Last Dose Status   acetaminophen (TylenoL) 325 mg tablet [id number]  Tylenol 325 MG Oral Tablet   Refills: 0       Active Historical Provider, MD  Active   ALPRAZolam (Xanax) 0.25 mg tablet [id number]  Take 1 tablet (0.25 mg) by mouth once daily as needed for anxiety (anxiety). Noa Coelho MD  Active    Patient not taking:   Discontinued 05/29/25 1352   amLODIPine (Norvasc) 5 mg tablet [id number]  Take 1 tablet (5 mg) by mouth 2 times a day. Noa Coelho MD  Active   amoxicillin (Amoxil) 500 mg capsule [id number]  TAKE FOUR CAPSULES BY MOUTH ONE HOUR BEFORE APPOINTMENT Historical Provider, MD  Active   apixaban (Eliquis) 2.5 mg tablet [id number]  Take 1 tablet (2.5 mg) by mouth 2 times a day. Noa Coelho MD  Active   atorvastatin (Lipitor) 10 mg tablet [id number]  TAKE ONE-HALF TABLET BY MOUTH  ONCE DAILY  Noa Coelho MD  Active   bacitracin-polymyxin B (Polysporin) ophthalmic ointment [id number]  1 Application 4 times a day. Historical Provider, MD  Active   benazepril (Lotensin) 40 mg tablet [id number]  Take 1 tablet (40 mg) by mouth 2 times a day. Noa Coelho MD  Active   calcium carbonate 600 mg calcium (1,500 mg) tablet [id number]  Take 1 tablet (1,500 mg) by mouth 3 times a day. Historical Provider, MD  Active   cholecalciferol (Vitamin D-3) 25 MCG (1000 UT) capsule [id number]  TAKE 1 CAPSULE M, W,Fri, Sat Historical Provider, MD  Active   clobetasol (Temovate) 0.05 % cream [id number]  Apply cream topically to affected areas on the trunk & extremities twice daily until clear, then as needed for flares cover with thick moisturizer, avoid groin, face Historical Provider, MD  Active     Discontinued 05/29/25 1353   estradiol (Estrace) 0.01 % (0.1 mg/gram) vaginal cream [id number]   Historical Provider, MD  Active   famotidine (Pepcid) 20 mg tablet [id number]  Take 1 tablet (20 mg) by mouth 2 times a day. Historical Provider, MD  Active   fluticasone (Flonase) 50 mcg/actuation nasal spray [id number]  USE 1 SPRAY IN BOTH NOSTRILS  ONCE DAILY Noa Coelho MD  Active   furosemide (Lasix) 20 mg tablet [id number]  Take 0.5 tablets (10 mg) by mouth once daily. Noa Coelho MD  Active     Discontinued 05/29/25 1354   magnesium oxide (Mag-Ox) 400 mg (241.3 mg elemental) tablet [id number] Yes Take 1 tablet by mouth twice a day. Historical Provider, MD  Active   meclizine (Antivert) 25 mg tablet,chewable [id number]  CHEW AND SWALLOW 1 TABLET THREE TIMES DAILY AS NEEDED Historical Provider, MD  Active   mometasone (Elocon) 0.1 % cream [id number]  APPLY CREAM TOPICALLY TWICE DAILY TO AFFECTED AREA ON LEG UNTIL RESOLVED, TAPER AS DIRECTED Historical Provider, MD  Active     Discontinued 05/29/25 1401   nebivolol (Bystolic) 2.5 mg tablet [id number]  Take 3 tablets (7.5 mg) by mouth once daily. Noa Coelho MD  Active  "  neomycin-polymyxin-dexAMETHasone (Maxitrol) 3.5mg/mL-10,000 unit/mL-0.1 % ophthalmic suspension [id number]   Historical Provider, MD  Active   nitroglycerin (Nitrostat) 0.4 mg SL tablet [id number]  Place 1 tablet (0.4 mg) under the tongue every 5 minutes if needed for chest pain. For up to 3 doses. Call 911 if pain persists. Historical Provider, MD  Active   oxyquinoline-sod.lauryl sulfat (Trimo-Stovall Jelly) 0.025-0.01 % gel [id number]  Insert into the vagina. Historical Provider, MD  Active   potassium chloride CR 20 mEq ER tablet [id number]  Take 1 tablet (20 mEq) by mouth once a day on Monday, Wednesday, and Friday. Noa Coelho MD  Active   Premarin vaginal cream [id number]  USE 1G VAGINALLY ONCE DAILY AT BEDTIME AS DIRECTED Historical Provider, MD  Active   sertraline (Zoloft) 25 mg tablet [id number]  Take 1 tablet (25 mg) by mouth once daily. Noa Coelho MD  Active                  Allergies[1]    /67   Pulse 66   Ht 1.562 m (5' 1.5\")   Wt 53.3 kg (117 lb 6.4 oz)   SpO2 95%   BMI 21.82 kg/m²     Physical Exam  Constitutional:       Appearance: Normal appearance.   HENT:      Head: Normocephalic and atraumatic.      Nose: Nose normal.   Eyes:      Extraocular Movements: Extraocular movements intact.      Pupils: Pupils are equal, round, and reactive to light.   Cardiovascular:      Rate and Rhythm: Normal rate. Rhythm irregular.   Pulmonary:      Breath sounds: Normal breath sounds.   Abdominal:      General: Abdomen is flat. Bowel sounds are normal.      Palpations: Abdomen is soft.   Musculoskeletal:      Right lower leg: No edema.      Left lower leg: No edema.   Neurological:      Mental Status: She is alert.           Assessment/Plan   Problem List Items Addressed This Visit       Atrial tachycardia    Relevant Medications    nebivolol (Bystolic) 2.5 mg tablet    Hospital discharge follow-up    Patient is here for hospital follow-up.  She was admitted the 18th through the 20th for A-fib with " RVR.  While in the hospital they tried to restart her on sertraline which she has not started taking it.  Everybody agrees that she has some depression and would benefit from it and after discussion with me today reviewing the benefits of both sertraline versus duloxetine she is going to restart sertraline.  I will see how she is doing on it and if she has any problems she is to call us         Hypokalemia    Paroxysmal atrial fibrillation (Multi) (Chronic)    Patient has new onset atrial fibrillation.  She will be following up with Dr Ramicone her cardiologist next week.           Relevant Medications    nebivolol (Bystolic) 2.5 mg tablet    WESTON (generalized anxiety disorder) - Primary    Patient took 1 dose of sertraline in the past and had some side effects but is willing to try it again.  She is going to start the sertraline and if she has any issues she will let us know.         Relevant Medications    sertraline (Zoloft) 25 mg tablet    Hypomagnesemia              It has been a pleasure seeing you.  Noa Coelho MD       [1]   Allergies  Allergen Reactions    Erythromycin Diarrhea and Shortness of breath    Amlodipine Other     Ankle edema in higher doses    Cephalexin Unknown and Other     sore mouth    Hydralazine Unknown     Chest pain    Hydrochlorothiazide Unknown    Lisinopril-Hydrochlorothiazide Other    Nifedipine Unknown    Streptomycin Unknown    Tetracyclines Unknown    Valsartan Unknown

## 2025-05-29 NOTE — ASSESSMENT & PLAN NOTE
Patient took 1 dose of sertraline in the past and had some side effects but is willing to try it again.  She is going to start the sertraline and if she has any issues she will let us know.

## 2025-05-29 NOTE — ASSESSMENT & PLAN NOTE
Patient has new onset atrial fibrillation.  She will be following up with Dr Ramicone her cardiologist next week.

## 2025-06-02 ENCOUNTER — TELEPHONE (OUTPATIENT)
Dept: PRIMARY CARE | Facility: CLINIC | Age: OVER 89
End: 2025-06-02
Payer: MEDICARE

## 2025-06-02 NOTE — TELEPHONE ENCOUNTER
Fátima from [facility] OM to let you know that patient's SOC was late because Fátima-(occupational therapist)was on medical leave.   Patient is now in OT and doing well    She asked for a call back to verify we received her message but she did not leave a call back number

## 2025-06-06 ENCOUNTER — OFFICE VISIT (OUTPATIENT)
Dept: CARDIOLOGY | Facility: CLINIC | Age: OVER 89
End: 2025-06-06
Payer: MEDICARE

## 2025-06-06 VITALS
HEIGHT: 62 IN | OXYGEN SATURATION: 98 % | BODY MASS INDEX: 21.53 KG/M2 | WEIGHT: 117 LBS | HEART RATE: 40 BPM | SYSTOLIC BLOOD PRESSURE: 128 MMHG | DIASTOLIC BLOOD PRESSURE: 64 MMHG

## 2025-06-06 DIAGNOSIS — I10 PRIMARY HYPERTENSION: ICD-10-CM

## 2025-06-06 DIAGNOSIS — I47.19 ATRIAL TACHYCARDIA: ICD-10-CM

## 2025-06-06 DIAGNOSIS — I48.0 PAROXYSMAL ATRIAL FIBRILLATION (MULTI): Primary | Chronic | ICD-10-CM

## 2025-06-06 PROCEDURE — 1036F TOBACCO NON-USER: CPT | Performed by: INTERNAL MEDICINE

## 2025-06-06 PROCEDURE — 99212 OFFICE O/P EST SF 10 MIN: CPT | Performed by: INTERNAL MEDICINE

## 2025-06-06 PROCEDURE — 3078F DIAST BP <80 MM HG: CPT | Performed by: INTERNAL MEDICINE

## 2025-06-06 PROCEDURE — 1126F AMNT PAIN NOTED NONE PRSNT: CPT | Performed by: INTERNAL MEDICINE

## 2025-06-06 PROCEDURE — 3074F SYST BP LT 130 MM HG: CPT | Performed by: INTERNAL MEDICINE

## 2025-06-06 PROCEDURE — 99215 OFFICE O/P EST HI 40 MIN: CPT | Performed by: INTERNAL MEDICINE

## 2025-06-06 PROCEDURE — 1159F MED LIST DOCD IN RCRD: CPT | Performed by: INTERNAL MEDICINE

## 2025-06-06 RX ORDER — AMIODARONE HYDROCHLORIDE 200 MG/1
200 TABLET ORAL DAILY
Qty: 30 TABLET | Refills: 5 | Status: SHIPPED | OUTPATIENT
Start: 2025-06-06 | End: 2025-12-03

## 2025-06-06 RX ORDER — NEBIVOLOL 5 MG/1
5 TABLET ORAL DAILY
Qty: 90 TABLET | Refills: 3 | Status: SHIPPED | OUTPATIENT
Start: 2025-06-06 | End: 2026-06-06

## 2025-06-06 ASSESSMENT — PAIN SCALES - GENERAL: PAINLEVEL_OUTOF10: 0-NO PAIN

## 2025-06-06 NOTE — PATIENT INSTRUCTIONS
Change Bystolic to 5 mg once daily.    Start Amiodarone 200 mg once daily for 7 days, then decrease to 1/2 tab daily.    Follow up with HEATHER Arreola in 1-2 months.

## 2025-06-06 NOTE — PROGRESS NOTES
"    History Of Present Illness:      This is a 89-year-old female history of palpitations and hypertension.  She has a remote history of atrial fibrillation with RVR and was hospitalized at [facility].  At that time she was placed on Eliquis 2.5 mg twice daily.  The most recent hospitalization was from 8/18/2025 to May 20, 2025.  Medical records from [facility] reviewed today.  After being discharged from [facility], it was recommended that she increase the Bystolic to 7.5 mg daily.  She had an episode of atrial fibrillation 2 days ago but this was self-limited.  Her son is present for today's evaluation.    Review of Systems  Other review of systems negative  Last Recorded Vitals:      1/23/2025     4:19 PM 2/6/2025     1:35 PM 2/6/2025     2:29 PM 3/3/2025     1:54 PM 3/27/2025     3:32 PM 5/14/2025     2:13 PM 5/29/2025     1:41 PM   Vitals   Systolic 132 145 130 142 134 113 120   Diastolic 66 49 42 50 70 65 67   BP Location    Left arm      Heart Rate  55  36 66 65 66   Height  1.562 m (5' 1.5\")  1.562 m (5' 1.5\") 1.562 m (5' 1.5\") 1.562 m (5' 1.5\") 1.562 m (5' 1.5\")   Weight (lb)  119.2  117 119.8 118.2 117.4   BMI  22.16 kg/m2  21.75 kg/m2 22.27 kg/m2 21.97 kg/m2 21.82 kg/m2   BSA (m2)  1.53 m2  1.52 m2 1.53 m2 1.53 m2 1.52 m2   Visit Report Report Report Report Report Report Report Report     Allergies:  Erythromycin, Amlodipine, Cephalexin, Hydralazine, Hydrochlorothiazide, Lisinopril-hydrochlorothiazide, Nifedipine, Streptomycin, Tetracyclines, and Valsartan  Outpatient Medications:  Current Outpatient Medications   Medication Instructions    acetaminophen (TylenoL) 325 mg tablet Tylenol 325 MG Oral Tablet   Refills: 0       Active    ALPRAZolam (XANAX) 0.25 mg, oral, Daily PRN    amLODIPine (NORVASC) 5 mg, oral, 2 times daily    amoxicillin (Amoxil) 500 mg capsule TAKE FOUR CAPSULES BY MOUTH ONE HOUR BEFORE APPOINTMENT    apixaban (ELIQUIS) 2.5 mg, oral, 2 times daily    atorvastatin (LIPITOR) 5 mg, oral, Daily    " bacitracin-polymyxin B (Polysporin) ophthalmic ointment 1 Application, 4 times daily    benazepril (LOTENSIN) 40 mg, oral, 2 times daily    calcium carbonate 600 mg calcium (1,500 mg) tablet 1 tablet, 3 times daily    cholecalciferol (Vitamin D-3) 25 MCG (1000 UT) capsule TAKE 1 CAPSULE M, W,Fri, Sat    clobetasol (Temovate) 0.05 % cream Apply cream topically to affected areas on the trunk & extremities twice daily until clear, then as needed for flares cover with thick moisturizer, avoid groin, face    estradiol (Estrace) 0.01 % (0.1 mg/gram) vaginal cream     famotidine (Pepcid) 20 mg tablet 1 tablet, 2 times daily    fluticasone (Flonase) 50 mcg/actuation nasal spray 1 spray, Each Nostril, Daily    furosemide (LASIX) 10 mg, oral, Daily    magnesium oxide (MAG-OX) 400 mg, 2 times daily    meclizine (Antivert) 25 mg tablet,chewable CHEW AND SWALLOW 1 TABLET THREE TIMES DAILY AS NEEDED    mometasone (Elocon) 0.1 % cream APPLY CREAM TOPICALLY TWICE DAILY TO AFFECTED AREA ON LEG UNTIL RESOLVED, TAPER AS DIRECTED    nebivolol (BYSTOLIC) 7.5 mg, oral, Daily    neomycin-polymyxin-dexAMETHasone (Maxitrol) 3.5mg/mL-10,000 unit/mL-0.1 % ophthalmic suspension     nitroglycerin (NITROSTAT) 0.4 mg, Every 5 min PRN    oxyquinoline-sod.lauryl sulfat (Trimo-Stovall Jelly) 0.025-0.01 % gel Insert into the vagina.    potassium chloride CR 20 mEq ER tablet 20 mEq, oral, Every Mon/Wed/Fri    Premarin vaginal cream USE 1G VAGINALLY ONCE DAILY AT BEDTIME AS DIRECTED    sertraline (ZOLOFT) 25 mg, oral, Daily       Physical Exam:    General Appearance:  Alert, oriented, no distress  Skin:  Warm and dry  Head and Neck:  No elevation of JVP, no carotid bruits  Cardiac Exam:  Rhythm is regular, S1 and S2 are normal, no murmur S3 or S4  Lungs:  Clear to auscultation  Extremities:  no edema  Neurologic:  No focal deficits  Psychiatric:  Appropriate mood and behavior    Lab Results:    CMP:  Recent Labs     02/06/25  1441 09/23/24  1507  "09/12/24  1422 05/16/24  0821 06/01/23  0906 09/21/22  1440 08/09/22  1150 05/18/22  0812 06/07/21  1109   * 134* 131* 136 136 136  --  138 135*   K 3.9 3.6 4.1 3.7 4.1 3.7 4.2 3.5 3.7   CL 97* 96* 94* 97* 99 97*  --  100 97*   CO2 28 26 27 28 28 27  --  30 29   ANIONGAP 9 16 14 15 13 16  --  12 13   BUN 27* 23 26* 13 14 12  --  13 13   CREATININE 1.01* 0.98 1.03 0.81 0.89 0.88  --  0.91 0.82   EGFR 53* 55* 52* 69  --   --   --   --   --    MG 2.1  --   --   --   --  1.93 1.90  --   --      Recent Labs     02/06/25  1441 05/16/24  0821 06/01/23  0906 05/18/22  0812 05/27/21  0825 05/06/20  0918 01/09/20  1148   ALBUMIN 4.5 4.1 4.3 4.1 4.3   < > 4.6   ALKPHOS 75 76 70 70 75   < > 71   ALT 10 12 9 12 13   < > 10   AST 18 18 18 16 18   < > 17   BILITOT 0.7 0.6 0.7 0.8 0.8   < > 1.1   LIPASE  --   --   --   --   --   --  38    < > = values in this interval not displayed.     CBC:  Recent Labs     05/16/24  0821 07/19/23  1357 07/07/23  1505 06/20/23  1457 06/01/23  0906 05/18/22  0812 05/27/21  0825 10/12/20  1122   WBC 7.3 10.8 10.0 10.3 6.6 7.9 6.4 9.3   HGB 12.0 12.6 11.6* 11.6* 11.3* 13.4 12.9 13.5   HCT 37.2 38.0 36.1 35.0* 35.4* 40.3 40.1 40.9    215 217 240 221 215 192 295   MCV 94 95 97 95 97 95 100 94     COAG: No results for input(s): \"PTT\", \"INR\", \"HAUF\", \"DDIMERVTE\", \"HAPTOGLOBIN\", \"FIBRINOGEN\" in the last [id number] hours.  Cardiology Tests (personally reviewed):    Echocardiogram August 2023: Normal left ventricular function, mild aortic valve regurgitation  Echocardiogram September 2024 [facility]: Normal left ventricular function, moderate tricuspid regurgitation and moderate aortic valve regurgitation    Assessment/Plan   Problem List Items Addressed This Visit           ICD-10-CM    Atrial tachycardia I47.19    Relevant Medications    nebivolol (Bystolic) 5 mg tablet    Hypertension I10    Paroxysmal atrial fibrillation (Multi) - Primary (Chronic) I48.0    Gris is in sinus rhythm today, but she is " having more frequent recurrences of atrial fibrillation and has been symptomatic.  She has had 2 hospitalizations at [facility] recently, and another episode of atrial fibrillation occurred on June 5, 2025 but this was a self-limited episode.  I feel that the patient would benefit from the use of an antiarrhythmic drug for AF suppression.  Given her advanced age, amiodarone would be the safest medication to use for treatment of atrial fibrillation.  The potential side effects of amiodarone were discussed in detail with the patient and her son.  We will be monitoring thyroid function, hepatic function and periodic chest x-rays.  We will start amiodarone only at 200 mg once daily for 7 days, then decrease to 100 mg daily.  The Bystolic will be changed to 5 mg once daily.  I will arrange for a follow-up evaluation in 1 to 2 months.  The patient will see me as scheduled in November 2025.         Relevant Medications    nebivolol (Bystolic) 5 mg tablet    amiodarone (Pacerone) 200 mg tablet     James C Ramicone, DO

## 2025-06-06 NOTE — ASSESSMENT & PLAN NOTE
Gris is in sinus rhythm today, but she is having more frequent recurrences of atrial fibrillation and has been symptomatic.  She has had 2 hospitalizations at [facility] recently, and another episode of atrial fibrillation occurred on June 5, 2025 but this was a self-limited episode.  I feel that the patient would benefit from the use of an antiarrhythmic drug for AF suppression.  Given her advanced age, amiodarone would be the safest medication to use for treatment of atrial fibrillation.  The potential side effects of amiodarone were discussed in detail with the patient and her son.  We will be monitoring thyroid function, hepatic function and periodic chest x-rays.  We will start amiodarone only at 200 mg once daily for 7 days, then decrease to 100 mg daily.  The Bystolic will be changed to 5 mg once daily.  I will arrange for a follow-up evaluation in 1 to 2 months.  The patient will see me as scheduled in November 2025.

## 2025-06-10 ENCOUNTER — APPOINTMENT (OUTPATIENT)
Dept: PODIATRY | Facility: CLINIC | Age: OVER 89
End: 2025-06-10
Payer: MEDICARE

## 2025-06-11 ENCOUNTER — TELEPHONE (OUTPATIENT)
Dept: PRIMARY CARE | Facility: CLINIC | Age: OVER 89
End: 2025-06-11
Payer: MEDICARE

## 2025-06-11 NOTE — TELEPHONE ENCOUNTER
Patient said she took Zoloft for 6 days and stop taking it because it made her have nausea and the magnesium gave her diarrhea     She said she ask the pharmacy if she can just stop taking the both and was told yes    Patient want to know if she can cut down on the magnesium would that help with the diarrhea    Please advise

## 2025-06-12 NOTE — TELEPHONE ENCOUNTER
Pt called back in, she wanted to report that she will take the magnesium Monday, Wednesday and Fridays, that is the easiest way for her.

## 2025-06-18 ENCOUNTER — PATIENT OUTREACH (OUTPATIENT)
Dept: PRIMARY CARE | Facility: CLINIC | Age: OVER 89
End: 2025-06-18
Payer: MEDICARE

## 2025-06-18 DIAGNOSIS — I50.9 HEART FAILURE, UNSPECIFIED HF CHRONICITY, UNSPECIFIED HEART FAILURE TYPE: ICD-10-CM

## 2025-06-18 DIAGNOSIS — I48.0 PAROXYSMAL ATRIAL FIBRILLATION (MULTI): ICD-10-CM

## 2025-06-18 DIAGNOSIS — N18.31 CHRONIC KIDNEY DISEASE, STAGE 3A (MULTI): ICD-10-CM

## 2025-06-18 NOTE — PROGRESS NOTES
Care Management Monthly Outreach  Chart review completed  Confirmation of at least 2 patient identifiers  Change in insurance? No    Has patient been to ER/Urgent Care since last outreach? No    Last Office Visit with PCP: 5/29/2025   Next Office Visit with PCP: 8/14/2025   APC Collaboration: n/a    Chronic Conditions and Outreach Summary:   Paroxysmal atrial fibrillation (Multi)    Chronic kidney disease, stage 3a (Multi)    Heart failure, unspecified HF chronicity, unspecified heart failure type    Patient states she is doing well right now. She saw her cardiologist recently. He decreased her bystolic and added amiodarone since she has been having more episodes of afib. He had her on 200mg for 7 days and now she is down to 100mg of amiodarone daily. She said she has not had any issues with that medication but is still feeling tired. She follows up with the cardiologist in August.     She also is taking Magnesium on Monday, Wednesday, and Friday to help raise her magnesium levels. She was taking it every day but it was giving her diarrhea so Dr. Coelho said she could take it every other day.    I let patient know I was leaving the office and someone else would be taking my place. I let her know one of my coworkers would be calling her in my absence and she was glad to have someone still call her.    Medications:   Are there medication changes since last visit? Yes- Amiodarone and bystolic  Refills needed? No    Social Drivers of Health: Addressed in the last 6 months  Care Gaps Addressed? Yes  Care Plan addressed: Yes    Upcoming Appointments:   Future Appointments       Date / Time Provider Department Dept Phone    8/14/2025 11:30 AM (Arrive by 11:15 AM) Noa Coelho MD  [facility] [phone]    8/26/2025 11:40 AM Bethel Spencer DPM [facility] [phone]    8/28/2025 1:30 PM GEORGES Arreola-CNP [facility] [phone]    11/17/2025 2:15  PM James C Ramicone, DO [facility] [phone]            Blood Pressures Reviewed  BP Readings from Last 3 Encounters:   06/06/25 128/64   05/29/25 120/67   05/14/25 113/65       Labs Reviewed:  Lab Results   Component Value Date    CREATININE 1.01 (H) 02/06/2025    GLUCOSE 101 (H) 02/06/2025    ALKPHOS 75 02/06/2025    K 3.9 02/06/2025    PROT 7.5 02/06/2025     (L) 02/06/2025    CALCIUM 10.1 02/06/2025    AST 18 02/06/2025    ALT 10 02/06/2025    BUN 27 (H) 02/06/2025    MG 2.1 02/06/2025    PHOS 3.5 04/16/2021    GFRF 62 06/01/2023     Lab Results   Component Value Date    TRIG 59 05/16/2024    CHOL 123 05/16/2024    LDLCALC 59 05/16/2024    HDL 52.4 05/16/2024     Lab Results   Component Value Date    HGBA1C 4.8 10/05/2020     Lab Results   Component Value Date    WBC 7.3 05/16/2024    RBC 3.98 (L) 05/16/2024    HGB 12.0 05/16/2024     05/16/2024       No other concerns at this time.  Agreeable to continue monthly outreaches.  Encouraged to call if questions or concerns arise.    [place] BASSN, RN, [facility]  [phone]

## 2025-07-09 ENCOUNTER — PATIENT OUTREACH (OUTPATIENT)
Dept: PRIMARY CARE | Facility: CLINIC | Age: OVER 89
End: 2025-07-09
Payer: MEDICARE

## 2025-07-09 NOTE — PROGRESS NOTES
Discharge Facility: [facility]  Discharge Diagnosis: Abdominal pain, shortness of breath  Admission Date:07/06/25  Discharge Date: 07/08/25    PCP Appointment Date: 07/15/25  Specialist Appointment Date: 07/23/25  Hospital Encounter and Summary Linked: No  See discharge assessment below for further details     Engagement  Call Start Time: 1527 (7/9/2025  3:38 PM)    Medications  Medications reviewed with patient/caregiver?: Yes (Patient has questions about why they changed her medication when she has been seeing her doctors for years. She is wondering if all the changes are necessary. Will discuss with PCP) (7/9/2025  3:38 PM)  Is the patient having any side effects they believe may be caused by any medication additions or changes?: No (7/9/2025  3:38 PM)  Does the patient have all medications ordered at discharge?: Yes (7/9/2025  3:38 PM)  Care Management Interventions: Provided patient education (7/9/2025  3:38 PM)  Is the patient taking all medications as directed (includes completed medication regime)?: Yes (7/9/2025  3:38 PM)  Care Management Interventions: Provided patient education (7/9/2025  3:38 PM)    Appointments  Does the patient have a primary care provider?: Yes (7/9/2025  3:38 PM)  Care Management Interventions: Verified appointment date/time/provider (7/9/2025  3:38 PM)  Has the patient kept scheduled appointments due by today?: Yes (7/9/2025  3:38 PM)  Care Management Interventions: Advised patient to keep appointment (7/9/2025  3:38 PM)    Self Management  What is the home health agency?: [facility] (7/9/2025  3:38 PM)  Has home health visited the patient within 72 hours of discharge?: Yes (7/9/2025  3:38 PM)    Patient Teaching  Does the patient have access to their discharge instructions?: Yes (7/9/2025  3:38 PM)  Care Management Interventions: Reviewed instructions with patient (7/9/2025  3:38 PM)  What is the patient's perception of their health status since discharge?: Improving  (7/9/2025  3:38 PM)  Is the patient/caregiver able to teach back the hierarchy of who to call/visit for symptoms/problems? PCP, Specialist, Home Health nurse, Urgent Care, ED, 911: Yes (7/9/2025  3:38 PM)

## 2025-07-15 ENCOUNTER — APPOINTMENT (OUTPATIENT)
Dept: PRIMARY CARE | Facility: CLINIC | Age: OVER 89
End: 2025-07-15
Payer: MEDICARE

## 2025-07-15 VITALS
WEIGHT: 119.8 LBS | OXYGEN SATURATION: 97 % | HEART RATE: 67 BPM | DIASTOLIC BLOOD PRESSURE: 62 MMHG | SYSTOLIC BLOOD PRESSURE: 152 MMHG | HEIGHT: 62 IN | BODY MASS INDEX: 22.05 KG/M2

## 2025-07-15 DIAGNOSIS — I10 PRIMARY HYPERTENSION: ICD-10-CM

## 2025-07-15 DIAGNOSIS — I50.9 CHRONIC HEART FAILURE, UNSPECIFIED HEART FAILURE TYPE: ICD-10-CM

## 2025-07-15 DIAGNOSIS — Z09 HOSPITAL DISCHARGE FOLLOW-UP: Primary | ICD-10-CM

## 2025-07-15 DIAGNOSIS — E87.1 HYPONATREMIA: ICD-10-CM

## 2025-07-15 DIAGNOSIS — I51.89 LEFT VENTRICULAR DIASTOLIC DYSFUNCTION, NYHA CLASS 1: ICD-10-CM

## 2025-07-15 DIAGNOSIS — I10 ESSENTIAL HYPERTENSION: ICD-10-CM

## 2025-07-15 DIAGNOSIS — K59.09 CONSTIPATION, CHRONIC: ICD-10-CM

## 2025-07-15 DIAGNOSIS — I48.0 PAROXYSMAL ATRIAL FIBRILLATION (MULTI): Chronic | ICD-10-CM

## 2025-07-15 PROCEDURE — 1159F MED LIST DOCD IN RCRD: CPT | Performed by: INTERNAL MEDICINE

## 2025-07-15 PROCEDURE — 3077F SYST BP >= 140 MM HG: CPT | Performed by: INTERNAL MEDICINE

## 2025-07-15 PROCEDURE — 99496 TRANSJ CARE MGMT HIGH F2F 7D: CPT | Performed by: INTERNAL MEDICINE

## 2025-07-15 PROCEDURE — 3078F DIAST BP <80 MM HG: CPT | Performed by: INTERNAL MEDICINE

## 2025-07-15 PROCEDURE — 1126F AMNT PAIN NOTED NONE PRSNT: CPT | Performed by: INTERNAL MEDICINE

## 2025-07-15 PROCEDURE — 1160F RVW MEDS BY RX/DR IN RCRD: CPT | Performed by: INTERNAL MEDICINE

## 2025-07-15 PROCEDURE — 1036F TOBACCO NON-USER: CPT | Performed by: INTERNAL MEDICINE

## 2025-07-15 RX ORDER — BENAZEPRIL HYDROCHLORIDE 40 MG/1
20 TABLET ORAL 2 TIMES DAILY
Qty: 180 TABLET | Refills: 3 | Status: SHIPPED | OUTPATIENT
Start: 2025-07-15

## 2025-07-15 RX ORDER — POLYETHYLENE GLYCOL 3350 17 G/17G
17 POWDER, FOR SOLUTION ORAL DAILY
COMMUNITY

## 2025-07-15 RX ORDER — FUROSEMIDE 20 MG/1
20 TABLET ORAL DAILY
Qty: 45 TABLET | Refills: 3 | Status: SHIPPED | OUTPATIENT
Start: 2025-07-15

## 2025-07-15 ASSESSMENT — ENCOUNTER SYMPTOMS
DIZZINESS: 0
FREQUENCY: 0
TROUBLE SWALLOWING: 0
FEVER: 0
CONSTIPATION: 0
SORE THROAT: 0
SHORTNESS OF BREATH: 0
ARTHRALGIAS: 0
PALPITATIONS: 0
FATIGUE: 1
DIARRHEA: 1
LIGHT-HEADEDNESS: 0
ABDOMINAL PAIN: 0
VOMITING: 0
COUGH: 0
NAUSEA: 0
DYSURIA: 0

## 2025-07-15 ASSESSMENT — PAIN SCALES - GENERAL: PAINLEVEL_OUTOF10: 0-NO PAIN

## 2025-07-15 NOTE — PATIENT INSTRUCTIONS
"Weigh yourself every morning.  If you gain 3 lbs or more in 3 days then please call Dr Ramicone or Dr Coelho as you may be going into heart failure  If you feel short of breath or have chest pains please call 911 or have someone take you to the ER    Try getting an \"egg crate\" or soft top to your matress    Keep appointment in August with Dr Coelho  "

## 2025-07-15 NOTE — PROGRESS NOTES
Subjective   Patient ID: Gris Salcido is a 89 y.o. female who presents for hospital discharge and general health management.    Patient is here today for hospital follow-up for congestive heart failure.  She was admitted July 6 and discharged July 8 for abdominal pain which ended up being heart failure.  She had multiple electrolytes abnormal that have not been corrected.  Her amlodipine atorvastatin meclizine and Metamucil were all discontinued.  Amiodarone 100 mg daily and MiraLAX were added to her regiment.  She is complaining of soft stools at this time so I have told her to cut back on the MiraLAX and take only half a dose        Review of Systems   Constitutional:  Positive for fatigue. Negative for fever.   HENT:  Negative for sore throat and trouble swallowing.    Eyes:  Negative for visual disturbance.   Respiratory:  Negative for cough and shortness of breath.    Cardiovascular:  Negative for chest pain, palpitations and leg swelling.   Gastrointestinal:  Positive for diarrhea. Negative for abdominal pain, constipation, nausea and vomiting.   Genitourinary:  Negative for dysuria and frequency.   Musculoskeletal:  Negative for arthralgias.   Skin:  Negative for rash.   Neurological:  Negative for dizziness and light-headedness.       Objective   Medication Documentation Review Audit       Reviewed by Noa Coelho MD (Physician) on 07/15/25 at 1530      Medication Order Taking? Sig Documenting Provider Last Dose Status   acetaminophen (TylenoL) 325 mg tablet [id number]  Tylenol 325 MG Oral Tablet   Refills: 0       Active Historical Provider, MD  Active   ALPRAZolam (Xanax) 0.25 mg tablet [id number]  Take 1 tablet (0.25 mg) by mouth once daily as needed for anxiety (anxiety). Noa Coelho MD  Active   amiodarone (Pacerone) 200 mg tablet [id number]  Take 1 tablet (200 mg) by mouth once daily. James C Ramicone, DO  Active   amLODIPine (Norvasc) 5 mg tablet [id number]  Take 1 tablet (5 mg) by mouth 2 times a  day. Noa Coelho MD  Active   amoxicillin (Amoxil) 500 mg capsule [id number]  TAKE FOUR CAPSULES BY MOUTH ONE HOUR BEFORE APPOINTMENT Historical Provider, MD  Active   apixaban (Eliquis) 2.5 mg tablet [id number]  Take 1 tablet (2.5 mg) by mouth 2 times a day. Noa Coelho MD  Active   atorvastatin (Lipitor) 10 mg tablet [id number]  TAKE ONE-HALF TABLET BY MOUTH  ONCE DAILY Noa Coelho MD  Active   bacitracin-polymyxin B (Polysporin) ophthalmic ointment [id number]  1 Application 4 times a day. Historical Provider, MD  Active   benazepril (Lotensin) 40 mg tablet [id number]  Take 1 tablet (40 mg) by mouth 2 times a day. Noa Coelho MD  Active   calcium carbonate 600 mg calcium (1,500 mg) tablet [id number]  Take 1 tablet (1,500 mg) by mouth 3 times a day. Historical Provider, MD  Active   cholecalciferol (Vitamin D-3) 25 MCG (1000 UT) capsule [id number]  TAKE 1 CAPSULE M, W,Fri, Sat Historical Provider, MD  Active   clobetasol (Temovate) 0.05 % cream [id number]  Apply cream topically to affected areas on the trunk & extremities twice daily until clear, then as needed for flares cover with thick moisturizer, avoid groin, face Historical Provider, MD  Active   estradiol (Estrace) 0.01 % (0.1 mg/gram) vaginal cream [id number]   Historical Provider, MD  Active   famotidine (Pepcid) 20 mg tablet [id number]  Take 1 tablet (20 mg) by mouth 2 times a day. Historical Provider, MD  Active   fluticasone (Flonase) 50 mcg/actuation nasal spray [id number]  USE 1 SPRAY IN BOTH NOSTRILS  ONCE DAILY Noa Coelho MD  Active   furosemide (Lasix) 20 mg tablet [id number]  TAKE ONE-HALF TABLET BY MOUTH  ONCE DAILY Noa Coelho MD  Active   meclizine (Antivert) 25 mg tablet,chewable [id number]  CHEW AND SWALLOW 1 TABLET THREE TIMES DAILY AS NEEDED Historical Provider, MD  Active   mometasone (Elocon) 0.1 % cream [id number]  APPLY CREAM TOPICALLY TWICE DAILY TO AFFECTED AREA ON LEG UNTIL RESOLVED, TAPER AS DIRECTED Historical Provider, MD   "Active   nebivolol (Bystolic) 5 mg tablet [id number]  Take 1 tablet (5 mg) by mouth once daily. James C Ramicone,   Active   neomycin-polymyxin-dexAMETHasone (Maxitrol) 3.5mg/mL-10,000 unit/mL-0.1 % ophthalmic suspension [id number]   Historical Provider, MD  Active   nitroglycerin (Nitrostat) 0.4 mg SL tablet [id number]  Place 1 tablet (0.4 mg) under the tongue every 5 minutes if needed for chest pain. For up to 3 doses. Call 911 if pain persists. Historical Provider, MD  Active   oxyquinoline-sod.lauryl sulfat (Trimo-Stovall Jelly) 0.025-0.01 % gel [id number]  Insert into the vagina. Historical Provider, MD  Active   potassium chloride CR 20 mEq ER tablet [id number]  Take 1 tablet (20 mEq) by mouth once a day on Monday, Wednesday, and Friday. Noa Coelho MD  Active   Premarin vaginal cream [id number]  USE 1G VAGINALLY ONCE DAILY AT BEDTIME AS DIRECTED Historical Provider, MD  Active   sertraline (Zoloft) 25 mg tablet [id number]  Take 1 tablet (25 mg) by mouth once daily. Noa Coelho MD  Active                  Allergies[1]    /62   Pulse 67   Ht 1.562 m (5' 1.5\")   Wt 54.3 kg (119 lb 12.8 oz)   SpO2 97%   BMI 22.27 kg/m²     Physical Exam  Constitutional:       Appearance: Normal appearance.   HENT:      Head: Normocephalic and atraumatic.      Nose: Nose normal.   Eyes:      Extraocular Movements: Extraocular movements intact.      Pupils: Pupils are equal, round, and reactive to light.   Cardiovascular:      Rate and Rhythm: Normal rate and regular rhythm.      Heart sounds: Murmur heard.   Pulmonary:      Breath sounds: Normal breath sounds.   Abdominal:      General: Abdomen is flat. Bowel sounds are normal.      Palpations: Abdomen is soft.   Musculoskeletal:      Right lower leg: No edema.      Left lower leg: No edema.   Neurological:      Mental Status: She is alert.           Assessment/Plan   Problem List Items Addressed This Visit       Left ventricular diastolic dysfunction, NYHA class 1    " Hypertension    Relevant Medications    benazepril (Lotensin) 40 mg tablet    Constipation, chronic    Patient's Metamucil was discontinued in favor of MiraLAX but now she is having soft stools and some incontinence issues.  I have recommended that she take half a dose of MiraLAX daily to see if that is enough to prevent the constipation and still not cause diarrhea         Hospital discharge follow-up - Primary    Patient is here for hospital follow-up discharge.  She was admitted on July 6 and discharged July 8 for abdominal pain and shortness of breath.  Patient had some electrolytes including sodium that were abnormal at 129 as well and she was determined to be in congestive heart failure.  Several of her medications were altered and her medications were reconciled and updated in our list today as well.  She is now feeling much better but has questions about congestive heart failure    I have answered her questions to the best my ability and encouraged her to check her weight every morning.  If she gains more than 3 pounds in 3 days she is to either call myself or her cardiologist Dr Ramicone.         Paroxysmal atrial fibrillation (Multi) (Chronic)    Chronic heart failure, unspecified heart failure type    Patient was found to have significant of heart failure in the hospital.  She is now on amiodarone and her amlodipine and atorvastatin were discontinued.  She does have an upcoming appointment with Dr Ramicone as well.  She was given specific instructions that if she gains 3 pounds in 3 days she is to call us and we can help correct her Lasix or add a different diuretic if necessary         Relevant Medications    benazepril (Lotensin) 40 mg tablet    furosemide (Lasix) 20 mg tablet    Hyponatremia    Sodium 129 while in the hospital however it was normalized by the time of discharge          Other Visit Diagnoses         Essential hypertension        Relevant Medications    furosemide (Lasix) 20 mg tablet                    It has been a pleasure seeing you.  Noa Coelho MD         [1]   Allergies  Allergen Reactions    Erythromycin Diarrhea and Shortness of breath    Amlodipine Other     Ankle edema in higher doses    Cephalexin Unknown and Other     sore mouth    Hydralazine Unknown     Chest pain    Hydrochlorothiazide Unknown    Lisinopril-Hydrochlorothiazide Other    Nifedipine Unknown    Streptomycin Unknown    Tetracyclines Unknown    Valsartan Unknown

## 2025-07-15 NOTE — ASSESSMENT & PLAN NOTE
Patient is here for hospital follow-up discharge.  She was admitted on July 6 and discharged July 8 for abdominal pain and shortness of breath.  Patient had some electrolytes including sodium that were abnormal at 129 as well and she was determined to be in congestive heart failure.  Several of her medications were altered and her medications were reconciled and updated in our list today as well.  She is now feeling much better but has questions about congestive heart failure    I have answered her questions to the best my ability and encouraged her to check her weight every morning.  If she gains more than 3 pounds in 3 days she is to either call myself or her cardiologist Dr Ramicone.

## 2025-07-15 NOTE — ASSESSMENT & PLAN NOTE
Patient was found to have significant of heart failure in the hospital.  She is now on amiodarone and her amlodipine and atorvastatin were discontinued.  She does have an upcoming appointment with Dr Ramicone as well.  She was given specific instructions that if she gains 3 pounds in 3 days she is to call us and we can help correct her Lasix or add a different diuretic if necessary

## 2025-07-15 NOTE — ASSESSMENT & PLAN NOTE
Patient's Metamucil was discontinued in favor of MiraLAX but now she is having soft stools and some incontinence issues.  I have recommended that she take half a dose of MiraLAX daily to see if that is enough to prevent the constipation and still not cause diarrhea

## 2025-07-17 ENCOUNTER — TELEPHONE (OUTPATIENT)
Dept: PRIMARY CARE | Facility: CLINIC | Age: OVER 89
End: 2025-07-17
Payer: MEDICARE

## 2025-07-17 NOTE — TELEPHONE ENCOUNTER
Spoke with Zaira Fitch and clarified Dr Coelho's message/recommendations regarding her medication.

## 2025-07-17 NOTE — TELEPHONE ENCOUNTER
Zaira Fitch stated:    She has a medication question regarding the Benazepril    According to the hospital discharge paperwork she is supposed to be taking half a 40 mg tablet once a day    But last AVS from Dr Coelho's office states 40 mg- 1 tablet twice a day    She was calling to confirm which dosage she should be taking because Gris is confused as well

## 2025-07-18 ENCOUNTER — TELEPHONE (OUTPATIENT)
Dept: PRIMARY CARE | Facility: CLINIC | Age: OVER 89
End: 2025-07-18
Payer: MEDICARE

## 2025-07-18 NOTE — TELEPHONE ENCOUNTER
Pt called asking if she should cut back on her Benazepril? She has been taking 40 mg in the am and 40 mg in the pm. She stated someone told her to take 20 in the morning. (Didn't say who) She also wants to know if she should stop the Amlodipine all together. Please advise.

## 2025-07-21 ENCOUNTER — PATIENT OUTREACH (OUTPATIENT)
Dept: PRIMARY CARE | Facility: CLINIC | Age: OVER 89
End: 2025-07-21
Payer: MEDICARE

## 2025-07-22 ENCOUNTER — PATIENT OUTREACH (OUTPATIENT)
Dept: PRIMARY CARE | Facility: CLINIC | Age: OVER 89
End: 2025-07-22
Payer: MEDICARE

## 2025-07-22 DIAGNOSIS — E78.5 HYPERLIPIDEMIA, UNSPECIFIED HYPERLIPIDEMIA TYPE: ICD-10-CM

## 2025-07-22 DIAGNOSIS — I48.0 PAROXYSMAL ATRIAL FIBRILLATION (MULTI): Chronic | ICD-10-CM

## 2025-07-22 DIAGNOSIS — I10 PRIMARY HYPERTENSION: ICD-10-CM

## 2025-07-22 NOTE — PROGRESS NOTES
Monthly outreach to patient.   1.Confirmed she stopped amlodipine  2.Confirmed taking benazepril 20mg 2 times a day. She takes 0.5 of a 40mg tablet 2 times a day.   3. After reviewing complete medication list reminded her that her Nitrostat should be replaced every six months after opening bottle.   4. She has appt. tomorrow with Dr. Ramicone and will take her daily weights with her. She has not gained more than a pound in any 3 day period.   5. Educated her on the purpose of an egg crate mattress topper for better promotion of sleep, alleviate pressure on joints and bony prominences, and reduce risk of bed sores.   6. She and her family are looking into assisted living options    Care Management Monthly Outreach  Chart review completed  Confirmation of at least 2 patient identifiers  Change in insurance? No    Has patient been to ER/Urgent Care since last outreach? No    Last Office Visit with PCP: 7/15/2025   Next Office Visit with PCP: 8/14/2025   APC Collaboration: n/a    Chronic Conditions and Outreach Summary:   Primary hypertension    Hyperlipidemia, unspecified hyperlipidemia type    Paroxysmal atrial fibrillation (Multi)      Medications:   Are there medication changes since last visit? Yes - Discontinued amlodipine  Refills needed? No    Social Drivers of Health: Addressed in the last 6 months  Care Gaps Addressed? Addressed in the last 6 months  Care Plan addressed: Yes    Upcoming Appointments:   Future Appointments       Date / Time Provider Department Dept Phone    7/23/2025 11:45 AM James C Ramicone, DO [facility] 3 [phone]    8/14/2025 11:30 AM (Arrive by 11:15 AM) Noa Coelho MD  [facility] [phone]    8/26/2025 11:40 AM Bethel Spencer DPM [facility] [phone]    8/28/2025 1:30 PM GEORGES Arreola-CNP [facility] [phone]    11/17/2025 2:15 PM James C Ramicone, DO [facility]  [place] [phone]          Blood Pressures Reviewed  BP Readings from Last 3 Encounters:   07/15/25 152/62   06/06/25 128/64   05/29/25 120/67     Labs Reviewed:  Lab Results   Component Value Date    CREATININE 1.01 (H) 02/06/2025    GLUCOSE 101 (H) 02/06/2025    ALKPHOS 75 02/06/2025    K 3.9 02/06/2025    PROT 7.5 02/06/2025     (L) 02/06/2025    CALCIUM 10.1 02/06/2025    AST 18 02/06/2025    ALT 10 02/06/2025    BUN 27 (H) 02/06/2025    MG 2.1 02/06/2025    PHOS 3.5 04/16/2021    GFRF 62 06/01/2023     Lab Results   Component Value Date    TRIG 59 05/16/2024    CHOL 123 05/16/2024    LDLCALC 59 05/16/2024    HDL 52.4 05/16/2024     Lab Results   Component Value Date    HGBA1C 4.8 10/05/2020     Lab Results   Component Value Date    WBC 7.3 05/16/2024    RBC 3.98 (L) 05/16/2024    HGB 12.0 05/16/2024     05/16/2024   No other concerns at this time.  Agreeable to continue monthly outreaches.  Encouraged to call if questions or concerns arise.    TEVIN SAINI

## 2025-07-23 ENCOUNTER — APPOINTMENT (OUTPATIENT)
Dept: CARDIOLOGY | Facility: CLINIC | Age: OVER 89
End: 2025-07-23
Payer: MEDICARE

## 2025-07-25 ENCOUNTER — APPOINTMENT (OUTPATIENT)
Dept: CARDIOLOGY | Facility: CLINIC | Age: OVER 89
End: 2025-07-25
Payer: MEDICARE

## 2025-08-05 ENCOUNTER — PATIENT OUTREACH (OUTPATIENT)
Dept: PRIMARY CARE | Facility: CLINIC | Age: OVER 89
End: 2025-08-05
Payer: MEDICARE

## 2025-08-07 ENCOUNTER — TELEPHONE (OUTPATIENT)
Dept: PRIMARY CARE | Facility: CLINIC | Age: OVER 89
End: 2025-08-07
Payer: MEDICARE

## 2025-08-07 NOTE — TELEPHONE ENCOUNTER
Pt calling in asking you to send in protonix. Dr Ramicone said that this may be better for her to take with the Amiodarone?? She wants to know your opinion? [facility] is where she would like it sent.

## 2025-08-14 ENCOUNTER — APPOINTMENT (OUTPATIENT)
Dept: PRIMARY CARE | Facility: CLINIC | Age: OVER 89
End: 2025-08-14
Payer: MEDICARE

## 2025-08-14 VITALS
DIASTOLIC BLOOD PRESSURE: 61 MMHG | HEIGHT: 62 IN | SYSTOLIC BLOOD PRESSURE: 169 MMHG | OXYGEN SATURATION: 97 % | WEIGHT: 117.2 LBS | BODY MASS INDEX: 21.57 KG/M2 | HEART RATE: 66 BPM

## 2025-08-14 DIAGNOSIS — N39.46 MIXED INCONTINENCE: ICD-10-CM

## 2025-08-14 DIAGNOSIS — I10 PRIMARY HYPERTENSION: ICD-10-CM

## 2025-08-14 DIAGNOSIS — I51.89 GRADE I DIASTOLIC DYSFUNCTION: Primary | ICD-10-CM

## 2025-08-14 DIAGNOSIS — I47.19 ATRIAL TACHYCARDIA: ICD-10-CM

## 2025-08-14 DIAGNOSIS — I50.9 CHRONIC HEART FAILURE, UNSPECIFIED HEART FAILURE TYPE: ICD-10-CM

## 2025-08-14 PROCEDURE — 3078F DIAST BP <80 MM HG: CPT | Performed by: INTERNAL MEDICINE

## 2025-08-14 PROCEDURE — 1159F MED LIST DOCD IN RCRD: CPT | Performed by: INTERNAL MEDICINE

## 2025-08-14 PROCEDURE — 99214 OFFICE O/P EST MOD 30 MIN: CPT | Performed by: INTERNAL MEDICINE

## 2025-08-14 PROCEDURE — 3077F SYST BP >= 140 MM HG: CPT | Performed by: INTERNAL MEDICINE

## 2025-08-14 PROCEDURE — G2211 COMPLEX E/M VISIT ADD ON: HCPCS | Performed by: INTERNAL MEDICINE

## 2025-08-14 PROCEDURE — 1160F RVW MEDS BY RX/DR IN RCRD: CPT | Performed by: INTERNAL MEDICINE

## 2025-08-14 PROCEDURE — 1126F AMNT PAIN NOTED NONE PRSNT: CPT | Performed by: INTERNAL MEDICINE

## 2025-08-14 RX ORDER — NEBIVOLOL 5 MG/1
10 TABLET ORAL DAILY
Qty: 180 TABLET | Refills: 3 | Status: SHIPPED | OUTPATIENT
Start: 2025-08-14 | End: 2026-08-14

## 2025-08-14 RX ORDER — BENAZEPRIL HYDROCHLORIDE 40 MG/1
40 TABLET ORAL 2 TIMES DAILY
Qty: 180 TABLET | Refills: 3 | Status: SHIPPED | OUTPATIENT
Start: 2025-08-14

## 2025-08-14 RX ORDER — AMLODIPINE BESYLATE 5 MG/1
5 TABLET ORAL DAILY
Qty: 90 TABLET | Refills: 3 | Status: SHIPPED | OUTPATIENT
Start: 2025-08-14 | End: 2026-02-10

## 2025-08-14 ASSESSMENT — ENCOUNTER SYMPTOMS
COUGH: 0
ARTHRALGIAS: 0
DYSURIA: 0
DIZZINESS: 0
ABDOMINAL PAIN: 0
FEVER: 0
DIARRHEA: 0
SHORTNESS OF BREATH: 0
VOMITING: 0
FATIGUE: 0
SORE THROAT: 0
CONSTIPATION: 0
PALPITATIONS: 0
FREQUENCY: 0
TROUBLE SWALLOWING: 0
NAUSEA: 0
LIGHT-HEADEDNESS: 0

## 2025-08-14 ASSESSMENT — PATIENT HEALTH QUESTIONNAIRE - PHQ9: 1. LITTLE INTEREST OR PLEASURE IN DOING THINGS: NOT AT ALL

## 2025-08-14 ASSESSMENT — PAIN SCALES - GENERAL: PAINLEVEL_OUTOF10: 0-NO PAIN

## 2025-08-15 ENCOUNTER — TELEPHONE (OUTPATIENT)
Dept: PRIMARY CARE | Facility: CLINIC | Age: OVER 89
End: 2025-08-15
Payer: MEDICARE

## 2025-08-19 ENCOUNTER — TELEPHONE (OUTPATIENT)
Dept: PRIMARY CARE | Facility: CLINIC | Age: OVER 89
End: 2025-08-19
Payer: MEDICARE

## 2025-08-22 ENCOUNTER — PATIENT OUTREACH (OUTPATIENT)
Dept: PRIMARY CARE | Facility: CLINIC | Age: OVER 89
End: 2025-08-22
Payer: MEDICARE

## 2025-08-22 DIAGNOSIS — E78.5 HYPERLIPIDEMIA, UNSPECIFIED HYPERLIPIDEMIA TYPE: ICD-10-CM

## 2025-08-22 DIAGNOSIS — I10 PRIMARY HYPERTENSION: ICD-10-CM

## 2025-08-25 ENCOUNTER — PATIENT OUTREACH (OUTPATIENT)
Dept: PRIMARY CARE | Facility: CLINIC | Age: OVER 89
End: 2025-08-25
Payer: MEDICARE

## 2025-08-25 DIAGNOSIS — I10 PRIMARY HYPERTENSION: ICD-10-CM

## 2025-08-25 DIAGNOSIS — E78.5 HYPERLIPIDEMIA, UNSPECIFIED HYPERLIPIDEMIA TYPE: ICD-10-CM

## 2025-08-26 ENCOUNTER — APPOINTMENT (OUTPATIENT)
Dept: PODIATRY | Facility: CLINIC | Age: OVER 89
End: 2025-08-26
Payer: MEDICARE

## 2025-08-26 DIAGNOSIS — M79.671 RIGHT FOOT PAIN: ICD-10-CM

## 2025-08-26 DIAGNOSIS — M79.675 TOE PAIN, LEFT: ICD-10-CM

## 2025-08-26 DIAGNOSIS — M79.672 LEFT FOOT PAIN: ICD-10-CM

## 2025-08-26 DIAGNOSIS — B35.1 TINEA UNGUIUM: ICD-10-CM

## 2025-08-26 DIAGNOSIS — L84 CORNS AND CALLOSITIES: Primary | ICD-10-CM

## 2025-08-26 DIAGNOSIS — M79.674 TOE PAIN, RIGHT: ICD-10-CM

## 2025-08-28 ENCOUNTER — OFFICE VISIT (OUTPATIENT)
Dept: CARDIOLOGY | Facility: CLINIC | Age: OVER 89
End: 2025-08-28
Payer: MEDICARE

## 2025-08-28 ASSESSMENT — PAIN SCALES - GENERAL: PAINLEVEL_OUTOF10: 0-NO PAIN

## 2025-08-28 ASSESSMENT — ENCOUNTER SYMPTOMS
DEPRESSION: 0
LOSS OF SENSATION IN FEET: 0
OCCASIONAL FEELINGS OF UNSTEADINESS: 0

## 2025-08-28 ASSESSMENT — COLUMBIA-SUICIDE SEVERITY RATING SCALE - C-SSRS
6. HAVE YOU EVER DONE ANYTHING, STARTED TO DO ANYTHING, OR PREPARED TO DO ANYTHING TO END YOUR LIFE?: NO
1. IN THE PAST MONTH, HAVE YOU WISHED YOU WERE DEAD OR WISHED YOU COULD GO TO SLEEP AND NOT WAKE UP?: NO
2. HAVE YOU ACTUALLY HAD ANY THOUGHTS OF KILLING YOURSELF?: NO

## 2025-08-29 ENCOUNTER — TELEPHONE (OUTPATIENT)
Dept: CARDIOLOGY | Facility: CLINIC | Age: OVER 89
End: 2025-08-29
Payer: MEDICARE

## 2025-09-15 ENCOUNTER — APPOINTMENT (OUTPATIENT)
Dept: PRIMARY CARE | Facility: CLINIC | Age: OVER 89
End: 2025-09-15
Payer: MEDICARE